# Patient Record
Sex: MALE | Race: WHITE | NOT HISPANIC OR LATINO | Employment: OTHER | URBAN - METROPOLITAN AREA
[De-identification: names, ages, dates, MRNs, and addresses within clinical notes are randomized per-mention and may not be internally consistent; named-entity substitution may affect disease eponyms.]

---

## 2017-04-13 ENCOUNTER — HOSPITAL ENCOUNTER (OUTPATIENT)
Dept: ORTHOPEDIC SURGERY | Facility: CLINIC | Age: 77
Discharge: HOME | End: 2017-04-13
Attending: PODIATRIST | Admitting: PODIATRIST

## 2017-05-18 ENCOUNTER — HOSPITAL ENCOUNTER (OUTPATIENT)
Dept: MRI IMAGING | Facility: HOSPITAL | Age: 77
Discharge: HOME | End: 2017-05-18
Attending: PODIATRIST | Admitting: PODIATRIST

## 2017-11-15 ENCOUNTER — HOSPITAL ENCOUNTER (OUTPATIENT)
Dept: ULTRASOUND IMAGING | Facility: HOSPITAL | Age: 77
Discharge: HOME | End: 2017-11-15
Attending: FAMILY MEDICINE | Admitting: FAMILY MEDICINE

## 2017-11-24 ENCOUNTER — HOSPITAL ENCOUNTER (OUTPATIENT)
Dept: CT IMAGING | Facility: HOSPITAL | Age: 77
Discharge: HOME | End: 2017-11-24
Attending: UROLOGY | Admitting: UROLOGY

## 2017-11-24 LAB — CREAT BLDA-MCNC: 0.8 MG/DL (ref 0.6–1.3)

## 2018-10-19 ENCOUNTER — HOSPITAL ENCOUNTER (OUTPATIENT)
Dept: MRI IMAGING | Facility: HOSPITAL | Age: 78
Discharge: HOME | End: 2018-10-19
Attending: NEUROLOGICAL SURGERY | Admitting: NEUROLOGICAL SURGERY

## 2019-07-19 ENCOUNTER — TELEPHONE (OUTPATIENT)
Dept: FAMILY MEDICINE CLINIC | Facility: CLINIC | Age: 79
End: 2019-07-19

## 2019-07-19 DIAGNOSIS — I10 ESSENTIAL HYPERTENSION: Primary | ICD-10-CM

## 2019-07-19 NOTE — TELEPHONE ENCOUNTER
Patient came into office wanting to have labs drawn but no orders are in chart. Will have labs drawn prior to seeing you. Please advise.

## 2019-07-19 NOTE — TELEPHONE ENCOUNTER
Patient's spouse notified that he has labs ordered in his chart for fasting and does not need an appt for lab work. She was advised that he will need an appt with Dr. Guzmán to go over those or for any med refills. She voiced understanding.

## 2019-07-23 LAB
ALBUMIN SERPL-MCNC: 4.3 G/DL (ref 3.5–4.8)
ALBUMIN/GLOB SERPL: 1.7 {RATIO} (ref 1.2–2.2)
ALP SERPL-CCNC: 83 IU/L (ref 39–117)
ALT SERPL-CCNC: 11 IU/L (ref 0–44)
AMBIG ABBREV CMP14 DEFAULT: NORMAL
AMBIG ABBREV LP DEFAULT: NORMAL
AST SERPL-CCNC: 15 IU/L (ref 0–40)
BILIRUB SERPL-MCNC: 0.7 MG/DL (ref 0–1.2)
BUN SERPL-MCNC: 12 MG/DL (ref 8–27)
BUN/CREAT SERPL: 16 (ref 10–24)
CALCIUM SERPL-MCNC: 9.6 MG/DL (ref 8.6–10.2)
CHLORIDE SERPL-SCNC: 104 MMOL/L (ref 96–106)
CHOLEST SERPL-MCNC: 144 MG/DL (ref 100–199)
CO2 SERPL-SCNC: 22 MMOL/L (ref 20–29)
CREAT SERPL-MCNC: 0.76 MG/DL (ref 0.76–1.27)
GLOBULIN SER CALC-MCNC: 2.5 G/DL (ref 1.5–4.5)
GLUCOSE SERPL-MCNC: 98 MG/DL (ref 65–99)
HDLC SERPL-MCNC: 45 MG/DL
LDLC SERPL CALC-MCNC: 80 MG/DL (ref 0–99)
POTASSIUM SERPL-SCNC: 4.5 MMOL/L (ref 3.5–5.2)
PROT SERPL-MCNC: 6.8 G/DL (ref 6–8.5)
SODIUM SERPL-SCNC: 141 MMOL/L (ref 134–144)
TRIGL SERPL-MCNC: 94 MG/DL (ref 0–149)
VLDLC SERPL CALC-MCNC: 19 MG/DL (ref 5–40)

## 2019-07-26 ENCOUNTER — TELEPHONE (OUTPATIENT)
Dept: FAMILY MEDICINE CLINIC | Facility: CLINIC | Age: 79
End: 2019-07-26

## 2019-07-26 PROBLEM — Z00.00 ENCOUNTER FOR GENERAL ADULT MEDICAL EXAMINATION WITHOUT ABNORMAL FINDINGS: Status: ACTIVE | Noted: 2017-02-14

## 2019-07-26 PROBLEM — M76.70 PERONEAL TENDINITIS: Status: ACTIVE | Noted: 2017-04-13

## 2019-07-26 PROBLEM — Z95.9 PRESENCE OF CARDIAC AND VASCULAR IMPLANT AND GRAFT: Status: ACTIVE | Noted: 2017-11-01

## 2019-07-26 PROBLEM — M79.672 FOOT PAIN, LEFT: Status: ACTIVE | Noted: 2017-04-13

## 2019-07-26 PROBLEM — J01.90 ACUTE SINUSITIS: Status: ACTIVE | Noted: 2017-06-20

## 2019-07-26 PROBLEM — J20.9 ACUTE BRONCHITIS: Status: ACTIVE | Noted: 2018-09-05

## 2019-07-26 PROBLEM — Z23 ENCOUNTER FOR IMMUNIZATION: Status: ACTIVE | Noted: 2018-01-08

## 2019-07-26 PROBLEM — J06.9 ACUTE UPPER RESPIRATORY INFECTION: Status: ACTIVE | Noted: 2017-11-01

## 2019-07-26 PROBLEM — Z12.5 ENCOUNTER FOR SCREENING FOR MALIGNANT NEOPLASM OF PROSTATE: Status: ACTIVE | Noted: 2017-05-15

## 2019-07-26 PROBLEM — I71.40 ABDOMINAL AORTIC ANEURYSM (HCC): Status: ACTIVE | Noted: 2017-11-01

## 2019-07-26 PROBLEM — R31.9 HEMATURIA: Status: ACTIVE | Noted: 2017-11-01

## 2019-07-26 RX ORDER — FINASTERIDE 5 MG/1
5 TABLET, FILM COATED ORAL DAILY
COMMUNITY
Start: 2019-06-05

## 2019-07-26 RX ORDER — TRAMADOL HYDROCHLORIDE 50 MG/1
50 TABLET ORAL EVERY 6 HOURS PRN
Refills: 0 | COMMUNITY
Start: 2019-05-21 | End: 2019-11-13

## 2019-07-26 RX ORDER — TAMSULOSIN HYDROCHLORIDE 0.4 MG/1
1 CAPSULE ORAL DAILY
COMMUNITY
Start: 2019-04-22 | End: 2019-10-28 | Stop reason: SDUPTHER

## 2019-07-26 RX ORDER — PRAVASTATIN SODIUM 80 MG/1
TABLET ORAL
COMMUNITY
Start: 2019-05-28 | End: 2019-08-26 | Stop reason: SDUPTHER

## 2019-07-26 RX ORDER — ASPIRIN 81 MG/1
81 TABLET ORAL DAILY
COMMUNITY
Start: 2012-02-06

## 2019-07-26 NOTE — TELEPHONE ENCOUNTER
Patient came in and said he is needing a refill on his Tamsulosin 0.4 MG- please send to [facility]

## 2019-07-29 RX ORDER — TAMSULOSIN HYDROCHLORIDE 0.4 MG/1
1 CAPSULE ORAL NIGHTLY
COMMUNITY
End: 2019-07-29 | Stop reason: SDUPTHER

## 2019-07-29 RX ORDER — TAMSULOSIN HYDROCHLORIDE 0.4 MG/1
1 CAPSULE ORAL NIGHTLY
Qty: 90 CAPSULE | Refills: 0 | Status: SHIPPED | OUTPATIENT
Start: 2019-07-29 | End: 2019-07-30 | Stop reason: SDUPTHER

## 2019-07-30 ENCOUNTER — OFFICE VISIT (OUTPATIENT)
Dept: CARDIOLOGY | Facility: CLINIC | Age: 79
End: 2019-07-30

## 2019-07-30 VITALS
SYSTOLIC BLOOD PRESSURE: 103 MMHG | HEART RATE: 73 BPM | BODY MASS INDEX: 24.39 KG/M2 | OXYGEN SATURATION: 100 % | DIASTOLIC BLOOD PRESSURE: 64 MMHG | WEIGHT: 170 LBS

## 2019-07-30 DIAGNOSIS — I25.118 CORONARY ARTERY DISEASE OF NATIVE ARTERY OF NATIVE HEART WITH STABLE ANGINA PECTORIS (HCC): Primary | ICD-10-CM

## 2019-07-30 DIAGNOSIS — I10 ESSENTIAL HYPERTENSION: ICD-10-CM

## 2019-07-30 DIAGNOSIS — E78.00 PURE HYPERCHOLESTEROLEMIA: ICD-10-CM

## 2019-07-30 PROCEDURE — 99213 OFFICE O/P EST LOW 20 MIN: CPT | Performed by: INTERNAL MEDICINE

## 2019-07-30 NOTE — PROGRESS NOTES
Subjective:     Encounter Date:07/30/2019      Patient ID: Yordy Hebert is a 78 y.o. male.    Chief Complaint:  History of Present Illness 78-year-old white male with a history of coronary artery disease history of abdominal aortic aneurysm and peripheral vascular disease hypertension hyperlipidemia presents to my office for follow-up.  Pain is currently stable without any symptoms of chest pain or shortness of breath at rest on exertion.  No complaints of any PND orthopnea.  No palpitations dizziness syncope or swelling of the feet.  Patient has been taking all the medicines regularly.  Patient does not smoke.    The following portions of the patient's history were reviewed and updated as appropriate: allergies, current medications, past family history, past medical history, past social history, past surgical history and problem list.  History reviewed. No pertinent past medical history.  History reviewed. No pertinent surgical history.  /64 (BP Location: Left arm, Patient Position: Sitting)   Pulse 73   Wt 77.1 kg (170 lb)   SpO2 100%   BMI 24.39 kg/m²   History reviewed. No pertinent family history.    Current Outpatient Medications:   •  aspirin (ASPIR-LOW) 81 MG EC tablet, ASPIR-LOW 81 MG TBEC, Disp: , Rfl:   •  finasteride (PROSCAR) 5 MG tablet, , Disp: , Rfl:   •  metoprolol tartrate (LOPRESSOR) 25 MG tablet, TAKE 1 2 (ONE HALF) TABLET BY MOUTH TWICE DAILY, Disp: , Rfl: 4  •  pravastatin (PRAVACHOL) 80 MG tablet, , Disp: , Rfl:   •  tamsulosin (FLOMAX) 0.4 MG capsule 24 hr capsule, Take 1 capsule by mouth Daily., Disp: , Rfl:   •  traMADol (ULTRAM) 50 MG tablet, Take 50 mg by mouth Every 6 (Six) Hours As Needed., Disp: , Rfl: 0  No Known Allergies  Social History     Socioeconomic History   • Marital status:      Spouse name: Not on file   • Number of children: Not on file   • Years of education: Not on file   • Highest education level: Not on file   Tobacco Use   • Smoking status:  Former Smoker   Substance and Sexual Activity   • Alcohol use: No     Frequency: Never     Review of Systems   Constitution: Negative for fever and malaise/fatigue.   Cardiovascular: Negative for chest pain, dyspnea on exertion and palpitations.   Respiratory: Negative for cough and shortness of breath.    Skin: Negative for rash.   Gastrointestinal: Negative for abdominal pain, nausea and vomiting.   Neurological: Negative for focal weakness and headaches.   All other systems reviewed and are negative.             Objective:     Physical Exam   Constitutional: He appears well-developed and well-nourished.   HENT:   Head: Normocephalic and atraumatic.   Eyes: Conjunctivae are normal. No scleral icterus.   Neck: Normal range of motion. Neck supple. No JVD present. Carotid bruit is not present.   Cardiovascular: Normal rate, regular rhythm, S1 normal, S2 normal, normal heart sounds and intact distal pulses. PMI is not displaced.   Pulmonary/Chest: Effort normal and breath sounds normal. He has no wheezes. He has no rales.   Abdominal: Soft. Bowel sounds are normal.   Neurological: He is alert. He has normal strength.   Skin: Skin is warm and dry. No rash noted.     Procedures    Lab Review:       Assessment:          Diagnosis Plan   1. Coronary artery disease of native artery of native heart with stable angina pectoris (CMS/HCC)     2. Essential hypertension     3. Pure hypercholesterolemia            Plan:       Patient has nonobstructive coronary disease and is currently stable on medications.  Blood pressure and heart rate are stable per  Lipid levels are followed by the primary care doctor.  Continue current medications and follow-up in 6 months.

## 2019-07-31 ENCOUNTER — RESULTS ENCOUNTER (OUTPATIENT)
Dept: FAMILY MEDICINE CLINIC | Facility: CLINIC | Age: 79
End: 2019-07-31

## 2019-07-31 DIAGNOSIS — I10 ESSENTIAL HYPERTENSION: ICD-10-CM

## 2019-08-15 ENCOUNTER — OFFICE VISIT (OUTPATIENT)
Dept: FAMILY MEDICINE CLINIC | Facility: CLINIC | Age: 79
End: 2019-08-15

## 2019-08-15 ENCOUNTER — TRANSCRIBE ORDERS (OUTPATIENT)
Dept: ADMINISTRATIVE | Facility: HOSPITAL | Age: 79
End: 2019-08-15

## 2019-08-15 VITALS
TEMPERATURE: 98.2 F | WEIGHT: 163 LBS | DIASTOLIC BLOOD PRESSURE: 66 MMHG | BODY MASS INDEX: 24.14 KG/M2 | HEIGHT: 69 IN | SYSTOLIC BLOOD PRESSURE: 119 MMHG | OXYGEN SATURATION: 98 % | RESPIRATION RATE: 18 BRPM | HEART RATE: 56 BPM

## 2019-08-15 DIAGNOSIS — I10 ESSENTIAL HYPERTENSION: Primary | ICD-10-CM

## 2019-08-15 DIAGNOSIS — I71.40 ABDOMINAL AORTIC ANEURYSM (AAA) WITHOUT RUPTURE (HCC): ICD-10-CM

## 2019-08-15 DIAGNOSIS — N13.8 BENIGN PROSTATIC HYPERPLASIA WITH URINARY OBSTRUCTION: ICD-10-CM

## 2019-08-15 DIAGNOSIS — Z72.0 CONTINUOUS TOBACCO ABUSE: ICD-10-CM

## 2019-08-15 DIAGNOSIS — Z12.5 SCREENING FOR PROSTATE CANCER: ICD-10-CM

## 2019-08-15 DIAGNOSIS — N40.1 BENIGN PROSTATIC HYPERPLASIA WITH URINARY OBSTRUCTION: ICD-10-CM

## 2019-08-15 PROCEDURE — 99213 OFFICE O/P EST LOW 20 MIN: CPT | Performed by: FAMILY MEDICINE

## 2019-08-15 NOTE — PROGRESS NOTES
Subjective   Yordy Hebert is a 78 y.o. male.     Chief Complaint   Patient presents with   • Benign Prostatic Hypertrophy   • Hyperlipidemia   • Post-op Aneurysm Repair         Current Outpatient Medications:   •  aspirin (ASPIR-LOW) 81 MG EC tablet, ASPIR-LOW 81 MG TBEC, Disp: , Rfl:   •  finasteride (PROSCAR) 5 MG tablet, , Disp: , Rfl:   •  metoprolol tartrate (LOPRESSOR) 25 MG tablet, TAKE 1 2 (ONE HALF) TABLET BY MOUTH TWICE DAILY, Disp: , Rfl: 4  •  pravastatin (PRAVACHOL) 80 MG tablet, , Disp: , Rfl:   •  tamsulosin (FLOMAX) 0.4 MG capsule 24 hr capsule, Take 1 capsule by mouth Daily., Disp: , Rfl:   •  traMADol (ULTRAM) 50 MG tablet, Take 50 mg by mouth Every 6 (Six) Hours As Needed., Disp: , Rfl: 0  •  varenicline (CHANTIX STARTING MONTH ) 0.5 MG X 11 & 1 MG X 42 tablet, Take 0.5 mg one daily on days 1-3 and and 0.5 mg twice daily on days 4-7.Then 1 mg twice daily for a total of 12 weeks., Disp: 53 tablet, Rfl: 0    Past Medical History:   Diagnosis Date   • Coronary artery disease    • DJD (degenerative joint disease)     (L) hip   • History of AAA (abdominal aortic aneurysm) repair    • Hyperlipidemia    • Hypertension    • Inguinal hernia     right   • Prostatitis        Past Surgical History:   Procedure Laterality Date   • ABDOMINAL AORTIC ANEURYSM REPAIR     • BACK SURGERY  2016   • CARDIAC CATHETERIZATION     • CATARACT EXTRACTION, BILATERAL     • CORONARY ANGIOPLASTY WITH STENT PLACEMENT     • INGUINAL HERNIA REPAIR     • RENAL ARTERY STENT Left 2008       Family History   Family history unknown: Yes       Social History     Socioeconomic History   • Marital status:      Spouse name: Not on file   • Number of children: Not on file   • Years of education: Not on file   • Highest education level: Not on file   Tobacco Use   • Smoking status: Former Smoker     Packs/day: 1.00     Years: 50.00     Pack years: 50.00     Last attempt to quit:      Years since quittin.6   •  Smokeless tobacco: Never Used   Substance and Sexual Activity   • Alcohol use: No     Frequency: Never       79 y/o C male Pt here for f/u on HTN/ BPH/ AAA    PT had AAA sleeve repr in 2008 and told to get f/u CT done since never really f/u'd in years         The following portions of the patient's history were reviewed and updated as appropriate: allergies, current medications, past family history, past medical history, past social history, past surgical history and problem list.    Review of Systems   Respiratory: Positive for shortness of breath. Negative for chest tightness.    Cardiovascular: Negative for chest pain, palpitations and leg swelling.   Genitourinary: Positive for frequency and nocturia.       Vitals:    08/15/19 1108   BP: 119/66   Pulse: 56   Resp: 18   Temp: 98.2 °F (36.8 °C)   SpO2: 98%       Objective   Physical Exam   Constitutional: He is oriented to person, place, and time. He appears well-developed and well-nourished.   HENT:   Head: Normocephalic and atraumatic.   Neck: Normal range of motion. Neck supple.   Cardiovascular: Normal rate, regular rhythm, normal heart sounds and intact distal pulses.   No murmur heard.  Pulmonary/Chest: Effort normal. No respiratory distress. He has decreased breath sounds in the right upper field, the right middle field, the right lower field, the left upper field, the left middle field and the left lower field. He has no wheezes. He has no rhonchi. He has no rales.   Musculoskeletal: He exhibits no edema.   Neurological: He is alert and oriented to person, place, and time.   Skin: Skin is warm and dry. No rash noted.   Psychiatric: He has a normal mood and affect. His behavior is normal. Judgment and thought content normal.   Nursing note and vitals reviewed.        Assessment/Plan   Yordy was seen today for benign prostatic hypertrophy, hyperlipidemia and post-op aneurysm repair.    Diagnoses and all orders for this visit:    Essential  hypertension    Abdominal aortic aneurysm (AAA) without rupture (CMS/HCC)  -     Cancel: CT Angiogram Aorta; Future  -     CT Angio Abdominal Aorta Bilateral Iliofem Runoff With & Without Contrast; Future    Benign prostatic hyperplasia with urinary obstruction    Continuous tobacco abuse    Screening for prostate cancer  -     PSA Screen    Other orders  -     Discontinue: varenicline (CHANTIX STARTING MONTH PAK) 0.5 MG X 11 & 1 MG X 42 tablet; Take 0.5 mg one daily on days 1-3 and and 0.5 mg twice daily on days 4-7.Then 1 mg twice daily for a total of 12 weeks.  -     varenicline (CHANTIX STARTING MONTH PAK) 0.5 MG X 11 & 1 MG X 42 tablet; Take 0.5 mg one daily on days 1-3 and and 0.5 mg twice daily on days 4-7.Then 1 mg twice daily for a total of 12 weeks.

## 2019-08-16 LAB — PSA SERPL-MCNC: 0.6 NG/ML (ref 0–4)

## 2019-08-26 RX ORDER — PRAVASTATIN SODIUM 80 MG/1
80 TABLET ORAL NIGHTLY
Qty: 90 TABLET | Refills: 2 | Status: SHIPPED | OUTPATIENT
Start: 2019-08-26 | End: 2020-06-03 | Stop reason: SDUPTHER

## 2019-09-03 ENCOUNTER — HOSPITAL ENCOUNTER (OUTPATIENT)
Dept: CT IMAGING | Facility: HOSPITAL | Age: 79
Discharge: HOME | End: 2019-09-03
Admitting: FAMILY MEDICINE

## 2019-09-03 DIAGNOSIS — I71.40 ABDOMINAL AORTIC ANEURYSM (AAA) WITHOUT RUPTURE (HCC): ICD-10-CM

## 2019-09-03 LAB — CREAT BLDA-MCNC: 1 MG/DL (ref 0.6–1.3)

## 2019-09-03 PROCEDURE — 82565 ASSAY OF CREATININE: CPT

## 2019-09-03 PROCEDURE — 0 IOPAMIDOL PER 1 ML: Performed by: FAMILY MEDICINE

## 2019-09-03 PROCEDURE — 75635 CT ANGIO ABDOMINAL ARTERIES: CPT

## 2019-09-03 RX ADMIN — IOPAMIDOL 100 ML: 755 INJECTION, SOLUTION INTRAVENOUS at 11:30

## 2019-09-04 ENCOUNTER — TELEPHONE (OUTPATIENT)
Dept: FAMILY MEDICINE CLINIC | Facility: CLINIC | Age: 79
End: 2019-09-04

## 2019-10-23 ENCOUNTER — FLU SHOT (OUTPATIENT)
Dept: FAMILY MEDICINE CLINIC | Facility: CLINIC | Age: 79
End: 2019-10-23

## 2019-10-23 DIAGNOSIS — Z23 IMMUNIZATION, PNEUMOCOCCUS AND INFLUENZA: ICD-10-CM

## 2019-10-23 PROCEDURE — 90653 IIV ADJUVANT VACCINE IM: CPT | Performed by: NURSE PRACTITIONER

## 2019-10-23 PROCEDURE — G0008 ADMIN INFLUENZA VIRUS VAC: HCPCS | Performed by: NURSE PRACTITIONER

## 2019-10-28 RX ORDER — TAMSULOSIN HYDROCHLORIDE 0.4 MG/1
1 CAPSULE ORAL DAILY
Qty: 90 CAPSULE | Refills: 0 | Status: SHIPPED | OUTPATIENT
Start: 2019-10-28 | End: 2020-01-27 | Stop reason: SDUPTHER

## 2019-10-28 NOTE — TELEPHONE ENCOUNTER
90 day supply of requested medication is loaded and ready to be sent upon approval since he has not seen you yet. It's #90 due to mail order.  Thanks.

## 2019-10-28 NOTE — TELEPHONE ENCOUNTER
Patient came in said he was  previous patient he has an upcoming appointment with  on 11/13/2019- he is needing a refill on his Tamsulosin 0.4 mg- please send this to [facility]

## 2019-11-13 ENCOUNTER — OFFICE VISIT (OUTPATIENT)
Dept: FAMILY MEDICINE CLINIC | Facility: CLINIC | Age: 79
End: 2019-11-13

## 2019-11-13 VITALS
TEMPERATURE: 97.9 F | HEART RATE: 60 BPM | OXYGEN SATURATION: 97 % | RESPIRATION RATE: 16 BRPM | DIASTOLIC BLOOD PRESSURE: 76 MMHG | HEIGHT: 69 IN | SYSTOLIC BLOOD PRESSURE: 133 MMHG | WEIGHT: 161 LBS | BODY MASS INDEX: 23.85 KG/M2

## 2019-11-13 DIAGNOSIS — I71.40 ABDOMINAL AORTIC ANEURYSM (AAA) WITHOUT RUPTURE (HCC): Primary | ICD-10-CM

## 2019-11-13 DIAGNOSIS — E74.39 GLUCOSE INTOLERANCE: ICD-10-CM

## 2019-11-13 DIAGNOSIS — M54.16 LUMBAR RADICULOPATHY: ICD-10-CM

## 2019-11-13 DIAGNOSIS — I25.10 CHRONIC CORONARY ARTERY DISEASE: ICD-10-CM

## 2019-11-13 DIAGNOSIS — I10 ESSENTIAL HYPERTENSION: ICD-10-CM

## 2019-11-13 DIAGNOSIS — Z98.890 S/P AAA REPAIR: ICD-10-CM

## 2019-11-13 DIAGNOSIS — Z86.79 S/P AAA REPAIR: ICD-10-CM

## 2019-11-13 PROCEDURE — 99214 OFFICE O/P EST MOD 30 MIN: CPT | Performed by: FAMILY MEDICINE

## 2019-11-13 NOTE — PROGRESS NOTES
Subjective   Yordy Hebert is a 78 y.o. male.   Chief Complaint   Patient presents with   • Hypertension     Does not check blood pressure at home. Sees Dr. Perales at [facility] for enlarged prostate.   • Hyperlipidemia     Continues on Pravastatin without side effects.   • Post-op Aneurysm Repair     Would like to know if he needs a repeat CT AAA - past hx of having AAA        History of Present Illness   Presents to the office as a new patient to me.  Prior PCP was Dr. Guzmán.  Major question for me is whether he needs a repeat CT of AAA.   Had AAA repaired in '08 with a sleeve - repeat CT in 9/19 - no evidence of any leaking.      HTN - No obvious SE of meds.  Doesn't check B/P at home.  NO h/a.    HLD - stable on pravastatin.  Last labs in July - cholesterol wqs good, glucose, renal function were good. PSA in August was 0.6.    He tells me about his maladays, surgeries.  Reviewed and updated active problem list as below.    Has chronic back pain that runs down his legs.  Some h/o impaired glucose or IFG in chart.  Glucose on recent labs was normal.  May need an a1c at some point.    Does not need any refills today.      Patient Active Problem List    Diagnosis Date Noted   • Encounter for immunization 01/08/2018   • Abdominal aortic aneurysm (CMS/HCC) 11/01/2017   • Hematuria 11/01/2017   • Presence of cardiac and vascular implant and graft 11/01/2017   • Encounter for screening for malignant neoplasm of prostate 05/15/2017   • Peroneal tendinitis 04/13/2017   • Encounter for general adult medical examination without abnormal findings 02/14/2017   • Lumbar radiculopathy 06/02/2015     Note Last Updated: 11/13/2019     Had series of injections in his back in 2016.  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   • Impaired fasting glucose 03/03/2015   • Need for immunization against influenza 10/20/2014   • Hypertension 09/30/2014   • Chronic  coronary artery disease 2014     Note Last Updated: 2019     Had MI on 2014 - [facility] emergent cath - had 2 stents     • S/P AAA repair 2014   • Encounter for screening for malignant neoplasm of prostate 2014   • Carotid bruit 2013   • Hay fever 2013   • Inguinal hernia, right 2012   • Arteriosclerotic vascular disease 2012   • Benign prostatic hyperplasia with urinary obstruction 2012   • Degenerative joint disease 2012   • Family history of abdominal aortic aneurysm (AAA) repair 2012   • Glucose intolerance 2012   • Inflammatory disease of prostate 2012   • Systolic murmur 2012   • Hyperlipidemia 2011           Past Surgical History:   Procedure Laterality Date   • ABDOMINAL AORTIC ANEURYSM REPAIR     • BACK SURGERY  2016   • CARDIAC CATHETERIZATION     • CATARACT EXTRACTION, BILATERAL     • CORONARY ANGIOPLASTY WITH STENT PLACEMENT     • INGUINAL HERNIA REPAIR     • RENAL ARTERY STENT Left 2008     Current Outpatient Medications on File Prior to Visit   Medication Sig   • aspirin (ASPIR-LOW) 81 MG EC tablet ASPIR-LOW 81 MG TBEC   • finasteride (PROSCAR) 5 MG tablet    • metoprolol tartrate (LOPRESSOR) 25 MG tablet TAKE 1 2 (ONE HALF) TABLET BY MOUTH TWICE DAILY   • pravastatin (PRAVACHOL) 80 MG tablet Take 1 tablet by mouth Every Night.   • tamsulosin (FLOMAX) 0.4 MG capsule 24 hr capsule Take 1 capsule by mouth Daily.     No current facility-administered medications on file prior to visit.      No Known Allergies  Social History     Socioeconomic History   • Marital status:      Spouse name: Not on file   • Number of children: Not on file   • Years of education: Not on file   • Highest education level: Not on file   Tobacco Use   • Smoking status: Former Smoker     Packs/day: 1.00     Years: 50.00     Pack years: 50.00     Last attempt to quit:      Years since quittin.8   • Smokeless tobacco: Never  "Used   Substance and Sexual Activity   • Alcohol use: No     Frequency: Never     Family History   Family history unknown: Yes     The following portions of the patient's history were reviewed and updated as appropriate: allergies, current medications, past family history, past medical history, past social history, past surgical history and problem list.    Review of Systems   Constitutional: Negative for chills and fever.   Respiratory: Negative for cough and shortness of breath.    Cardiovascular: Negative for chest pain and leg swelling.   Gastrointestinal: Negative for abdominal pain, diarrhea, nausea and vomiting.   Musculoskeletal: Positive for back pain. Negative for gait problem.   Neurological: Negative for dizziness and headache.       Objective   /76 (BP Location: Left arm, Patient Position: Sitting, Cuff Size: Adult)   Pulse 60   Temp 97.9 °F (36.6 °C) (Oral)   Resp 16   Ht 175.3 cm (69\")   Wt 73 kg (161 lb)   SpO2 97%   BMI 23.78 kg/m²   Physical Exam   Constitutional: He is oriented to person, place, and time. He appears well-developed and well-nourished.   HENT:   Head: Normocephalic and atraumatic.   Mouth/Throat: Oropharynx is clear and moist.   Eyes: Conjunctivae and EOM are normal. Pupils are equal, round, and reactive to light.   Neck: Neck supple. No JVD present. No thyromegaly present.   Cardiovascular: Normal rate, regular rhythm and intact distal pulses.   Murmur (left lower sternal border) heard.   Systolic murmur is present with a grade of 2/6.  Pulmonary/Chest: Effort normal and breath sounds normal. No respiratory distress. He has no wheezes. He has no rales. He exhibits no tenderness.   Abdominal: Soft. He exhibits no distension and no mass. There is no tenderness. There is no rebound and no guarding.   Musculoskeletal: Normal range of motion. He exhibits no edema.   Lymphadenopathy:     He has no cervical adenopathy.   Neurological: He is alert and oriented to person, " place, and time.   Skin: Skin is warm. No rash noted.   Psychiatric: He has a normal mood and affect. His behavior is normal.         Assessment/Plan   Diagnoses and all orders for this visit:    1. Abdominal aortic aneurysm (AAA) without rupture (CMS/HCC) (Primary)    2. Chronic coronary artery disease    3. Essential hypertension    4. Lumbar radiculopathy    5. S/P AAA repair    6. Glucose intolerance    overall, all conditions are stable.  Contnue all current meds, treatments and specialist follow up as planned.  Call with any problems or concerns before next visit           Return in about 4 months (around 3/13/2020).

## 2019-11-17 PROBLEM — M79.672 FOOT PAIN, LEFT: Status: RESOLVED | Noted: 2017-04-13 | Resolved: 2019-11-17

## 2020-01-08 ENCOUNTER — OFFICE VISIT (OUTPATIENT)
Dept: FAMILY MEDICINE CLINIC | Facility: CLINIC | Age: 80
End: 2020-01-08

## 2020-01-08 VITALS
DIASTOLIC BLOOD PRESSURE: 83 MMHG | HEIGHT: 69 IN | TEMPERATURE: 98.2 F | BODY MASS INDEX: 24.29 KG/M2 | OXYGEN SATURATION: 97 % | HEART RATE: 60 BPM | RESPIRATION RATE: 18 BRPM | SYSTOLIC BLOOD PRESSURE: 137 MMHG | WEIGHT: 164 LBS

## 2020-01-08 DIAGNOSIS — M25.512 CHRONIC LEFT SHOULDER PAIN: Primary | ICD-10-CM

## 2020-01-08 DIAGNOSIS — M12.812 LEFT ROTATOR CUFF TEAR ARTHROPATHY: ICD-10-CM

## 2020-01-08 DIAGNOSIS — M75.102 LEFT ROTATOR CUFF TEAR ARTHROPATHY: ICD-10-CM

## 2020-01-08 DIAGNOSIS — G89.29 CHRONIC LEFT SHOULDER PAIN: Primary | ICD-10-CM

## 2020-01-08 PROBLEM — R31.9 HEMATURIA: Status: RESOLVED | Noted: 2017-11-01 | Resolved: 2020-01-08

## 2020-01-08 PROBLEM — Z12.5 ENCOUNTER FOR SCREENING FOR MALIGNANT NEOPLASM OF PROSTATE: Status: RESOLVED | Noted: 2017-05-15 | Resolved: 2020-01-08

## 2020-01-08 PROBLEM — Z23 ENCOUNTER FOR IMMUNIZATION: Status: RESOLVED | Noted: 2018-01-08 | Resolved: 2020-01-08

## 2020-01-08 PROBLEM — Z00.00 ENCOUNTER FOR GENERAL ADULT MEDICAL EXAMINATION WITHOUT ABNORMAL FINDINGS: Status: RESOLVED | Noted: 2017-02-14 | Resolved: 2020-01-08

## 2020-01-08 PROCEDURE — 99213 OFFICE O/P EST LOW 20 MIN: CPT | Performed by: FAMILY MEDICINE

## 2020-01-08 NOTE — PROGRESS NOTES
Subjective   Yordy Hebert is a 79 y.o. male.   Chief Complaint   Patient presents with   • Shoulder Pain     (L) x 2 months; worsened in the past 2 weeks; sore & popping.        History of Present Illness   79-year-old white male presents today with a complaint of chronic left shoulder pain.  Is gotten worse in the past couple of weeks.  He says it started hurting overall about 2 months ago.  No known injuries.  For the past few weeks when he puts his shoulder through range of motion it will pop and becomes very painful when he raises his arm away from his body.  He manages this by keeping his arm close to his body.  He does not sleep on his left shoulder.  Fingers of the left hand are not numb and tingling.      Patient Active Problem List    Diagnosis Date Noted   • Left shoulder pain 01/08/2020   • Left rotator cuff tear arthropathy 01/08/2020   • Abdominal aortic aneurysm (CMS/[facility]) 11/01/2017   • Presence of cardiac and vascular implant and graft 11/01/2017   • Peroneal tendinitis 04/13/2017   • Lumbar radiculopathy 06/02/2015     Note Last Updated: 11/13/2019     Had series of injections in his back in 2016.  Had surgery to treat sciatica - resolved sx in 2 weeks     • Aneurysm of iliac artery (CMS/[facility]) 03/03/2015   • Impaired fasting glucose 03/03/2015   • Hypertension 09/30/2014   • Chronic coronary artery disease 09/23/2014     Note Last Updated: 11/13/2019     Had MI on 2014 - hed emergent cath - had 2 stents     • S/P AAA repair 08/24/2014   • Carotid bruit 09/24/2013   • Hay fever 03/22/2013   • Inguinal hernia, right 04/27/2012   • Arteriosclerotic vascular disease 01/31/2012   • Benign prostatic hyperplasia with urinary obstruction 01/31/2012   • Degenerative joint disease 01/31/2012   • Inflammatory disease of prostate 01/31/2012   • Systolic murmur 01/31/2012   • Hyperlipidemia 12/20/2011           Past Surgical History:   Procedure Laterality Date   • ABDOMINAL AORTIC ANEURYSM REPAIR     • BACK  "SURGERY  2016   • CARDIAC CATHETERIZATION     • CATARACT EXTRACTION, BILATERAL     • CORONARY ANGIOPLASTY WITH STENT PLACEMENT     • INGUINAL HERNIA REPAIR     • RENAL ARTERY STENT Left 2008     Current Outpatient Medications on File Prior to Visit   Medication Sig   • aspirin (ASPIR-LOW) 81 MG EC tablet ASPIR-LOW 81 MG TBEC   • finasteride (PROSCAR) 5 MG tablet    • metoprolol tartrate (LOPRESSOR) 25 MG tablet TAKE 1 2 (ONE HALF) TABLET BY MOUTH TWICE DAILY   • pravastatin (PRAVACHOL) 80 MG tablet Take 1 tablet by mouth Every Night.     No current facility-administered medications on file prior to visit.      No Known Allergies  Social History     Socioeconomic History   • Marital status:      Spouse name: Not on file   • Number of children: Not on file   • Years of education: Not on file   • Highest education level: Not on file   Tobacco Use   • Smoking status: Former Smoker     Packs/day: 1.00     Years: 50.00     Pack years: 50.00     Last attempt to quit: 2012     Years since quittin.0   • Smokeless tobacco: Never Used   Substance and Sexual Activity   • Alcohol use: No     Frequency: Never     Family History   Family history unknown: Yes     The following portions of the patient's history were reviewed and updated as appropriate: allergies, current medications, past family history, past medical history, past social history, past surgical history and problem list.    Review of Systems   Constitutional: Negative for chills and fever.   Respiratory: Negative for cough and shortness of breath.    Cardiovascular: Negative for chest pain.   Gastrointestinal: Negative for abdominal pain and nausea.       Objective   /83 (BP Location: Right arm, Patient Position: Sitting, Cuff Size: Adult)   Pulse 60   Temp 98.2 °F (36.8 °C) (Oral)   Resp 18   Ht 175.3 cm (69\")   Wt 74.4 kg (164 lb)   SpO2 97%   BMI 24.22 kg/m²   Physical Exam   Constitutional: He is oriented to person, place, and " time. He appears well-developed and well-nourished.   HENT:   Head: Normocephalic and atraumatic.   Eyes: Conjunctivae and EOM are normal.   Neck: Normal range of motion.   Cardiovascular: Normal rate, regular rhythm and normal heart sounds.   Pulmonary/Chest: Effort normal and breath sounds normal. No respiratory distress.   Musculoskeletal: Normal range of motion.   Slight swelling of the left shoulder.  Pain with abducting left arm above level.  After that he begins swinging.  Positive apprehension test.  Positive impingement test.  Unable to do liftoff test.   Neurological: He is alert and oriented to person, place, and time.   Skin: Skin is warm and dry. No rash noted.   Psychiatric: He has a normal mood and affect. His behavior is normal.     Assessment/Plan   Diagnoses and all orders for this visit:    1. Chronic left shoulder pain (Primary)  -     XR Shoulder 2+ View Left (In Office)    2. Left rotator cuff tear arthropathy  -     XR Shoulder 2+ View Left (In Office)    Clinically suspect disruption of the left rotator cuff.  Obtain an x-ray in the office today just to assess bony anatomy.  We will follow-up with him by phone with those results.  Until then recommend gentle stretching exercises such as pendulum swings.  That exercise was demonstrated.  Also recommend judicious use of anti-inflammatory.  Will most likely need an MRI of his shoulder.  He is still very robust and active and he would be interested in pursuing surgery should that be an option.           Return if symptoms worsen or fail to improve.    Call with any problems or concerns before next visit

## 2020-01-10 ENCOUNTER — TELEPHONE (OUTPATIENT)
Dept: FAMILY MEDICINE CLINIC | Facility: CLINIC | Age: 80
End: 2020-01-10

## 2020-01-10 DIAGNOSIS — M75.102 TEAR OF LEFT ROTATOR CUFF, UNSPECIFIED TEAR EXTENT, UNSPECIFIED WHETHER TRAUMATIC: Primary | ICD-10-CM

## 2020-01-10 NOTE — TELEPHONE ENCOUNTER
Please tell Yordy that his left shoulder x-ray looked really pretty good.  Minimal arthritis, if any about the shoulder joint.  At this point, given his limited range of motion and pain, I think we should proceed with an MRI of his left shoulder.  Please place an order for MRI left shoulder without contrast diagnosis is left rotator cuff tear (clinical diagnosis)    Thanks!

## 2020-01-10 NOTE — TELEPHONE ENCOUNTER
Called patient. Patient's identity verified. Advised him of the above. He voiced understanding and wants to have it completed at [facility]. Order placed.

## 2020-01-24 ENCOUNTER — TELEPHONE (OUTPATIENT)
Dept: FAMILY MEDICINE CLINIC | Facility: CLINIC | Age: 80
End: 2020-01-24

## 2020-01-24 DIAGNOSIS — M75.122 COMPLETE TEAR OF LEFT ROTATOR CUFF, UNSPECIFIED WHETHER TRAUMATIC: Primary | ICD-10-CM

## 2020-01-24 DIAGNOSIS — M19.012 GLENOHUMERAL ARTHRITIS, LEFT: ICD-10-CM

## 2020-01-24 DIAGNOSIS — M75.102 TEAR OF LEFT ROTATOR CUFF, UNSPECIFIED TEAR EXTENT, UNSPECIFIED WHETHER TRAUMATIC: ICD-10-CM

## 2020-01-24 NOTE — TELEPHONE ENCOUNTER
----- Message from Garima Jain MD sent at 1/24/2020  5:17 PM EST -----  Please tell Mr. Hebert that the MRI of his shoulder showed 1 of the 4 tendons of the rotator cuff was completely torn in one place, and partially torn and another.  He also has very bad degenerated cartilage of the shoulder joint.  There is fluid in this joint as well.  Recommend evaluation by an orthopedic specialist.  I recommended Dr. Mandujano down in [place].  If he is agreeable please place referral to Dr. Mandujano for diagnosis of left rotator cuff tear and glenohumeral arthritis.  Thanks

## 2020-01-24 NOTE — TELEPHONE ENCOUNTER
Called patient. Patient's identity verified. Advised him of MRI results. He voiced somewhat understanding after explaining it to him and that he needs referred to Ortho. States that he will be in the office Mon to s/w this nurse about it. Ortho referral placed.

## 2020-01-27 RX ORDER — TAMSULOSIN HYDROCHLORIDE 0.4 MG/1
1 CAPSULE ORAL DAILY
Qty: 90 CAPSULE | Refills: 3 | Status: SHIPPED | OUTPATIENT
Start: 2020-01-27 | End: 2021-01-08

## 2020-01-27 NOTE — TELEPHONE ENCOUNTER
Medication is loaded and ready to send.    Last OV: 1-8-2020  Next OV: 3-2-2020  Last Labs: 7-22-19

## 2020-01-27 NOTE — TELEPHONE ENCOUNTER
PT NEEDS A REFILL ON HIS TAMSULOSIN 0.4MG -PLEASE SEND TO [facility] WITH 3 REFILLS

## 2020-02-04 ENCOUNTER — OFFICE VISIT (OUTPATIENT)
Dept: CARDIOLOGY | Facility: CLINIC | Age: 80
End: 2020-02-04

## 2020-02-04 VITALS
WEIGHT: 164 LBS | BODY MASS INDEX: 24.29 KG/M2 | OXYGEN SATURATION: 97 % | DIASTOLIC BLOOD PRESSURE: 63 MMHG | SYSTOLIC BLOOD PRESSURE: 116 MMHG | HEART RATE: 59 BPM | HEIGHT: 69 IN

## 2020-02-04 DIAGNOSIS — Z86.79 S/P AAA REPAIR: ICD-10-CM

## 2020-02-04 DIAGNOSIS — I25.10 CHRONIC CORONARY ARTERY DISEASE: ICD-10-CM

## 2020-02-04 DIAGNOSIS — Z98.890 S/P AAA REPAIR: ICD-10-CM

## 2020-02-04 DIAGNOSIS — E78.00 PURE HYPERCHOLESTEROLEMIA: ICD-10-CM

## 2020-02-04 DIAGNOSIS — I71.40 ABDOMINAL AORTIC ANEURYSM (AAA) WITHOUT RUPTURE (HCC): Primary | ICD-10-CM

## 2020-02-04 DIAGNOSIS — I10 ESSENTIAL HYPERTENSION: ICD-10-CM

## 2020-02-04 PROCEDURE — 99213 OFFICE O/P EST LOW 20 MIN: CPT | Performed by: INTERNAL MEDICINE

## 2020-02-04 NOTE — PROGRESS NOTES
"    Subjective:     Encounter Date:02/04/2020      Patient ID: Yordy Hebert is a 79 y.o. male.    Chief Complaint:  History of Present Illness 79 year-old white male with history of coronary disease history of abdominal aortic aneurysm hypertension hyperlipidemia presents to my office for follow-up.  Patient is currently stable without any symptoms of chest pain or shortness of breath at rest on exertion.  No complains of any PND orthopnea.  No palpitation dizziness syncope or swelling of the feet.  Patient has been taking all his medicines regularly.  Patient does not smoke.  He is trying to exercise regularly.  He follows a good diet.    The following portions of the patient's history were reviewed and updated as appropriate: allergies, current medications, past family history, past medical history, past social history, past surgical history and problem list.  Past Medical History:   Diagnosis Date   • Coronary artery disease    • DJD (degenerative joint disease)     (L) hip   • Family history of abdominal aortic aneurysm (AAA) repair 1/31/2012   • Foot pain, left 4/13/2017   • History of AAA (abdominal aortic aneurysm) repair    • Hyperlipidemia    • Hypertension    • Inguinal hernia     right   • Prostatitis      Past Surgical History:   Procedure Laterality Date   • ABDOMINAL AORTIC ANEURYSM REPAIR     • BACK SURGERY  11/17/2016   • CARDIAC CATHETERIZATION     • CATARACT EXTRACTION, BILATERAL     • CORONARY ANGIOPLASTY WITH STENT PLACEMENT     • INGUINAL HERNIA REPAIR     • RENAL ARTERY STENT Left 06/11/2008     /63 (BP Location: Left arm, Patient Position: Sitting, Cuff Size: Adult)   Pulse 59   Ht 175.3 cm (69\")   Wt 74.4 kg (164 lb)   SpO2 97%   BMI 24.22 kg/m²   Family History   Family history unknown: Yes       Current Outpatient Medications:   •  aspirin (ASPIR-LOW) 81 MG EC tablet, ASPIR-LOW 81 MG TBEC, Disp: , Rfl:   •  finasteride (PROSCAR) 5 MG tablet, , Disp: , Rfl:   •  metoprolol " tartrate (LOPRESSOR) 25 MG tablet, TAKE 1 2 (ONE HALF) TABLET BY MOUTH TWICE DAILY, Disp: , Rfl: 4  •  pravastatin (PRAVACHOL) 80 MG tablet, Take 1 tablet by mouth Every Night., Disp: 90 tablet, Rfl: 2  •  tamsulosin (FLOMAX) 0.4 MG capsule 24 hr capsule, Take 1 capsule by mouth Daily., Disp: 90 capsule, Rfl: 3  No Known Allergies  Social History     Socioeconomic History   • Marital status:      Spouse name: Not on file   • Number of children: Not on file   • Years of education: Not on file   • Highest education level: Not on file   Tobacco Use   • Smoking status: Former Smoker     Packs/day: 1.00     Years: 50.00     Pack years: 50.00     Last attempt to quit:      Years since quittin.0   • Smokeless tobacco: Never Used   Substance and Sexual Activity   • Alcohol use: No     Frequency: Never     Review of Systems   Constitution: Negative for fever and malaise/fatigue.   HENT: Negative for congestion and hearing loss.    Eyes: Negative for double vision and visual disturbance.   Cardiovascular: Negative for chest pain, claudication, dyspnea on exertion, leg swelling and syncope.   Respiratory: Negative for cough and shortness of breath.    Endocrine: Negative for cold intolerance.   Skin: Negative for color change and rash.   Musculoskeletal: Negative for arthritis and joint pain.   Gastrointestinal: Negative for abdominal pain and heartburn.   Genitourinary: Negative for hematuria.   Neurological: Negative for excessive daytime sleepiness and dizziness.   Psychiatric/Behavioral: Negative for depression. The patient is not nervous/anxious.    All other systems reviewed and are negative.             Objective:     Physical Exam   Constitutional: He appears well-developed and well-nourished.   HENT:   Head: Normocephalic and atraumatic.   Eyes: Conjunctivae are normal. No scleral icterus.   Neck: Normal range of motion. Neck supple. No JVD present. Carotid bruit is not present.   Cardiovascular: Normal  rate, regular rhythm, S1 normal, S2 normal, normal heart sounds and intact distal pulses. PMI is not displaced.   Pulmonary/Chest: Effort normal and breath sounds normal. He has no wheezes. He has no rales.   Abdominal: Soft. Bowel sounds are normal.   Neurological: He is alert. He has normal strength.   Skin: Skin is warm and dry. No rash noted.     Procedures    Lab Review:       Assessment:          Diagnosis Plan   1. Abdominal aortic aneurysm (AAA) without rupture (CMS/HCC)     2. Chronic coronary artery disease     3. Essential hypertension     4. Pure hypercholesterolemia     5. S/P AAA repair            Plan:       Patient has history of coronary disease and is currently stable on medications  Patient blood pressure and heart rate stable  Patient has history of abdominal aneurysm and status post repair  Patient also has carotid disease and is followed by vascular surgeon  Patient's lipid levels are followed by the primary care doctor.

## 2020-06-03 RX ORDER — PRAVASTATIN SODIUM 80 MG/1
80 TABLET ORAL NIGHTLY
Qty: 90 TABLET | Refills: 2 | Status: SHIPPED | OUTPATIENT
Start: 2020-06-03 | End: 2021-02-24 | Stop reason: SDUPTHER

## 2020-06-03 NOTE — TELEPHONE ENCOUNTER
Date of last refill: 08/26/2019    Is there an Inspect on file:NA    Last appt date:01/08/2020     Next appt date:NONE      Additional information:

## 2020-06-23 ENCOUNTER — OFFICE VISIT (OUTPATIENT)
Dept: FAMILY MEDICINE CLINIC | Facility: CLINIC | Age: 80
End: 2020-06-23

## 2020-06-23 VITALS
HEART RATE: 62 BPM | WEIGHT: 165 LBS | SYSTOLIC BLOOD PRESSURE: 126 MMHG | DIASTOLIC BLOOD PRESSURE: 70 MMHG | BODY MASS INDEX: 24.44 KG/M2 | HEIGHT: 69 IN | TEMPERATURE: 97.7 F | OXYGEN SATURATION: 98 %

## 2020-06-23 DIAGNOSIS — J01.00 ACUTE NON-RECURRENT MAXILLARY SINUSITIS: Primary | ICD-10-CM

## 2020-06-23 PROCEDURE — 99213 OFFICE O/P EST LOW 20 MIN: CPT | Performed by: FAMILY MEDICINE

## 2020-06-23 RX ORDER — AZITHROMYCIN 250 MG/1
TABLET, FILM COATED ORAL
Qty: 6 TABLET | Refills: 0 | Status: SHIPPED | OUTPATIENT
Start: 2020-06-23 | End: 2021-03-02

## 2020-06-23 NOTE — PROGRESS NOTES
Subjective   Yordy Hebert is a 79 y.o. male.   Chief Complaint   Patient presents with   • Nasal Congestion   • Generalized Body Aches       History of Present Illness   Presents to the office today with c/o drainage, head congestion, runny nose,sneezing and general body aches x 2 weeks. Patient has been taking benadryl and flonase with no relief.  No fevers.  Minimal dry cough.  No change in baseline UREÑA.  Lots of sneezing.  Some mid-facial pressure.      Patient Active Problem List    Diagnosis Date Noted   • Left shoulder pain 01/08/2020   • Left rotator cuff tear arthropathy 01/08/2020   • Abdominal aortic aneurysm (CMS/HCC) 11/01/2017     Note Last Updated: 1/30/2020     Had AAA repaired in '08 with a sleeve - repeat CT in 9/19 - no evidence of any leaking.      • Presence of cardiac and vascular implant and graft 11/01/2017   • Peroneal tendinitis 04/13/2017   • Lumbar radiculopathy 06/02/2015     Note Last Updated: 11/13/2019     Had series of injections in his back in 2016.  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   • Impaired fasting glucose 03/03/2015   • Hypertension 09/30/2014   • Chronic coronary artery disease 09/23/2014     Note Last Updated: 11/13/2019     Had MI on 2014 - hed emergent cath - had 2 stents     • S/P AAA repair 08/24/2014   • Carotid bruit 09/24/2013   • Hay fever 03/22/2013   • Inguinal hernia, right 04/27/2012   • Arteriosclerotic vascular disease 01/31/2012   • Benign prostatic hyperplasia with urinary obstruction 01/31/2012   • Degenerative joint disease 01/31/2012   • Inflammatory disease of prostate 01/31/2012   • Systolic murmur 01/31/2012   • Hyperlipidemia 12/20/2011           Past Surgical History:   Procedure Laterality Date   • ABDOMINAL AORTIC ANEURYSM REPAIR     • BACK SURGERY  11/17/2016   • CARDIAC CATHETERIZATION     • CATARACT EXTRACTION, BILATERAL     • CORONARY ANGIOPLASTY WITH STENT PLACEMENT    "  • INGUINAL HERNIA REPAIR     • RENAL ARTERY STENT Left 2008     Current Outpatient Medications on File Prior to Visit   Medication Sig   • aspirin (ASPIR-LOW) 81 MG EC tablet ASPIR-LOW 81 MG TBEC   • finasteride (PROSCAR) 5 MG tablet    • metoprolol tartrate (LOPRESSOR) 25 MG tablet TAKE 1 2 (ONE HALF) TABLET BY MOUTH TWICE DAILY   • pravastatin (PRAVACHOL) 80 MG tablet Take 1 tablet by mouth Every Night.   • tamsulosin (FLOMAX) 0.4 MG capsule 24 hr capsule Take 1 capsule by mouth Daily.     No current facility-administered medications on file prior to visit.      No Known Allergies  Social History     Socioeconomic History   • Marital status:      Spouse name: Not on file   • Number of children: Not on file   • Years of education: Not on file   • Highest education level: Not on file   Tobacco Use   • Smoking status: Former Smoker     Packs/day: 1.00     Years: 50.00     Pack years: 50.00     Last attempt to quit: 2012     Years since quittin.4   • Smokeless tobacco: Never Used   Substance and Sexual Activity   • Alcohol use: No     Frequency: Never     Family History   Family history unknown: Yes     The following portions of the patient's history were reviewed and updated as appropriate: allergies, current medications, past family history, past medical history, past social history, past surgical history and problem list.    Review of Systems   Constitutional: Negative for chills and fever.   Respiratory: Negative for cough and shortness of breath.    Cardiovascular: Negative for chest pain.   Gastrointestinal: Negative for abdominal pain.   Skin: Negative for rash.   Neurological: Negative for light-headedness.       Objective   /70 (BP Location: Right arm, Patient Position: Sitting, Cuff Size: Adult)   Pulse 62   Temp 97.7 °F (36.5 °C) (Infrared)   Ht 175.3 cm (69.02\")   Wt 74.8 kg (165 lb)   SpO2 98%   BMI 24.35 kg/m²   Physical Exam   Constitutional: He is oriented to person, " place, and time. He appears well-developed and well-nourished.   Wears a mask throughout visit   HENT:   Head: Normocephalic and atraumatic.   Nose: Mucosal edema and congestion present. Right sinus exhibits maxillary sinus tenderness. Left sinus exhibits maxillary sinus tenderness.   Eyes: Conjunctivae and EOM are normal.   Neck: Normal range of motion.   Cardiovascular: Normal rate.   Pulmonary/Chest: Effort normal. He has decreased breath sounds. He has no wheezes. He has no rhonchi. He has no rales.   Musculoskeletal: Normal range of motion.   Neurological: He is alert and oriented to person, place, and time.   Skin: Skin is warm and dry. No rash noted.   Psychiatric: He has a normal mood and affect. His behavior is normal.         Assessment/Plan   Diagnoses and all orders for this visit:    1. Acute non-recurrent maxillary sinusitis (Primary)  -     azithromycin (Zithromax Z-Kristopher) 250 MG tablet; Take 2 tablets the first day, then 1 tablet daily for 4 days.  Dispense: 6 tablet; Refill: 0    Make sure to drink lots of water.  Take Z-Kristopher as prescribed.  Consider Mucinex 1 pill twice daily with a full glass of water.  If symptoms do not resolve as expected, or if they worsen, please let me know.           Return if symptoms worsen or fail to improve.    Call with any problems or concerns before next visit

## 2020-08-11 ENCOUNTER — OFFICE VISIT (OUTPATIENT)
Dept: CARDIOLOGY | Facility: CLINIC | Age: 80
End: 2020-08-11

## 2020-08-11 VITALS
HEART RATE: 79 BPM | HEIGHT: 69 IN | BODY MASS INDEX: 24.44 KG/M2 | DIASTOLIC BLOOD PRESSURE: 70 MMHG | OXYGEN SATURATION: 97 % | SYSTOLIC BLOOD PRESSURE: 109 MMHG | WEIGHT: 165 LBS

## 2020-08-11 DIAGNOSIS — I71.40 ABDOMINAL AORTIC ANEURYSM (AAA) WITHOUT RUPTURE (HCC): Primary | ICD-10-CM

## 2020-08-11 DIAGNOSIS — E78.00 PURE HYPERCHOLESTEROLEMIA: ICD-10-CM

## 2020-08-11 DIAGNOSIS — I10 ESSENTIAL HYPERTENSION: ICD-10-CM

## 2020-08-11 DIAGNOSIS — I25.10 CHRONIC CORONARY ARTERY DISEASE: ICD-10-CM

## 2020-08-11 PROCEDURE — 99213 OFFICE O/P EST LOW 20 MIN: CPT | Performed by: INTERNAL MEDICINE

## 2020-08-11 NOTE — PROGRESS NOTES
"    Subjective:     Encounter Date:08/11/2020      Patient ID: Yordy Hebert is a 79 y.o. male.    Chief Complaint: Coronary Artery Disease  History of Present Illness 79-year-old white male with history of coronary disease history of abdominal aortic aneurysm was repaired peripheral artery disease hypertension hyperlipidemia presents to my office for follow-up.  Patient is currently stable without any symptoms of chest pain or shortness of breath at rest on exertion.  No complains of any PND orthopnea.  No palpitation dizziness syncope or swelling of the feet.  He is taking his meds regularly but he does not smoke.    The following portions of the patient's history were reviewed and updated as appropriate: allergies, current medications, past family history, past medical history, past social history, past surgical history and problem list.  Past Medical History:   Diagnosis Date   • Coronary artery disease    • DJD (degenerative joint disease)     (L) hip   • Family history of abdominal aortic aneurysm (AAA) repair 1/31/2012   • Foot pain, left 4/13/2017   • History of AAA (abdominal aortic aneurysm) repair    • Hyperlipidemia    • Hypertension    • Inguinal hernia     right   • Prostatitis      Past Surgical History:   Procedure Laterality Date   • ABDOMINAL AORTIC ANEURYSM REPAIR     • BACK SURGERY  11/17/2016   • CARDIAC CATHETERIZATION     • CATARACT EXTRACTION, BILATERAL     • CORONARY ANGIOPLASTY WITH STENT PLACEMENT     • INGUINAL HERNIA REPAIR     • RENAL ARTERY STENT Left 06/11/2008     /70 (BP Location: Left arm, Patient Position: Sitting, Cuff Size: Adult)   Pulse 79   Ht 175.3 cm (69\")   Wt 74.8 kg (165 lb)   SpO2 97%   BMI 24.37 kg/m²   Family History   Family history unknown: Yes       Current Outpatient Medications:   •  aspirin (ASPIR-LOW) 81 MG EC tablet, ASPIR-LOW 81 MG TBEC, Disp: , Rfl:   •  azithromycin (Zithromax Z-Kristopher) 250 MG tablet, Take 2 tablets the first day, then 1 tablet " daily for 4 days., Disp: 6 tablet, Rfl: 0  •  finasteride (PROSCAR) 5 MG tablet, , Disp: , Rfl:   •  metoprolol tartrate (LOPRESSOR) 25 MG tablet, Take 0.5 tablets by mouth 2 (Two) Times a Day., Disp: 90 tablet, Rfl: 3  •  pravastatin (PRAVACHOL) 80 MG tablet, Take 1 tablet by mouth Every Night., Disp: 90 tablet, Rfl: 2  •  tamsulosin (FLOMAX) 0.4 MG capsule 24 hr capsule, Take 1 capsule by mouth Daily., Disp: 90 capsule, Rfl: 3  No Known Allergies  Social History     Socioeconomic History   • Marital status:      Spouse name: Not on file   • Number of children: Not on file   • Years of education: Not on file   • Highest education level: Not on file   Tobacco Use   • Smoking status: Former Smoker     Packs/day: 1.00     Years: 50.00     Pack years: 50.00     Last attempt to quit:      Years since quittin.6   • Smokeless tobacco: Never Used   Substance and Sexual Activity   • Alcohol use: No     Frequency: Never     Review of Systems   Constitution: Negative for fever and malaise/fatigue.   HENT: Negative for congestion and hearing loss.    Eyes: Negative for double vision and visual disturbance.   Cardiovascular: Negative for chest pain, claudication, dyspnea on exertion, leg swelling and syncope.   Respiratory: Negative for cough and shortness of breath.    Endocrine: Negative for cold intolerance.   Skin: Negative for color change and rash.   Musculoskeletal: Negative for arthritis and joint pain.   Gastrointestinal: Negative for abdominal pain and heartburn.   Genitourinary: Negative for hematuria.   Neurological: Negative for excessive daytime sleepiness and dizziness.   Psychiatric/Behavioral: Negative for depression. The patient is not nervous/anxious.    All other systems reviewed and are negative.             Objective:     Physical Exam   Constitutional: He appears well-developed and well-nourished.   HENT:   Head: Normocephalic and atraumatic.   Eyes: Conjunctivae are normal. No scleral  icterus.   Neck: Normal range of motion. Neck supple. No JVD present. Carotid bruit is not present.   Cardiovascular: Normal rate, regular rhythm, S1 normal, S2 normal, normal heart sounds and intact distal pulses. PMI is not displaced.   Pulmonary/Chest: Effort normal and breath sounds normal. He has no wheezes. He has no rales.   Abdominal: Soft. Bowel sounds are normal.   Neurological: He is alert. He has normal strength.   Skin: Skin is warm and dry. No rash noted.     Procedures    Lab Review:       Assessment:          Diagnosis Plan   1. Abdominal aortic aneurysm (AAA) without rupture (CMS/HCC)     2. Chronic coronary artery disease     3. Essential hypertension     4. Pure hypercholesterolemia            Plan:     Has history of coronary disease with nonobstructive disease and normal LV systolic function  Patient has history of abdominal aortic aneurysm status post repair and is currently followed by vascular surgeon  Patient blood pressure and heart rate are stable  Patient's lipid levels are followed by the primary care doctor  Continue current medicines and follow him in 6 months

## 2020-08-12 ENCOUNTER — TELEPHONE (OUTPATIENT)
Dept: FAMILY MEDICINE CLINIC | Facility: CLINIC | Age: 80
End: 2020-08-12

## 2020-08-12 DIAGNOSIS — E78.2 MIXED HYPERLIPIDEMIA: Primary | ICD-10-CM

## 2020-08-12 DIAGNOSIS — I25.10 CHRONIC CORONARY ARTERY DISEASE: ICD-10-CM

## 2020-08-17 ENCOUNTER — TELEPHONE (OUTPATIENT)
Dept: FAMILY MEDICINE CLINIC | Facility: CLINIC | Age: 80
End: 2020-08-17

## 2020-08-17 NOTE — TELEPHONE ENCOUNTER
No, current screening guidelines recommend discontinuing PSA screening at 75 years old.  Unless the PSA is being used to follow for recurrence of cancer following a prostatectomy, I do not medically know why we would continue to follow his PSA.  Thanks

## 2020-08-17 NOTE — TELEPHONE ENCOUNTER
Patient came in for labs but wanting to know if you wanted a psa because it has been a year. Please let me know if it needs to be added. thanks

## 2020-08-18 LAB
ALBUMIN SERPL-MCNC: 4.6 G/DL (ref 3.7–4.7)
ALBUMIN/GLOB SERPL: 2.1 {RATIO} (ref 1.2–2.2)
ALP SERPL-CCNC: 85 IU/L (ref 39–117)
ALT SERPL-CCNC: 16 IU/L (ref 0–44)
AST SERPL-CCNC: 22 IU/L (ref 0–40)
BASOPHILS # BLD AUTO: 0 X10E3/UL (ref 0–0.2)
BASOPHILS NFR BLD AUTO: 0 %
BILIRUB SERPL-MCNC: 0.9 MG/DL (ref 0–1.2)
BUN SERPL-MCNC: 14 MG/DL (ref 8–27)
BUN/CREAT SERPL: 17 (ref 10–24)
CALCIUM SERPL-MCNC: 9.8 MG/DL (ref 8.6–10.2)
CHLORIDE SERPL-SCNC: 102 MMOL/L (ref 96–106)
CHOLEST SERPL-MCNC: 164 MG/DL (ref 100–199)
CO2 SERPL-SCNC: 28 MMOL/L (ref 20–29)
CREAT SERPL-MCNC: 0.83 MG/DL (ref 0.76–1.27)
EOSINOPHIL # BLD AUTO: 0.3 X10E3/UL (ref 0–0.4)
EOSINOPHIL NFR BLD AUTO: 7 %
ERYTHROCYTE [DISTWIDTH] IN BLOOD BY AUTOMATED COUNT: 12.8 % (ref 11.6–15.4)
GLOBULIN SER CALC-MCNC: 2.2 G/DL (ref 1.5–4.5)
GLUCOSE SERPL-MCNC: 104 MG/DL (ref 65–99)
HCT VFR BLD AUTO: 42.6 % (ref 37.5–51)
HDLC SERPL-MCNC: 54 MG/DL
HGB BLD-MCNC: 13.9 G/DL (ref 13–17.7)
IMM GRANULOCYTES # BLD AUTO: 0 X10E3/UL (ref 0–0.1)
IMM GRANULOCYTES NFR BLD AUTO: 0 %
LDLC SERPL CALC-MCNC: 91 MG/DL (ref 0–99)
LYMPHOCYTES # BLD AUTO: 1.3 X10E3/UL (ref 0.7–3.1)
LYMPHOCYTES NFR BLD AUTO: 28 %
MCH RBC QN AUTO: 31.4 PG (ref 26.6–33)
MCHC RBC AUTO-ENTMCNC: 32.6 G/DL (ref 31.5–35.7)
MCV RBC AUTO: 96 FL (ref 79–97)
MONOCYTES # BLD AUTO: 0.5 X10E3/UL (ref 0.1–0.9)
MONOCYTES NFR BLD AUTO: 11 %
NEUTROPHILS # BLD AUTO: 2.5 X10E3/UL (ref 1.4–7)
NEUTROPHILS NFR BLD AUTO: 54 %
PLATELET # BLD AUTO: 151 X10E3/UL (ref 150–450)
POTASSIUM SERPL-SCNC: 4.5 MMOL/L (ref 3.5–5.2)
PROT SERPL-MCNC: 6.8 G/DL (ref 6–8.5)
RBC # BLD AUTO: 4.43 X10E6/UL (ref 4.14–5.8)
SODIUM SERPL-SCNC: 142 MMOL/L (ref 134–144)
TRIGL SERPL-MCNC: 95 MG/DL (ref 0–149)
VLDLC SERPL CALC-MCNC: 19 MG/DL (ref 5–40)
WBC # BLD AUTO: 4.6 X10E3/UL (ref 3.4–10.8)

## 2021-01-08 RX ORDER — TAMSULOSIN HYDROCHLORIDE 0.4 MG/1
CAPSULE ORAL
Qty: 90 CAPSULE | Refills: 3 | Status: SHIPPED | OUTPATIENT
Start: 2021-01-08

## 2021-02-24 RX ORDER — PRAVASTATIN SODIUM 80 MG/1
80 TABLET ORAL NIGHTLY
Qty: 90 TABLET | Refills: 1 | Status: SHIPPED | OUTPATIENT
Start: 2021-02-24 | End: 2021-08-23 | Stop reason: SDUPTHER

## 2021-03-02 ENCOUNTER — OFFICE VISIT (OUTPATIENT)
Dept: CARDIOLOGY | Facility: CLINIC | Age: 81
End: 2021-03-02

## 2021-03-02 VITALS
HEIGHT: 69 IN | DIASTOLIC BLOOD PRESSURE: 73 MMHG | WEIGHT: 168 LBS | OXYGEN SATURATION: 99 % | SYSTOLIC BLOOD PRESSURE: 135 MMHG | BODY MASS INDEX: 24.88 KG/M2 | HEART RATE: 60 BPM

## 2021-03-02 DIAGNOSIS — I10 ESSENTIAL HYPERTENSION: ICD-10-CM

## 2021-03-02 DIAGNOSIS — I25.10 CHRONIC CORONARY ARTERY DISEASE: ICD-10-CM

## 2021-03-02 DIAGNOSIS — E78.00 PURE HYPERCHOLESTEROLEMIA: ICD-10-CM

## 2021-03-02 DIAGNOSIS — I71.40 ABDOMINAL AORTIC ANEURYSM (AAA) WITHOUT RUPTURE (HCC): Primary | ICD-10-CM

## 2021-03-02 PROCEDURE — 99214 OFFICE O/P EST MOD 30 MIN: CPT | Performed by: INTERNAL MEDICINE

## 2021-03-02 NOTE — PROGRESS NOTES
"    Subjective:     Encounter Date:03/02/2021      Patient ID: Yordy Hebert is a 80 y.o. male.    Chief Complaint:  History of Present Illness 80-year-old white male with history of coronary disease history of abdominal aortic aneurysm status post repair hypertension hyperlipidemia presents to my office for follow-up.  Patient is currently stable without any symptoms of chest pain or shortness of breath at rest on exertion.  No complaints any PND orthopnea.  No palpitation dizziness syncope or swelling of the feet.  Patient has been taking all the medicines regularly.  Patient does not smoke.  He is trying to exercise regularly follows a good diet.    The following portions of the patient's history were reviewed and updated as appropriate: allergies, current medications, past family history, past medical history, past social history, past surgical history and problem list.  Past Medical History:   Diagnosis Date   • Coronary artery disease    • DJD (degenerative joint disease)     (L) hip   • Family history of abdominal aortic aneurysm (AAA) repair 1/31/2012   • Foot pain, left 4/13/2017   • History of AAA (abdominal aortic aneurysm) repair    • Hyperlipidemia    • Hypertension    • Inguinal hernia     right   • Prostatitis      Past Surgical History:   Procedure Laterality Date   • ABDOMINAL AORTIC ANEURYSM REPAIR     • BACK SURGERY  11/17/2016   • CARDIAC CATHETERIZATION     • CATARACT EXTRACTION, BILATERAL     • CORONARY ANGIOPLASTY WITH STENT PLACEMENT     • INGUINAL HERNIA REPAIR     • RENAL ARTERY STENT Left 06/11/2008     /73 (BP Location: Left arm, Patient Position: Sitting)   Pulse 60   Ht 175.3 cm (69\")   Wt 76.2 kg (168 lb)   SpO2 99%   BMI 24.81 kg/m²   Family History   Family history unknown: Yes       Current Outpatient Medications:   •  aspirin (ASPIR-LOW) 81 MG EC tablet, ASPIR-LOW 81 MG TBEC, Disp: , Rfl:   •  finasteride (PROSCAR) 5 MG tablet, , Disp: , Rfl:   •  metoprolol tartrate " (LOPRESSOR) 25 MG tablet, Take 1/2 (one-half) tablet by mouth twice daily, Disp: 90 tablet, Rfl: 3  •  pravastatin (PRAVACHOL) 80 MG tablet, Take 1 tablet by mouth Every Night., Disp: 90 tablet, Rfl: 1  •  tamsulosin (FLOMAX) 0.4 MG capsule 24 hr capsule, TAKE 1 CAPSULE DAILY, Disp: 90 capsule, Rfl: 3  No Known Allergies  Social History     Socioeconomic History   • Marital status:      Spouse name: Not on file   • Number of children: Not on file   • Years of education: Not on file   • Highest education level: Not on file   Tobacco Use   • Smoking status: Current Some Day Smoker     Packs/day: 1.00     Years: 50.00     Pack years: 50.00     Last attempt to quit:      Years since quittin.1   • Smokeless tobacco: Never Used   Substance and Sexual Activity   • Alcohol use: No     Frequency: Never     Review of Systems   Constitution: Negative for fever and malaise/fatigue.   Cardiovascular: Negative for chest pain, dyspnea on exertion and palpitations.   Respiratory: Negative for cough and shortness of breath.    Skin: Negative for rash.   Gastrointestinal: Negative for abdominal pain, nausea and vomiting.   Neurological: Negative for focal weakness and headaches.   All other systems reviewed and are negative.             Objective:     Constitutional:       Appearance: Well-developed.   Eyes:      General: No scleral icterus.     Conjunctiva/sclera: Conjunctivae normal.   HENT:      Head: Normocephalic and atraumatic.   Neck:      Musculoskeletal: Normal range of motion and neck supple.      Vascular: No carotid bruit or JVD.   Pulmonary:      Effort: Pulmonary effort is normal.      Breath sounds: Normal breath sounds. No wheezing. No rales.   Cardiovascular:      Normal rate. Regular rhythm.   Pulses:     Intact distal pulses.   Abdominal:      General: Bowel sounds are normal.      Palpations: Abdomen is soft.   Skin:     General: Skin is warm and dry.      Findings: No rash.   Neurological:       Mental Status: Alert.       Procedures    Lab Review:         MDM  1.  Coronary disease  Patient has nonobstructive coronary disease with normal LV function is currently stable on medications  2.  Abdominal aortic aneurysm  Patient has abdominal aortic aneurysm repair and is followed by vascular surgeon  3.  Hypertension  Patient blood pressure currently stable on metoprolol  4.  Hyperlipidemia  Patient's lipid levels are followed by the primary care doctor and is on a statin.

## 2021-05-11 ENCOUNTER — OFFICE VISIT (OUTPATIENT)
Dept: FAMILY MEDICINE CLINIC | Facility: CLINIC | Age: 81
End: 2021-05-11

## 2021-05-11 VITALS
SYSTOLIC BLOOD PRESSURE: 138 MMHG | HEIGHT: 69 IN | BODY MASS INDEX: 24.38 KG/M2 | DIASTOLIC BLOOD PRESSURE: 80 MMHG | HEART RATE: 57 BPM | OXYGEN SATURATION: 98 % | WEIGHT: 164.6 LBS | TEMPERATURE: 98 F

## 2021-05-11 DIAGNOSIS — M54.16 LUMBAR RADICULOPATHY: Primary | ICD-10-CM

## 2021-05-11 PROCEDURE — 99213 OFFICE O/P EST LOW 20 MIN: CPT | Performed by: FAMILY MEDICINE

## 2021-05-11 RX ORDER — HYDROCODONE BITARTRATE AND ACETAMINOPHEN 7.5; 325 MG/1; MG/1
1 TABLET ORAL EVERY 8 HOURS PRN
COMMUNITY
Start: 2021-04-23 | End: 2022-06-30

## 2021-05-11 NOTE — PROGRESS NOTES
Subjective   Yordy Hebert is a 80 y.o. male.   Chief Complaint   Patient presents with   • Back Pain       History of Present Illness   Presents to the office today for what the schedule says is back pain.  I last saw Yordy for a sinus infection nearly a year ago.  He has a known history of lumbar degenerative disc disease with radiculopathy.  He had surgery once to treat sciatica.  He had a series of injections in his back back in 2016.  He was evaluated by Dr. Haas about every 2 weeks from late February through March 2020.  Epic does not give me full details but it says he had an outpatient procedure at [facility], [place], operated by [facility].  I do have access to a lumbar spine MRI from Oct, 2018:  IMPRESSION:  Interval decompression laminectomy at the L4-5 level.  There is persistent  neural foraminal narrowing at this level predominantly from facet arthropathy  and endplate spurring.  No central canal narrowing.  There is also moderate  multilevel degenerative change throughout the remainder of the lumbar spine  due to bony hypertrophy as detailed above.  This causes severe right and  moderate to severe left neural foraminal narrowing of the L2, L3, L4 and L5  neural foramen.          Clint Sanches DO  Looks like this was ordered by David Domínguez, spine surgeon.    He came to see me today because his second injection that he had in his back just over a week ago caused more pain than any injection he ever had before. Dr. Haas is based out of [place] and only comes over to [place] once a month.  He did not want to have to go to [place].  He did not know what to do.    He explains that the pain in his lower back runs down into his left buttock and down into his leg.  This is the same kind of pain he had before he had his back surgery several years ago.  That surgery was done by Dr. Domínguez.    Patient Active Problem List    Diagnosis Date Noted   • Left shoulder pain 01/08/2020   • Left rotator cuff tear arthropathy  01/08/2020   • Abdominal aortic aneurysm (CMS/HCC) 11/01/2017     Note Last Updated: 1/30/2020     Had AAA repaired in '08 with a sleeve - repeat CT in 9/19 - no evidence of any leaking.      • Presence of cardiac and vascular implant and graft 11/01/2017   • Peroneal tendinitis 04/13/2017   • Lumbar radiculopathy 06/02/2015     Note Last Updated: 11/13/2019     Had series of injections in his back in 2016.  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   • Impaired fasting glucose 03/03/2015   • Hypertension 09/30/2014   • Chronic coronary artery disease 09/23/2014     Note Last Updated: 11/13/2019     Had MI on 2014 - hed emergent cath - had 2 stents     • S/P AAA repair 08/24/2014   • Carotid bruit 09/24/2013   • Hay fever 03/22/2013   • Inguinal hernia, right 04/27/2012   • Arteriosclerotic vascular disease 01/31/2012   • Benign prostatic hyperplasia with urinary obstruction 01/31/2012   • Degenerative joint disease 01/31/2012   • Inflammatory disease of prostate 01/31/2012   • Systolic murmur 01/31/2012   • Hyperlipidemia 12/20/2011           Past Surgical History:   Procedure Laterality Date   • ABDOMINAL AORTIC ANEURYSM REPAIR     • BACK SURGERY  11/17/2016   • CARDIAC CATHETERIZATION     • CATARACT EXTRACTION, BILATERAL     • CORONARY ANGIOPLASTY WITH STENT PLACEMENT     • INGUINAL HERNIA REPAIR     • RENAL ARTERY STENT Left 06/11/2008     Current Outpatient Medications on File Prior to Visit   Medication Sig   • aspirin (ASPIR-LOW) 81 MG EC tablet ASPIR-LOW 81 MG TBEC   • finasteride (PROSCAR) 5 MG tablet    • HYDROcodone-acetaminophen (NORCO) 7.5-325 MG per tablet    • metoprolol tartrate (LOPRESSOR) 25 MG tablet Takes 1/2 tablet by mouth twice daily.   • pravastatin (PRAVACHOL) 80 MG tablet Take 1 tablet by mouth Every Night.   • tamsulosin (FLOMAX) 0.4 MG capsule 24 hr capsule TAKE 1 CAPSULE DAILY     No current facility-administered  "medications on file prior to visit.     No Known Allergies  Social History     Socioeconomic History   • Marital status:      Spouse name: Not on file   • Number of children: Not on file   • Years of education: Not on file   • Highest education level: Not on file   Tobacco Use   • Smoking status: Current Some Day Smoker     Packs/day: 1.00     Years: 50.00     Pack years: 50.00     Last attempt to quit:      Years since quittin.3   • Smokeless tobacco: Never Used   Substance and Sexual Activity   • Alcohol use: No     Family History   Family history unknown: Yes       Review of Systems    Objective   /80 (BP Location: Left arm, Patient Position: Sitting, Cuff Size: Adult)   Pulse 57   Temp 98 °F (36.7 °C) (Infrared)   Ht 175.3 cm (69.02\")   Wt 74.7 kg (164 lb 9.6 oz)   SpO2 98%   BMI 24.30 kg/m²   Physical Exam  Constitutional:       Appearance: He is well-developed.      Comments: Wearing a face mask     HENT:      Head: Normocephalic and atraumatic.   Eyes:      Conjunctiva/sclera: Conjunctivae normal.   Cardiovascular:      Rate and Rhythm: Normal rate.   Pulmonary:      Effort: Pulmonary effort is normal.   Musculoskeletal:         General: Normal range of motion.      Cervical back: Normal range of motion.   Skin:     General: Skin is warm and dry.      Findings: No rash.   Neurological:      Mental Status: He is alert and oriented to person, place, and time.      Gait: Gait abnormal (slightly stooped, antalgic gait).   Psychiatric:         Behavior: Behavior normal.       Assessment/Plan   Diagnoses and all orders for this visit:    1. Lumbar radiculopathy (Primary)        He does have a pain management contract with Dr. Haas.  I explained that if I gave him more Norco, that it would invalidate his pain contract.  Neither 1 of us want that.  I advised to call Dr. Haas today or tomorrow and explain that he is having more pain after the second injection.  They may revisit whether to do " the third or not.  I suspect they will try to accommodate him with adjustments to his pain pills as well.    Because this pain is going down his leg, he may have a recurrent nerve root compression.  That is what his surgery was for back in 2018.  He asks if he should see Dr. Domínguez again.  I think that is also a good idea and I have recommended that.  If he winds up needing an MRI before Dr. Domínguez will see him, I will try to help with that as well.  Ultimately if the decision is made that he no longer is a candidate for any type of surgery but he cannot travel far enough to continue to see Dr. Haas, then I would be happy to work with him to get him transferred to our [facility] pain management clinic in [place] or [place].  He will keep me posted.  Overall, I spent 20 minutes today with Yordy counseling him regarding pain management practices and advising on a course of action.       Return if symptoms worsen or fail to improve.    Call with any problems or concerns before next visit

## 2021-07-20 ENCOUNTER — TELEPHONE (OUTPATIENT)
Dept: FAMILY MEDICINE CLINIC | Facility: CLINIC | Age: 81
End: 2021-07-20

## 2021-07-20 DIAGNOSIS — I10 ESSENTIAL HYPERTENSION: ICD-10-CM

## 2021-07-20 DIAGNOSIS — E78.2 MIXED HYPERLIPIDEMIA: Primary | ICD-10-CM

## 2021-07-20 NOTE — TELEPHONE ENCOUNTER
Certainly, please tell Yordy that I have placed orders for lab work.  He can come in at a mutually convenient time to have those drawn.  I will follow-up with him by phone with results.  Thanks

## 2021-07-22 ENCOUNTER — TELEPHONE (OUTPATIENT)
Dept: FAMILY MEDICINE CLINIC | Facility: CLINIC | Age: 81
End: 2021-07-22

## 2021-07-22 DIAGNOSIS — Z12.5 SCREENING FOR PROSTATE CANCER: Primary | ICD-10-CM

## 2021-07-22 NOTE — TELEPHONE ENCOUNTER
Patient was here today asking if he can get a PSA drawn as well. Last one was 08/2019 that you ordered.

## 2021-07-23 NOTE — TELEPHONE ENCOUNTER
I placed an order for a PSA to be added to his labs.  If he has already had the labs drawn, please see if this can be added to the blood that was already drawn.  Thanks

## 2021-07-23 NOTE — TELEPHONE ENCOUNTER
I called patient to let him know he can come get his labs drawn when its convenient for him, all his labs plus the PSA screen.

## 2021-07-30 ENCOUNTER — OFFICE VISIT (OUTPATIENT)
Dept: FAMILY MEDICINE CLINIC | Facility: CLINIC | Age: 81
End: 2021-07-30

## 2021-07-30 VITALS
BODY MASS INDEX: 24.59 KG/M2 | WEIGHT: 166 LBS | TEMPERATURE: 98.4 F | HEART RATE: 61 BPM | OXYGEN SATURATION: 98 % | HEIGHT: 69 IN | DIASTOLIC BLOOD PRESSURE: 72 MMHG | SYSTOLIC BLOOD PRESSURE: 122 MMHG

## 2021-07-30 DIAGNOSIS — E78.2 MIXED HYPERLIPIDEMIA: ICD-10-CM

## 2021-07-30 DIAGNOSIS — I10 ESSENTIAL HYPERTENSION: ICD-10-CM

## 2021-07-30 DIAGNOSIS — N40.1 BENIGN PROSTATIC HYPERPLASIA WITH URINARY OBSTRUCTION: ICD-10-CM

## 2021-07-30 DIAGNOSIS — J30.1 HAY FEVER: Primary | ICD-10-CM

## 2021-07-30 DIAGNOSIS — N13.8 BENIGN PROSTATIC HYPERPLASIA WITH URINARY OBSTRUCTION: ICD-10-CM

## 2021-07-30 PROCEDURE — 99214 OFFICE O/P EST MOD 30 MIN: CPT | Performed by: FAMILY MEDICINE

## 2021-07-30 RX ORDER — FEXOFENADINE HCL 180 MG/1
180 TABLET ORAL DAILY
Qty: 90 TABLET | Refills: 3 | Status: SHIPPED | OUTPATIENT
Start: 2021-07-30

## 2021-07-30 RX ORDER — FLUTICASONE PROPIONATE 50 MCG
2 SPRAY, SUSPENSION (ML) NASAL DAILY
Qty: 18.2 ML | Refills: 0 | Status: SHIPPED | OUTPATIENT
Start: 2021-07-30

## 2021-08-23 RX ORDER — PRAVASTATIN SODIUM 80 MG/1
80 TABLET ORAL NIGHTLY
Qty: 90 TABLET | Refills: 1 | Status: SHIPPED | OUTPATIENT
Start: 2021-08-23 | End: 2022-02-21 | Stop reason: SDUPTHER

## 2021-09-07 ENCOUNTER — OFFICE VISIT (OUTPATIENT)
Dept: CARDIOLOGY | Facility: CLINIC | Age: 81
End: 2021-09-07

## 2021-09-07 VITALS
BODY MASS INDEX: 24.44 KG/M2 | HEIGHT: 69 IN | HEART RATE: 65 BPM | WEIGHT: 165 LBS | SYSTOLIC BLOOD PRESSURE: 118 MMHG | OXYGEN SATURATION: 97 % | DIASTOLIC BLOOD PRESSURE: 62 MMHG

## 2021-09-07 DIAGNOSIS — E78.00 PURE HYPERCHOLESTEROLEMIA: ICD-10-CM

## 2021-09-07 DIAGNOSIS — I10 ESSENTIAL HYPERTENSION: ICD-10-CM

## 2021-09-07 DIAGNOSIS — I25.10 CHRONIC CORONARY ARTERY DISEASE: ICD-10-CM

## 2021-09-07 DIAGNOSIS — I71.40 ABDOMINAL AORTIC ANEURYSM (AAA) WITHOUT RUPTURE (HCC): Primary | ICD-10-CM

## 2021-09-07 PROCEDURE — 99214 OFFICE O/P EST MOD 30 MIN: CPT | Performed by: INTERNAL MEDICINE

## 2021-09-07 NOTE — PROGRESS NOTES
Subjective:     Encounter Date:09/07/2021      Patient ID: Yordy Hebert is a 80 y.o. male.    Chief Complaint: Abdominal aortic aneurysm  History of Present Illness 80-year-old white male with history of coronary disease history of abdominal aortic aneurysm status post repair history of hypertension hyperlipidemia presents to my office for follow-up.  Patient is currently stable without any symptoms of chest pain or shortness of breath at rest on exertion.  No complaints any PND orthopnea.  No palpitation dizziness syncope or swelling of the feet.  Patient has been taking all her meds regularly.  Patient does not smoke.  Patient is trying to exercise regularly patient follows a good diet.    The following portions of the patient's history were reviewed and updated as appropriate: allergies, current medications, past family history, past medical history, past social history, past surgical history and problem list.  Past Medical History:   Diagnosis Date   • Coronary artery disease    • DJD (degenerative joint disease)     (L) hip   • Family history of abdominal aortic aneurysm (AAA) repair 1/31/2012   • Foot pain, left 4/13/2017   • History of AAA (abdominal aortic aneurysm) repair    • Hyperlipidemia    • Hypertension    • Inguinal hernia     right   • Prostatitis      Past Surgical History:   Procedure Laterality Date   • ABDOMINAL AORTIC ANEURYSM REPAIR     • BACK SURGERY  11/17/2016   • CARDIAC CATHETERIZATION     • CATARACT EXTRACTION, BILATERAL     • CORONARY ANGIOPLASTY WITH STENT PLACEMENT     • INGUINAL HERNIA REPAIR     • RENAL ARTERY STENT Left 06/11/2008     There were no vitals taken for this visit.  Family History   Family history unknown: Yes       Current Outpatient Medications:   •  aspirin (ASPIR-LOW) 81 MG EC tablet, ASPIR-LOW 81 MG TBEC, Disp: , Rfl:   •  fexofenadine (Allegra Allergy) 180 MG tablet, Take 1 tablet by mouth Daily., Disp: 90 tablet, Rfl: 3  •  finasteride (PROSCAR) 5 MG  tablet, , Disp: , Rfl:   •  fluticasone (Flonase) 50 MCG/ACT nasal spray, 2 sprays into the nostril(s) as directed by provider Daily., Disp: 18.2 mL, Rfl: 0  •  HYDROcodone-acetaminophen (NORCO) 7.5-325 MG per tablet, , Disp: , Rfl:   •  metoprolol tartrate (LOPRESSOR) 25 MG tablet, Takes 1/2 tablet by mouth twice daily., Disp: 90 tablet, Rfl: 3  •  pravastatin (PRAVACHOL) 80 MG tablet, Take 1 tablet by mouth Every Night., Disp: 90 tablet, Rfl: 1  •  tamsulosin (FLOMAX) 0.4 MG capsule 24 hr capsule, TAKE 1 CAPSULE DAILY, Disp: 90 capsule, Rfl: 3  No Known Allergies  Social History     Socioeconomic History   • Marital status:      Spouse name: Not on file   • Number of children: Not on file   • Years of education: Not on file   • Highest education level: Not on file   Tobacco Use   • Smoking status: Former Smoker     Packs/day: 1.00     Years: 50.00     Pack years: 50.00     Quit date:      Years since quittin.6   • Smokeless tobacco: Never Used   Substance and Sexual Activity   • Alcohol use: No     Review of Systems   Constitutional: Negative for fever and malaise/fatigue.   HENT: Negative for congestion and hearing loss.    Eyes: Negative for double vision and visual disturbance.   Cardiovascular: Negative for chest pain, claudication, dyspnea on exertion, leg swelling and syncope.   Respiratory: Negative for cough and shortness of breath.    Endocrine: Negative for cold intolerance.   Skin: Negative for color change and rash.   Musculoskeletal: Negative for arthritis and joint pain.   Gastrointestinal: Negative for abdominal pain and heartburn.   Genitourinary: Negative for hematuria.   Neurological: Negative for excessive daytime sleepiness and dizziness.   Psychiatric/Behavioral: Negative for depression. The patient is not nervous/anxious.    All other systems reviewed and are negative.             Objective:     Constitutional:       Appearance: Well-developed.   Eyes:      General: No scleral  icterus.     Conjunctiva/sclera: Conjunctivae normal.   HENT:      Head: Normocephalic and atraumatic.   Neck:      Vascular: No carotid bruit or JVD.   Pulmonary:      Effort: Pulmonary effort is normal.      Breath sounds: Normal breath sounds. No wheezing. No rales.   Cardiovascular:      Normal rate. Regular rhythm.   Pulses:     Intact distal pulses.   Abdominal:      General: Bowel sounds are normal.      Palpations: Abdomen is soft.   Musculoskeletal:      Cervical back: Normal range of motion and neck supple. Skin:     General: Skin is warm and dry.      Findings: No rash.   Neurological:      Mental Status: Alert.       Procedures    Lab Review:         MDM  1.  Coronary heart disease  Patient has nonobstructive coronary disease with normal V function is currently stable on medications  2.  Abdominal aortic aneurysm  Patient has history of abdominal aortic aneurysm and is status post repair  3.  Hypertension  Patient blood pressure currently stable on medications  4.  Hyperlipidemia  Patient's lipid levels are followed by primary doctor and is on a statin.      Patient's previous medical records, labs, and EKG were reviewed and discussed with the patient at today's visit.

## 2022-02-14 NOTE — TELEPHONE ENCOUNTER
----- Message from Elena Guzmán DO sent at 7/24/2019  7:21 PM EDT -----  Look likes needs appt    used

## 2022-02-21 RX ORDER — PRAVASTATIN SODIUM 80 MG/1
80 TABLET ORAL NIGHTLY
Qty: 90 TABLET | Refills: 1 | Status: SHIPPED | OUTPATIENT
Start: 2022-02-21 | End: 2022-08-02 | Stop reason: SDUPTHER

## 2022-03-15 ENCOUNTER — OFFICE VISIT (OUTPATIENT)
Dept: CARDIOLOGY | Facility: CLINIC | Age: 82
End: 2022-03-15

## 2022-03-15 VITALS
HEIGHT: 69 IN | BODY MASS INDEX: 23.85 KG/M2 | DIASTOLIC BLOOD PRESSURE: 64 MMHG | WEIGHT: 161 LBS | OXYGEN SATURATION: 99 % | HEART RATE: 61 BPM | SYSTOLIC BLOOD PRESSURE: 110 MMHG

## 2022-03-15 DIAGNOSIS — E78.00 PURE HYPERCHOLESTEROLEMIA: ICD-10-CM

## 2022-03-15 DIAGNOSIS — I10 ESSENTIAL HYPERTENSION: ICD-10-CM

## 2022-03-15 DIAGNOSIS — I71.40 ABDOMINAL AORTIC ANEURYSM (AAA) WITHOUT RUPTURE: Primary | ICD-10-CM

## 2022-03-15 DIAGNOSIS — I25.10 CHRONIC CORONARY ARTERY DISEASE: ICD-10-CM

## 2022-03-15 PROCEDURE — 99213 OFFICE O/P EST LOW 20 MIN: CPT | Performed by: INTERNAL MEDICINE

## 2022-03-15 NOTE — PROGRESS NOTES
"    Subjective:     Encounter Date:03/15/2022      Patient ID: Yordy Hebert is a 81 y.o. male.    Chief Complaint:  History of Present Illness 81-year-old white male with history of coronary disease hypertension hyperlipidemia abdominal aortic aneurysm presents to my office for follow-up.  Patient is currently stable without any symptoms of chest pain or shortness of breath at rest or exertion.  No complains any PND or orthopnea.  No palpitation dizziness syncope or swelling of the feet.  Patient has been taking all the medicines regularly.  Patient does not smoke with his diet exercise regularly follows a good diet.    The following portions of the patient's history were reviewed and updated as appropriate: allergies, current medications, past family history, past medical history, past social history, past surgical history and problem list.  Past Medical History:   Diagnosis Date   • Coronary artery disease    • DJD (degenerative joint disease)     (L) hip   • Family history of abdominal aortic aneurysm (AAA) repair 1/31/2012   • Foot pain, left 4/13/2017   • History of AAA (abdominal aortic aneurysm) repair    • Hyperlipidemia    • Hypertension    • Inguinal hernia     right   • Prostatitis      Past Surgical History:   Procedure Laterality Date   • ABDOMINAL AORTIC ANEURYSM REPAIR     • BACK SURGERY  11/17/2016   • CARDIAC CATHETERIZATION     • CATARACT EXTRACTION, BILATERAL     • CORONARY ANGIOPLASTY WITH STENT PLACEMENT     • INGUINAL HERNIA REPAIR     • RENAL ARTERY STENT Left 06/11/2008     /64 (BP Location: Left arm, Patient Position: Sitting)   Pulse 61   Ht 175.3 cm (69\")   Wt 73 kg (161 lb)   SpO2 99%   BMI 23.78 kg/m²   Family History   Family history unknown: Yes       Current Outpatient Medications:   •  aspirin (aspirin) 81 MG EC tablet, Take 81 mg by mouth Daily., Disp: , Rfl:   •  fexofenadine (Allegra Allergy) 180 MG tablet, Take 1 tablet by mouth Daily., Disp: 90 tablet, Rfl: 3  •  " finasteride (PROSCAR) 5 MG tablet, Take 5 mg by mouth Daily., Disp: , Rfl:   •  fluticasone (Flonase) 50 MCG/ACT nasal spray, 2 sprays into the nostril(s) as directed by provider Daily., Disp: 18.2 mL, Rfl: 0  •  HYDROcodone-acetaminophen (NORCO) 7.5-325 MG per tablet, Take 1 tablet by mouth Every 8 (Eight) Hours As Needed., Disp: , Rfl:   •  metoprolol tartrate (LOPRESSOR) 25 MG tablet, Takes 1/2 tablet by mouth twice daily., Disp: 90 tablet, Rfl: 3  •  pravastatin (PRAVACHOL) 80 MG tablet, Take 1 tablet by mouth Every Night., Disp: 90 tablet, Rfl: 1  •  tamsulosin (FLOMAX) 0.4 MG capsule 24 hr capsule, TAKE 1 CAPSULE DAILY, Disp: 90 capsule, Rfl: 3  No Known Allergies  Social History     Socioeconomic History   • Marital status:    Tobacco Use   • Smoking status: Former Smoker     Packs/day: 1.00     Years: 50.00     Pack years: 50.00     Quit date:      Years since quittin.2   • Smokeless tobacco: Never Used   Vaping Use   • Vaping Use: Never used   Substance and Sexual Activity   • Alcohol use: No   • Drug use: Never   • Sexual activity: Defer     Review of Systems   Constitutional: Negative for fever and malaise/fatigue.   Cardiovascular: Negative for chest pain, dyspnea on exertion and palpitations.   Respiratory: Negative for cough and shortness of breath.    Skin: Negative for rash.   Gastrointestinal: Negative for abdominal pain, nausea and vomiting.   Neurological: Negative for focal weakness and headaches.   All other systems reviewed and are negative.             Objective:     Constitutional:       Appearance: Well-developed.   Eyes:      General: No scleral icterus.     Conjunctiva/sclera: Conjunctivae normal.   HENT:      Head: Normocephalic and atraumatic.   Neck:      Vascular: No carotid bruit or JVD.   Pulmonary:      Effort: Pulmonary effort is normal.      Breath sounds: Normal breath sounds. No wheezing. No rales.   Cardiovascular:      Normal rate. Regular rhythm.   Pulses:      Intact distal pulses.   Abdominal:      General: Bowel sounds are normal.      Palpations: Abdomen is soft.   Musculoskeletal:      Cervical back: Normal range of motion and neck supple. Skin:     General: Skin is warm and dry.      Findings: No rash.   Neurological:      Mental Status: Alert.       Procedures    Lab Review:         MDM  1.  Coronary disease  Patient has nonobstructive disease now with normally systolic function is currently stable on medications  2.  Hypertension  Patient blood pressure currently stable on medications including metoprolol  3.  Hyperlipidemia  Patient is on pravastatin the lipid levels are well within normal limits  4.  Abdominal aortic aneurysm  Patient has history of abdominal aortic aneurysm and is followed by the vascular surgeons      Patient's previous medical records, labs, and EKG were reviewed and discussed with the patient at today's visit.

## 2022-06-30 ENCOUNTER — OFFICE VISIT (OUTPATIENT)
Dept: FAMILY MEDICINE CLINIC | Facility: CLINIC | Age: 82
End: 2022-06-30

## 2022-06-30 VITALS
WEIGHT: 160 LBS | TEMPERATURE: 97.1 F | BODY MASS INDEX: 23.7 KG/M2 | HEART RATE: 74 BPM | HEIGHT: 69 IN | SYSTOLIC BLOOD PRESSURE: 108 MMHG | DIASTOLIC BLOOD PRESSURE: 62 MMHG | OXYGEN SATURATION: 100 %

## 2022-06-30 DIAGNOSIS — M54.16 LUMBAR RADICULOPATHY: Primary | ICD-10-CM

## 2022-06-30 PROCEDURE — 99213 OFFICE O/P EST LOW 20 MIN: CPT | Performed by: NURSE PRACTITIONER

## 2022-06-30 RX ORDER — HYDROCODONE BITARTRATE AND ACETAMINOPHEN 10; 325 MG/1; MG/1
TABLET ORAL
COMMUNITY
Start: 2022-05-10 | End: 2022-06-30 | Stop reason: SDUPTHER

## 2022-06-30 RX ORDER — HYDROCODONE BITARTRATE AND ACETAMINOPHEN 10; 325 MG/1; MG/1
1 TABLET ORAL EVERY 12 HOURS
Qty: 60 TABLET | Refills: 0 | Status: SHIPPED | OUTPATIENT
Start: 2022-06-30 | End: 2022-08-25 | Stop reason: SDUPTHER

## 2022-06-30 NOTE — PROGRESS NOTES
Chief Complaint  Back Pain    Subjective          Yordy Hebert presents to [facility] MED[facility] INTERNAL MEDICINE      History of Present Illness    Yordy is an 81-year-old male patient of Dr. Garima Jain who presents today with complaints of back pain.    He tells me he is here today for referral to a pain management center that is closer.  He reports the last time he saw Dr. Painting he advised that he could refer to the [facility] pain management [facility].  Patient sees pain management for a back surgery he had about 6 years ago.  Additionally, he is with lumbar radiculopathy. He currently sees a pain management provider in [place] - Dr. Alphonse Haas. He is now up to 10 mg twice daily.  He does report on most days he only takes 1 tablet therefore prescription lasts him around 2 months.    Part of the reason that he wants to have a closer provider is that he just found out his wife has colon cancer.  He reports she has been in the hospital at [facility] for 12 days and was just released home yesterday.  He indicates that the surgeon took part of her colon out. Home health has been set up for her and patient would like to be nearby and available for her care.    Inform me that he had about a week left of medication left.  He did bring that in and had 7 tablets remaining.  I am going to go ahead and place a referral to [facility].  Patient is hopeful to get an appointment in a week however, I advised him that this would be unlikely.  In the meantime, I am going to fill his prescription for hydrocodone as I believe he is not a high risk patient regarding this medication.  I instructed him to let us know if the appointment is going to be more than 2 months away.  He advised he would do so.    Additionally he would like to do some lab work today however, it is not white been 1 year since his PSA was drawn.  He does have an appointment coming up with Dr. Painting on August 16.  I recommended him  "wait for that appointment to get his physical, labs, chronic conditions assessed.  He does report that he is going to get an appointment for his wife as well because she is leaving Dr. Farr as he is retiring.        Objective     Vital Signs:   /62 (BP Location: Left arm, Patient Position: Sitting, Cuff Size: Adult)   Pulse 74   Temp 97.1 °F (36.2 °C) (Infrared)   Ht 175.3 cm (69.02\")   Wt 72.6 kg (160 lb)   SpO2 100%   BMI 23.62 kg/m²           Physical Exam  Vitals reviewed.   Constitutional:       Appearance: He is well-developed.      Comments: Wearing a face mask     HENT:      Head: Normocephalic and atraumatic.   Eyes:      Conjunctiva/sclera: Conjunctivae normal.   Cardiovascular:      Rate and Rhythm: Normal rate and regular rhythm.      Pulses: Normal pulses.      Heart sounds: Normal heart sounds.   Pulmonary:      Effort: Pulmonary effort is normal. No respiratory distress.      Breath sounds: Normal breath sounds.   Musculoskeletal:         General: Normal range of motion.      Cervical back: Normal range of motion.   Skin:     General: Skin is warm and dry.      Findings: No rash.   Neurological:      Mental Status: He is alert and oriented to person, place, and time.   Psychiatric:         Behavior: Behavior normal.                Result Review :                                   Assessment and Plan      Diagnoses and all orders for this visit:    1. Lumbar radiculopathy (Primary)  Assessment & Plan:  Refilling patient's hydrocodone so that he does not have to continue going to [place] for pain management until we can get him into [facility].    Orders:  -     HYDROcodone-acetaminophen (NORCO)  MG per tablet; Take 1 tablet by mouth Every 12 (Twelve) Hours.  Dispense: 60 tablet; Refill: 0          Follow Up       No follow-ups on file.      Patient was given instructions and counseling regarding his condition or for health maintenance advice. Please see specific " information pulled into the AVS if appropriate.     Helga Dover, APRN6/30/202212:01 EDT  This note has been electronically signed

## 2022-06-30 NOTE — ASSESSMENT & PLAN NOTE
Refilling patient's hydrocodone so that he does not have to continue going to [place] for pain management until we can get him into [facility].

## 2022-08-02 RX ORDER — PRAVASTATIN SODIUM 80 MG/1
80 TABLET ORAL NIGHTLY
Qty: 90 TABLET | Refills: 1 | Status: SHIPPED | OUTPATIENT
Start: 2022-08-02 | End: 2022-11-02 | Stop reason: SDUPTHER

## 2022-08-25 DIAGNOSIS — M54.16 LUMBAR RADICULOPATHY: ICD-10-CM

## 2022-08-26 RX ORDER — HYDROCODONE BITARTRATE AND ACETAMINOPHEN 10; 325 MG/1; MG/1
1 TABLET ORAL EVERY 12 HOURS
Qty: 60 TABLET | Refills: 0 | Status: SHIPPED | OUTPATIENT
Start: 2022-08-26 | End: 2022-09-21

## 2022-09-13 ENCOUNTER — OFFICE VISIT (OUTPATIENT)
Dept: CARDIOLOGY | Facility: CLINIC | Age: 82
End: 2022-09-13

## 2022-09-13 VITALS
HEIGHT: 69 IN | BODY MASS INDEX: 23.7 KG/M2 | SYSTOLIC BLOOD PRESSURE: 100 MMHG | DIASTOLIC BLOOD PRESSURE: 55 MMHG | OXYGEN SATURATION: 96 % | WEIGHT: 160 LBS | HEART RATE: 66 BPM

## 2022-09-13 DIAGNOSIS — I10 ESSENTIAL HYPERTENSION: ICD-10-CM

## 2022-09-13 DIAGNOSIS — E78.00 PURE HYPERCHOLESTEROLEMIA: ICD-10-CM

## 2022-09-13 DIAGNOSIS — I25.10 CHRONIC CORONARY ARTERY DISEASE: Primary | ICD-10-CM

## 2022-09-13 PROCEDURE — 99213 OFFICE O/P EST LOW 20 MIN: CPT | Performed by: INTERNAL MEDICINE

## 2022-09-13 NOTE — PROGRESS NOTES
"    Subjective:     Encounter Date:09/13/2022      Patient ID: Yordy Hebert is a 81 y.o. male.    Chief Complaint:  History of Present Illness 81-year-old white male with history of coronary disease hypertension hyperlipidemia presents to office for follow-up.  Patient is currently stable without any symptoms of chest pain or shortness of breath at rest or exertion.  No complains any PND orthopnea.  No palpitation dizziness syncope or swelling of the feet.  Patient has been taking all medicines regularly.  Patient does not smoke.  Patient is trying exercise regularly.  Follows a good diet.    The following portions of the patient's history were reviewed and updated as appropriate: allergies, current medications, past family history, past medical history, past social history, past surgical history and problem list.  Past Medical History:   Diagnosis Date   • Coronary artery disease    • DJD (degenerative joint disease)     (L) hip   • Family history of abdominal aortic aneurysm (AAA) repair 1/31/2012   • Foot pain, left 4/13/2017   • History of AAA (abdominal aortic aneurysm) repair    • Hyperlipidemia    • Hypertension    • Inguinal hernia     right   • Prostatitis      Past Surgical History:   Procedure Laterality Date   • ABDOMINAL AORTIC ANEURYSM REPAIR     • BACK SURGERY  11/17/2016   • CARDIAC CATHETERIZATION     • CATARACT EXTRACTION, BILATERAL     • CORONARY ANGIOPLASTY WITH STENT PLACEMENT     • INGUINAL HERNIA REPAIR     • RENAL ARTERY STENT Left 06/11/2008     /55 (BP Location: Left arm, Patient Position: Sitting, Cuff Size: Adult)   Pulse 66   Ht 175.3 cm (69.02\")   Wt 72.6 kg (160 lb)   SpO2 96%   BMI 23.61 kg/m²   Family History   Family history unknown: Yes       Current Outpatient Medications:   •  aspirin (aspirin) 81 MG EC tablet, Take 81 mg by mouth Daily., Disp: , Rfl:   •  fexofenadine (Allegra Allergy) 180 MG tablet, Take 1 tablet by mouth Daily., Disp: 90 tablet, Rfl: 3  •  " finasteride (PROSCAR) 5 MG tablet, Take 5 mg by mouth Daily., Disp: , Rfl:   •  fluticasone (Flonase) 50 MCG/ACT nasal spray, 2 sprays into the nostril(s) as directed by provider Daily., Disp: 18.2 mL, Rfl: 0  •  HYDROcodone-acetaminophen (NORCO)  MG per tablet, Take 1 tablet by mouth Every 12 (Twelve) Hours., Disp: 60 tablet, Rfl: 0  •  metoprolol tartrate (LOPRESSOR) 25 MG tablet, Takes 1/2 tablet by mouth twice daily., Disp: 90 tablet, Rfl: 3  •  pravastatin (PRAVACHOL) 80 MG tablet, Take 1 tablet by mouth Every Night., Disp: 90 tablet, Rfl: 1  •  tamsulosin (FLOMAX) 0.4 MG capsule 24 hr capsule, TAKE 1 CAPSULE DAILY, Disp: 90 capsule, Rfl: 3  No Known Allergies  Social History     Socioeconomic History   • Marital status:    Tobacco Use   • Smoking status: Former Smoker     Packs/day: 1.00     Years: 50.00     Pack years: 50.00     Quit date:      Years since quittin.7   • Smokeless tobacco: Never Used   Vaping Use   • Vaping Use: Never used   Substance and Sexual Activity   • Alcohol use: No   • Drug use: Never   • Sexual activity: Defer     Review of Systems   Constitutional: Negative for fever and malaise/fatigue.   Cardiovascular: Negative for chest pain, dyspnea on exertion and palpitations.   Respiratory: Negative for cough and shortness of breath.    Skin: Negative for rash.   Gastrointestinal: Negative for abdominal pain, nausea and vomiting.   Neurological: Negative for focal weakness and headaches.   All other systems reviewed and are negative.             Objective:     Constitutional:       Appearance: Well-developed.   Eyes:      General: No scleral icterus.     Conjunctiva/sclera: Conjunctivae normal.   HENT:      Head: Normocephalic and atraumatic.   Neck:      Vascular: No carotid bruit or JVD.   Pulmonary:      Effort: Pulmonary effort is normal.      Breath sounds: Normal breath sounds. No wheezing. No rales.   Cardiovascular:      Normal rate. Regular rhythm.   Pulses:      Intact distal pulses.   Abdominal:      General: Bowel sounds are normal.      Palpations: Abdomen is soft.   Musculoskeletal:      Cervical back: Normal range of motion and neck supple. Skin:     General: Skin is warm and dry.      Findings: No rash.   Neurological:      Mental Status: Alert.       Procedures    Lab Review:         MDM #1 coronary disease  Patient has nonobstructive disease and is currently stable on medications with normal function  2.  Hypertension  Patient blood pressure currently stable on medications including metoprolol  3.  Hyperlipidemia  Patient is on pravastatin the lipid levels are well within normal limits.      Patient's previous medical records, labs, and EKG were reviewed and discussed with the patient at today's visit.

## 2022-09-21 ENCOUNTER — OFFICE VISIT (OUTPATIENT)
Dept: FAMILY MEDICINE CLINIC | Facility: CLINIC | Age: 82
End: 2022-09-21

## 2022-09-21 VITALS
BODY MASS INDEX: 22.75 KG/M2 | SYSTOLIC BLOOD PRESSURE: 140 MMHG | DIASTOLIC BLOOD PRESSURE: 78 MMHG | RESPIRATION RATE: 18 BRPM | HEART RATE: 60 BPM | WEIGHT: 153.6 LBS | HEIGHT: 69 IN | OXYGEN SATURATION: 98 % | TEMPERATURE: 97.1 F

## 2022-09-21 DIAGNOSIS — M54.42 CHRONIC MIDLINE LOW BACK PAIN WITH BILATERAL SCIATICA: ICD-10-CM

## 2022-09-21 DIAGNOSIS — M54.41 CHRONIC MIDLINE LOW BACK PAIN WITH BILATERAL SCIATICA: ICD-10-CM

## 2022-09-21 DIAGNOSIS — N13.8 BENIGN PROSTATIC HYPERPLASIA WITH URINARY OBSTRUCTION: ICD-10-CM

## 2022-09-21 DIAGNOSIS — G89.29 CHRONIC MIDLINE LOW BACK PAIN WITH BILATERAL SCIATICA: ICD-10-CM

## 2022-09-21 DIAGNOSIS — I25.10 CHRONIC CORONARY ARTERY DISEASE: ICD-10-CM

## 2022-09-21 DIAGNOSIS — I10 ESSENTIAL HYPERTENSION: Primary | ICD-10-CM

## 2022-09-21 DIAGNOSIS — N40.1 BENIGN PROSTATIC HYPERPLASIA WITH URINARY OBSTRUCTION: ICD-10-CM

## 2022-09-21 DIAGNOSIS — E78.2 MIXED HYPERLIPIDEMIA: ICD-10-CM

## 2022-09-21 DIAGNOSIS — Z12.5 ENCOUNTER FOR PROSTATE CANCER SCREENING: ICD-10-CM

## 2022-09-21 DIAGNOSIS — M54.16 LUMBAR RADICULOPATHY: ICD-10-CM

## 2022-09-21 PROCEDURE — 99214 OFFICE O/P EST MOD 30 MIN: CPT | Performed by: FAMILY MEDICINE

## 2022-09-21 NOTE — PROGRESS NOTES
Subjective   Yordy Hebert is a 81 y.o. male.   Chief Complaint   Patient presents with   • Hypertension   • Hyperlipidemia   • Benign Prostatic Hypertrophy   • Hyperglycemia       History of Present Illness   Presents to the office today for schedule says is follow-up per Helga.  I last saw him back in late July of 2021.    Looks like Helga saw him on June 30 for back pain.  He has been following with Dr. Haas in [place] for quite some time.  Apparently he was taking hydrocodone 10 mg tablets twice a day, but oftentimes he really only had to take it once a day.  He requested Helga make referral to somewhere closer as his wife has been diagnosed with colon cancer recently and it is difficult for him to make it all the way to [place].  She filled the prescription for him in June and again in late August.  He has not had lab work done in over a year and he would like to do that while he is here today.    Condition causing chronic pain in his lumbar degenerative disc disease with radiculopathy.  He had a lumbar MRI in 2018 that I have available.  There is evidence of previous surgery at L4-5.  He also has a lot of facet arthropathy, endplate spurring, neuroforaminal narrowing at L4-5 as well as severe left neuroforaminal narrowing at L2, L3 as well.  He is able to tolerate all of this pretty well and usually the 1 hydrocodone at bedtime allows him to live comfortably and functionally.    He had COVID in August and is only 80% better.  Still gets a little short of breath from time to time.    BPH with symptoms of urinary outlet obstruction.  He sees Dr. Perales in early November.  He likes to get a PSA checked every year.  We have continue to follow that per his choice.  He continues on finasteride and Flomax.    HTN- blood pressure today is perfectly acceptable for him at 140/78.  He is tolerating Lopressor without problems.    Chronic coronary artery disease-currently asymptomatic.  No chest pains.  Had  a heart attack in 2014.    HLD- last lipid panel was around a year ago.  He continues on high-dose pravastatin and is tolerating that without myalgias.    Patient Active Problem List    Diagnosis Date Noted   • Left shoulder pain 01/08/2020   • Left rotator cuff tear arthropathy 01/08/2020   • Abdominal aortic aneurysm (HCC) 11/01/2017     Note Last Updated: 1/30/2020     Had AAA repaired in '08 with a sleeve - repeat CT in 9/19 - no evidence of any leaking.      • Presence of cardiac and vascular implant and graft 11/01/2017   • Peroneal tendinitis 04/13/2017   • Lumbar radiculopathy 06/02/2015     Note Last Updated: 11/13/2019     Had series of injections in his back in 2016.  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   • Impaired fasting glucose 03/03/2015   • Essential hypertension 09/30/2014   • Chronic coronary artery disease 09/23/2014     Note Last Updated: 11/13/2019     Had MI on 2014 - hed emergent cath - had 2 stents     • S/P AAA repair 08/24/2014   • Carotid bruit 09/24/2013   • Hay fever 03/22/2013   • Inguinal hernia, right 04/27/2012   • Arteriosclerotic vascular disease 01/31/2012   • Benign prostatic hyperplasia with urinary obstruction 01/31/2012   • Degenerative joint disease 01/31/2012   • Inflammatory disease of prostate 01/31/2012   • Systolic murmur 01/31/2012   • Mixed hyperlipidemia 12/20/2011           Past Surgical History:   Procedure Laterality Date   • ABDOMINAL AORTIC ANEURYSM REPAIR     • BACK SURGERY  11/17/2016   • CARDIAC CATHETERIZATION     • CATARACT EXTRACTION, BILATERAL     • CORONARY ANGIOPLASTY WITH STENT PLACEMENT     • INGUINAL HERNIA REPAIR     • RENAL ARTERY STENT Left 06/11/2008     Current Outpatient Medications on File Prior to Visit   Medication Sig   • aspirin (aspirin) 81 MG EC tablet Take 81 mg by mouth Daily.   • fexofenadine (Allegra Allergy) 180 MG tablet Take 1 tablet by mouth Daily. (Patient taking  "differently: Take 180 mg by mouth As Needed.)   • finasteride (PROSCAR) 5 MG tablet Take 5 mg by mouth Daily.   • fluticasone (Flonase) 50 MCG/ACT nasal spray 2 sprays into the nostril(s) as directed by provider Daily.   • metoprolol tartrate (LOPRESSOR) 25 MG tablet Takes 1/2 tablet by mouth twice daily.   • pravastatin (PRAVACHOL) 80 MG tablet Take 1 tablet by mouth Every Night.   • tamsulosin (FLOMAX) 0.4 MG capsule 24 hr capsule TAKE 1 CAPSULE DAILY   • [DISCONTINUED] HYDROcodone-acetaminophen (NORCO)  MG per tablet Take 1 tablet by mouth Every 12 (Twelve) Hours.     No current facility-administered medications on file prior to visit.     No Known Allergies  Social History     Socioeconomic History   • Marital status:    Tobacco Use   • Smoking status: Current Every Day Smoker     Packs/day: 0.25     Years: 62.00     Pack years: 15.50     Types: Cigarettes     Start date: 1959   • Smokeless tobacco: Never Used   Vaping Use   • Vaping Use: Never used   Substance and Sexual Activity   • Alcohol use: No   • Drug use: Never   • Sexual activity: Not Currently     Family History   Family history unknown: Yes       Review of Systems    Objective   /78 (BP Location: Left arm, Patient Position: Sitting, Cuff Size: Adult)   Pulse 60   Temp 97.1 °F (36.2 °C) (Infrared)   Resp 18   Ht 175.3 cm (69.02\")   Wt 69.7 kg (153 lb 9.6 oz)   SpO2 98%   BMI 22.67 kg/m²   Physical Exam  Constitutional:       Appearance: He is well-developed. He is not toxic-appearing.      Comments: Wearing a face mask  Robust elderly white male.  Tall, thin   HENT:      Head: Normocephalic and atraumatic.   Eyes:      Conjunctiva/sclera: Conjunctivae normal.   Cardiovascular:      Rate and Rhythm: Normal rate and regular rhythm.      Heart sounds: No murmur heard.  Pulmonary:      Effort: Pulmonary effort is normal. No respiratory distress.      Breath sounds: Normal breath sounds.   Musculoskeletal:         General: Normal " range of motion.      Cervical back: Normal range of motion.      Right lower leg: No edema.      Left lower leg: No edema.   Skin:     General: Skin is warm and dry.      Findings: No rash.   Neurological:      Mental Status: He is alert and oriented to person, place, and time. Mental status is at baseline.      Gait: Gait abnormal (slightly stooped, antalgic gait).   Psychiatric:         Behavior: Behavior normal.           No visits with results within 4 Month(s) from this visit.   Latest known visit with results is:   Results Encounter on 07/23/2021   Component Date Value Ref Range Status   • PSA 07/27/2021 0.3  0.0 - 4.0 ng/mL Final    Comment: Roche ECLIA methodology.  According to the American [employer] Association, Serum PSA should  decrease and remain at undetectable levels after radical  prostatectomy. The AUA defines biochemical recurrence as an initial  PSA value 0.2 ng/mL or greater followed by a subsequent confirmatory  PSA value 0.2 ng/mL or greater.  Values obtained with different assay methods or kits cannot be used  interchangeably. Results cannot be interpreted as absolute evidence  of the presence or absence of malignant disease.           Assessment & Plan   Diagnoses and all orders for this visit:    1. Essential hypertension (Primary)  -     Comprehensive Metabolic Panel  -     CBC & Differential    2. Chronic coronary artery disease    3. Mixed hyperlipidemia  -     Lipid Panel    4. Benign prostatic hyperplasia with urinary obstruction    5. Encounter for prostate cancer screening  -     PSA Screen    Status of multiple chronic medical problems reviewed today.  Hypertension is a chronic problem currently at goal.  Continue beta-blocker at current dose.  I am sure the Flomax also contribute somewhat to his blood pressure control.  He is tolerating these medicines without problems.  Keep follow-up with urology in November per that plan.  I recommend he continue the finasteride and Flomax at  the present time.  We will get labs today to screen him for diabetes, follow renal function, screening for anemia and of course prostate cancer with PSA.  We will follow status of his hyperlipidemia with a lipid panel.  Continue pravastatin at 80 mg/day.  He does not need refills on any medications today.  I will follow-up with him when his labs are back and I would like to see him again in 4 months to follow-up on his chronic pain.  He takes a very modest amount of hydrocodone for chronic back pain.  He has already been through therapy, he has had injections multiple times.  I do not see a need for him to go to another pain clinic in another town while he is dealing with his elderly wife's cancer just to get a prescription for 1 hydrocodone per day.  He appears to be low risk for diversion.        Call with any problems or concerns before next visit       Return in about 4 months (around 1/21/2023), or follow up on chronic pain.      Much of this report is an electronic transcription of spoken language to printed text using Dragon dictation software.  As such, the subtleties and finesse of spoken language may permit erroneous, or at times, nonsensical words or phrases to be inadvertently transcribed; thus changes may be made at a later date to rectify these errors.     Garima Jain MD9/21/202212:16 EDT  This note has been electronically signed

## 2022-09-22 ENCOUNTER — TELEPHONE (OUTPATIENT)
Dept: FAMILY MEDICINE CLINIC | Facility: CLINIC | Age: 82
End: 2022-09-22

## 2022-09-22 LAB
ALBUMIN SERPL-MCNC: 4.5 G/DL (ref 3.6–4.6)
ALBUMIN/GLOB SERPL: 1.9 {RATIO} (ref 1.2–2.2)
ALP SERPL-CCNC: 93 IU/L (ref 44–121)
ALT SERPL-CCNC: 14 IU/L (ref 0–44)
AST SERPL-CCNC: 20 IU/L (ref 0–40)
BASOPHILS # BLD AUTO: 0 X10E3/UL (ref 0–0.2)
BASOPHILS NFR BLD AUTO: 1 %
BILIRUB SERPL-MCNC: 0.8 MG/DL (ref 0–1.2)
BUN SERPL-MCNC: 14 MG/DL (ref 8–27)
BUN/CREAT SERPL: 17 (ref 10–24)
CALCIUM SERPL-MCNC: 9.8 MG/DL (ref 8.6–10.2)
CHLORIDE SERPL-SCNC: 100 MMOL/L (ref 96–106)
CHOLEST SERPL-MCNC: 150 MG/DL (ref 100–199)
CO2 SERPL-SCNC: 23 MMOL/L (ref 20–29)
CREAT SERPL-MCNC: 0.82 MG/DL (ref 0.76–1.27)
EGFRCR SERPLBLD CKD-EPI 2021: 88 ML/MIN/1.73
EOSINOPHIL # BLD AUTO: 0.3 X10E3/UL (ref 0–0.4)
EOSINOPHIL NFR BLD AUTO: 6 %
ERYTHROCYTE [DISTWIDTH] IN BLOOD BY AUTOMATED COUNT: 12.9 % (ref 11.6–15.4)
GLOBULIN SER CALC-MCNC: 2.4 G/DL (ref 1.5–4.5)
GLUCOSE SERPL-MCNC: 102 MG/DL (ref 65–99)
HCT VFR BLD AUTO: 39.8 % (ref 37.5–51)
HDLC SERPL-MCNC: 54 MG/DL
HGB BLD-MCNC: 13 G/DL (ref 13–17.7)
IMM GRANULOCYTES # BLD AUTO: 0 X10E3/UL (ref 0–0.1)
IMM GRANULOCYTES NFR BLD AUTO: 0 %
LDLC SERPL CALC-MCNC: 79 MG/DL (ref 0–99)
LYMPHOCYTES # BLD AUTO: 0.7 X10E3/UL (ref 0.7–3.1)
LYMPHOCYTES NFR BLD AUTO: 17 %
MCH RBC QN AUTO: 31.3 PG (ref 26.6–33)
MCHC RBC AUTO-ENTMCNC: 32.7 G/DL (ref 31.5–35.7)
MCV RBC AUTO: 96 FL (ref 79–97)
MONOCYTES # BLD AUTO: 0.5 X10E3/UL (ref 0.1–0.9)
MONOCYTES NFR BLD AUTO: 12 %
NEUTROPHILS # BLD AUTO: 2.8 X10E3/UL (ref 1.4–7)
NEUTROPHILS NFR BLD AUTO: 64 %
PLATELET # BLD AUTO: 151 X10E3/UL (ref 150–450)
POTASSIUM SERPL-SCNC: 4.6 MMOL/L (ref 3.5–5.2)
PROT SERPL-MCNC: 6.9 G/DL (ref 6–8.5)
PSA SERPL-MCNC: 0.2 NG/ML (ref 0–4)
RBC # BLD AUTO: 4.15 X10E6/UL (ref 4.14–5.8)
SODIUM SERPL-SCNC: 139 MMOL/L (ref 134–144)
TRIGL SERPL-MCNC: 92 MG/DL (ref 0–149)
VLDLC SERPL CALC-MCNC: 17 MG/DL (ref 5–40)
WBC # BLD AUTO: 4.2 X10E3/UL (ref 3.4–10.8)

## 2022-09-22 NOTE — TELEPHONE ENCOUNTER
HUB TO READ     LEFT MESSAGE TO RETURN CALL     Please tell Yordy that his blood work from yesterday all looked wonderful.  His PSA is great at 0.2, he is not anemic, total cholesterol is 150, LDL is 79-both of those numbers are excellent.  Blood sugar is fine at 102.  His kidney and liver function tests are normal.  No changes based on these labs.  Keep up the good work.  If he has any questions, please let me know

## 2022-10-05 ENCOUNTER — FLU SHOT (OUTPATIENT)
Dept: FAMILY MEDICINE CLINIC | Facility: CLINIC | Age: 82
End: 2022-10-05

## 2022-10-05 DIAGNOSIS — Z23 NEED FOR INFLUENZA VACCINATION: Primary | ICD-10-CM

## 2022-10-05 PROCEDURE — G0008 ADMIN INFLUENZA VIRUS VAC: HCPCS | Performed by: NURSE PRACTITIONER

## 2022-10-05 PROCEDURE — 90662 IIV NO PRSV INCREASED AG IM: CPT | Performed by: NURSE PRACTITIONER

## 2022-10-24 ENCOUNTER — OFFICE VISIT (OUTPATIENT)
Dept: FAMILY MEDICINE CLINIC | Facility: CLINIC | Age: 82
End: 2022-10-24

## 2022-10-24 VITALS
OXYGEN SATURATION: 97 % | HEIGHT: 69 IN | DIASTOLIC BLOOD PRESSURE: 64 MMHG | HEART RATE: 67 BPM | RESPIRATION RATE: 18 BRPM | WEIGHT: 156.8 LBS | TEMPERATURE: 97.1 F | SYSTOLIC BLOOD PRESSURE: 110 MMHG | BODY MASS INDEX: 23.22 KG/M2

## 2022-10-24 DIAGNOSIS — M54.16 LUMBAR RADICULOPATHY: Primary | ICD-10-CM

## 2022-10-24 DIAGNOSIS — I79.0 ANEURYSM OF AORTA IN DISEASES CLASSIFIED ELSEWHERE: ICD-10-CM

## 2022-10-24 DIAGNOSIS — Z86.79 S/P AAA REPAIR: ICD-10-CM

## 2022-10-24 DIAGNOSIS — I73.9 CLAUDICATION, INTERMITTENT: ICD-10-CM

## 2022-10-24 DIAGNOSIS — Z98.890 S/P AAA REPAIR: ICD-10-CM

## 2022-10-24 PROCEDURE — 99214 OFFICE O/P EST MOD 30 MIN: CPT | Performed by: FAMILY MEDICINE

## 2022-10-24 RX ORDER — HYDROCODONE BITARTRATE AND ACETAMINOPHEN 10; 325 MG/1; MG/1
1 TABLET ORAL EVERY 12 HOURS PRN
COMMUNITY
Start: 2022-10-07

## 2022-10-24 RX ORDER — GABAPENTIN 100 MG/1
100 CAPSULE ORAL 3 TIMES DAILY
Qty: 90 CAPSULE | Refills: 1 | Status: SHIPPED | OUTPATIENT
Start: 2022-10-24

## 2022-10-24 NOTE — PROGRESS NOTES
Subjective   Yordy Hebert is a 81 y.o. male.   Chief Complaint   Patient presents with   • Leg Pain       History of Present Illness   81-year-old white male with a history of chronic lumbar radiculopathy following back surgery 6 years ago.  He reports that he is having pain in the back of his lower legs after he walks.  If he rests it goes away for the most part.  Cannot really tell me if it only happens when walking uphill.  Legs are not red, swollen.  Does not interfere with the sleep.  He has lots of vascular problems including hypertension, coronary artery disease, known iliac artery aneurysm and a history of an abdominal aortic aneurysm that was treated with a stent.  He had aortic angiogram with runoff 3 years ago and it did not show any significant distal arterial sclerosis.      Patient Active Problem List    Diagnosis Date Noted   • Chronic midline low back pain with bilateral sciatica 09/21/2022   • Left shoulder pain 01/08/2020   • Left rotator cuff tear arthropathy 01/08/2020   • Abdominal aortic aneurysm 11/01/2017     Note Last Updated: 1/30/2020     Had AAA repaired in '08 with a sleeve - repeat CT in 9/19 - no evidence of any leaking.      • Presence of cardiac and vascular implant and graft 11/01/2017   • Peroneal tendinitis 04/13/2017   • Lumbar radiculopathy 06/02/2015     Note Last Updated: 11/13/2019     Had series of injections in his back in 2016.  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   • Impaired fasting glucose 03/03/2015   • Essential hypertension 09/30/2014   • Chronic coronary artery disease 09/23/2014     Note Last Updated: 11/13/2019     Had MI on 2014 - hed emergent cath - had 2 stents     • S/P AAA repair 08/24/2014   • Carotid bruit 09/24/2013   • Hay fever 03/22/2013   • Inguinal hernia, right 04/27/2012   • Arteriosclerotic vascular disease 01/31/2012   • Benign prostatic hyperplasia with urinary obstruction  01/31/2012   • Degenerative joint disease 01/31/2012   • Inflammatory disease of prostate 01/31/2012   • Systolic murmur 01/31/2012   • Mixed hyperlipidemia 12/20/2011           Past Surgical History:   Procedure Laterality Date   • ABDOMINAL AORTIC ANEURYSM REPAIR     • BACK SURGERY  11/17/2016   • CARDIAC CATHETERIZATION     • CATARACT EXTRACTION, BILATERAL     • CORONARY ANGIOPLASTY WITH STENT PLACEMENT     • INGUINAL HERNIA REPAIR     • RENAL ARTERY STENT Left 06/11/2008     Current Outpatient Medications on File Prior to Visit   Medication Sig   • aspirin (aspirin) 81 MG EC tablet Take 81 mg by mouth Daily.   • fexofenadine (Allegra Allergy) 180 MG tablet Take 1 tablet by mouth Daily. (Patient taking differently: Take 1 tablet by mouth As Needed.)   • finasteride (PROSCAR) 5 MG tablet Take 5 mg by mouth Daily.   • fluticasone (Flonase) 50 MCG/ACT nasal spray 2 sprays into the nostril(s) as directed by provider Daily.   • HYDROcodone-acetaminophen (NORCO)  MG per tablet Take 1 tablet by mouth Every 12 (Twelve) Hours As Needed. for pain   • metoprolol tartrate (LOPRESSOR) 25 MG tablet Takes 1/2 tablet by mouth twice daily.   • pravastatin (PRAVACHOL) 80 MG tablet Take 1 tablet by mouth Every Night.   • tamsulosin (FLOMAX) 0.4 MG capsule 24 hr capsule TAKE 1 CAPSULE DAILY     No current facility-administered medications on file prior to visit.     No Known Allergies  Social History     Socioeconomic History   • Marital status:    Tobacco Use   • Smoking status: Every Day     Packs/day: 0.25     Years: 62.00     Pack years: 15.50     Types: Cigarettes     Start date: 1959     Passive exposure: Current   • Smokeless tobacco: Never   Vaping Use   • Vaping Use: Never used   Substance and Sexual Activity   • Alcohol use: No   • Drug use: Never   • Sexual activity: Not Currently     Family History   Family history unknown: Yes       Review of Systems    Objective   /64 (BP Location: Left arm, Patient  "Position: Sitting, Cuff Size: Adult)   Pulse 67   Temp 97.1 °F (36.2 °C) (Infrared)   Resp 18   Ht 175.3 cm (69.02\")   Wt 71.1 kg (156 lb 12.8 oz)   SpO2 97%   BMI 23.14 kg/m²   Physical Exam  Constitutional:       Appearance: He is well-developed.      Comments: Wearing a face mask  Elderly white male, looks younger than his known age of 81 years   HENT:      Head: Normocephalic and atraumatic.   Eyes:      Conjunctiva/sclera: Conjunctivae normal.   Cardiovascular:      Rate and Rhythm: Normal rate.   Pulmonary:      Effort: Pulmonary effort is normal.   Musculoskeletal:         General: Normal range of motion.      Cervical back: Normal range of motion.      Right lower leg: No edema.      Left lower leg: No edema.      Comments: Knee-high compression socks on.  No redness, no swelling of the lower legs.  No deep palpable cord.  Homans' sign is negative.   Skin:     General: Skin is warm and dry.      Findings: No rash.   Neurological:      Mental Status: He is alert and oriented to person, place, and time.      Gait: Gait abnormal (Slightly stooped, good arm swing).   Psychiatric:         Mood and Affect: Mood normal.         Behavior: Behavior normal.           Office Visit on 09/21/2022   Component Date Value Ref Range Status   • Total Cholesterol 09/21/2022 150  100 - 199 mg/dL Final   • Triglycerides 09/21/2022 92  0 - 149 mg/dL Final   • HDL Cholesterol 09/21/2022 54  >39 mg/dL Final   • VLDL Cholesterol Jv 09/21/2022 17  5 - 40 mg/dL Final   • LDL Chol Calc ([facility]) 09/21/2022 79  0 - 99 mg/dL Final   • PSA 09/21/2022 0.2  0.0 - 4.0 ng/mL Final    Comment: Roche ECLIA methodology.  According to the American Urological Association, Serum PSA should  decrease and remain at undetectable levels after radical  prostatectomy. The AUA defines biochemical recurrence as an initial  PSA value 0.2 ng/mL or greater followed by a subsequent confirmatory  PSA value 0.2 ng/mL or greater.  Values obtained with " different assay methods or kits cannot be used  interchangeably. Results cannot be interpreted as absolute evidence  of the presence or absence of malignant disease.     • Glucose 09/21/2022 102 (H)  65 - 99 mg/dL Final    Comment:                **Effective September 26, 2022 Glucose reference**                   interval will be changing to:                                                              70 - 99     • BUN 09/21/2022 14  8 - 27 mg/dL Final   • Creatinine 09/21/2022 0.82  0.76 - 1.27 mg/dL Final   • EGFR Result 09/21/2022 88  >59 mL/min/1.73 Final   • BUN/Creatinine Ratio 09/21/2022 17  10 - 24 Final   • Sodium 09/21/2022 139  134 - 144 mmol/L Final   • Potassium 09/21/2022 4.6  3.5 - 5.2 mmol/L Final   • Chloride 09/21/2022 100  96 - 106 mmol/L Final   • Total CO2 09/21/2022 23  20 - 29 mmol/L Final   • Calcium 09/21/2022 9.8  8.6 - 10.2 mg/dL Final   • Total Protein 09/21/2022 6.9  6.0 - 8.5 g/dL Final   • Albumin 09/21/2022 4.5  3.6 - 4.6 g/dL Final   • Globulin 09/21/2022 2.4  1.5 - 4.5 g/dL Final   • A/G Ratio 09/21/2022 1.9  1.2 - 2.2 Final   • Total Bilirubin 09/21/2022 0.8  0.0 - 1.2 mg/dL Final   • Alkaline Phosphatase 09/21/2022 93  44 - 121 IU/L Final   • AST (SGOT) 09/21/2022 20  0 - 40 IU/L Final   • ALT (SGPT) 09/21/2022 14  0 - 44 IU/L Final   • WBC 09/21/2022 4.2  3.4 - 10.8 x10E3/uL Final   • RBC 09/21/2022 4.15  4.14 - 5.80 x10E6/uL Final   • Hemoglobin 09/21/2022 13.0  13.0 - 17.7 g/dL Final   • Hematocrit 09/21/2022 39.8  37.5 - 51.0 % Final   • MCV 09/21/2022 96  79 - 97 fL Final   • MCH 09/21/2022 31.3  26.6 - 33.0 pg Final   • MCHC 09/21/2022 32.7  31.5 - 35.7 g/dL Final   • RDW 09/21/2022 12.9  11.6 - 15.4 % Final   • Platelets 09/21/2022 151  150 - 450 x10E3/uL Final   • Neutrophil Rel % 09/21/2022 64  Not Estab. % Final   • Lymphocyte Rel % 09/21/2022 17  Not Estab. % Final   • Monocyte Rel % 09/21/2022 12  Not Estab. % Final   • Eosinophil Rel % 09/21/2022 6  Not Estab. %  Final   • Basophil Rel % 09/21/2022 1  Not Estab. % Final   • Neutrophils Absolute 09/21/2022 2.8  1.4 - 7.0 x10E3/uL Final   • Lymphocytes Absolute 09/21/2022 0.7  0.7 - 3.1 x10E3/uL Final   • Monocytes Absolute 09/21/2022 0.5  0.1 - 0.9 x10E3/uL Final   • Eosinophils Absolute 09/21/2022 0.3  0.0 - 0.4 x10E3/uL Final   • Basophils Absolute 09/21/2022 0.0  0.0 - 0.2 x10E3/uL Final   • Immature Granulocyte Rel % 09/21/2022 0  Not Estab. % Final   • Immature Grans Absolute 09/21/2022 0.0  0.0 - 0.1 x10E3/uL Final         Assessment & Plan   Diagnoses and all orders for this visit:    1. Lumbar radiculopathy (Primary)  -     gabapentin (NEURONTIN) 100 MG capsule; Take 1 capsule by mouth 3 (Three) Times a Day.  Dispense: 90 capsule; Refill: 1    2. Claudication, intermittent (HCC)  -     Cancel: Doppler Arterial Multi Level Lower Extremity - Bilateral CAR; Future  -     CT Angio Abdominal Aorta Bilateral Iliofem Runoff With & Without Contrast; Future    3. S/P AAA repair  -     CT Angio Abdominal Aorta Bilateral Iliofem Runoff With & Without Contrast; Future    4. Aneurysm of aorta in diseases classified elsewhere (HCC)  -     CT Angio Abdominal Aorta Bilateral Iliofem Runoff With & Without Contrast; Future    Complicated situation.  His leg pain could be multifactorial.  Will do a trial with gabapentin 100 mg 3 times a day to see if it helps the pain.  I do sort of favor this being related to his low back.  His surgeon told him he would have problems in the future.  Because of his known vascular disease and known aortic aneurysm repair and iliac aneurysm, I think we should repeat his aortic angiogram with runoff.  He does not have a vascular surgeon anymore.  He had labs done last month.  Kidney function was normal.  Cholesterol was great.  He continues on pravastatin 80 mg/day.  I will follow-up with him when his angiogram is back.  I will see him as planned in January of next year and less need to see him sooner  because this problem.  He will let me know when a week or 2 if this gabapentin has been helpful.        Call with any problems or concerns before next visit       Return keep f/u in January as planned.      Much of this report is an electronic transcription of spoken language to printed text using Dragon dictation software.  As such, the subtleties and finesse of spoken language may permit erroneous, or at times, nonsensical words or phrases to be inadvertently transcribed; thus changes may be made at a later date to rectify these errors.     Garima Jain [id number] EDT  This note has been electronically signed

## 2022-10-31 ENCOUNTER — TELEPHONE (OUTPATIENT)
Dept: FAMILY MEDICINE CLINIC | Facility: CLINIC | Age: 82
End: 2022-10-31

## 2022-10-31 NOTE — TELEPHONE ENCOUNTER
Pt received a call concerning the CT of abdomen, but he is questioning if the Ultra Sound of his legs was ordered.  He said you mentioned you wanted to have both tests done at the same time.

## 2022-10-31 NOTE — TELEPHONE ENCOUNTER
He is called check on an Ultrasound because they called to schedule his ct and wants to do both test on same day.

## 2022-11-02 RX ORDER — PRAVASTATIN SODIUM 80 MG/1
80 TABLET ORAL NIGHTLY
Qty: 90 TABLET | Refills: 1 | Status: SHIPPED | OUTPATIENT
Start: 2022-11-02

## 2022-11-17 ENCOUNTER — HOSPITAL ENCOUNTER (OUTPATIENT)
Dept: CT IMAGING | Facility: HOSPITAL | Age: 82
Discharge: HOME | End: 2022-11-17
Admitting: FAMILY MEDICINE

## 2022-11-17 DIAGNOSIS — I73.9 CLAUDICATION, INTERMITTENT: ICD-10-CM

## 2022-11-17 DIAGNOSIS — Z98.890 S/P AAA REPAIR: ICD-10-CM

## 2022-11-17 DIAGNOSIS — I79.0 ANEURYSM OF AORTA IN DISEASES CLASSIFIED ELSEWHERE: ICD-10-CM

## 2022-11-17 DIAGNOSIS — Z86.79 S/P AAA REPAIR: ICD-10-CM

## 2022-11-17 LAB
CREAT BLDA-MCNC: 0.9 MG/DL (ref 0.6–1.3)
EGFRCR SERPLBLD CKD-EPI 2021: 85.3 ML/MIN/1.73

## 2022-11-17 PROCEDURE — 75635 CT ANGIO ABDOMINAL ARTERIES: CPT

## 2022-11-17 PROCEDURE — 0 IOPAMIDOL PER 1 ML: Performed by: FAMILY MEDICINE

## 2022-11-17 PROCEDURE — 82565 ASSAY OF CREATININE: CPT

## 2022-11-17 RX ADMIN — IOPAMIDOL 100 ML: 755 INJECTION, SOLUTION INTRAVENOUS at 12:59

## 2022-11-23 ENCOUNTER — TELEPHONE (OUTPATIENT)
Dept: FAMILY MEDICINE CLINIC | Facility: CLINIC | Age: 82
End: 2022-11-23

## 2022-11-23 DIAGNOSIS — I73.9 CLAUDICATION, INTERMITTENT: Primary | ICD-10-CM

## 2022-11-23 DIAGNOSIS — K82.8 THICKENING OF WALL OF GALLBLADDER WITH PERICHOLECYSTIC FLUID: ICD-10-CM

## 2022-11-23 DIAGNOSIS — I73.9 PERIPHERAL ARTERIAL DISEASE: ICD-10-CM

## 2022-11-23 NOTE — TELEPHONE ENCOUNTER
HUB TO READ     LEFT MESSAGE TO READ     Please give Yordy a call and tell him that the test we did of his circulation in his legs from last week showed there are a few areas of narrowing, particularly down lower in his legs, that may be limiting blood flow when he walks.   I would like him to see a new vascular surgeon about this to get their opinion as to whether or not this is significant.   Since we knew he already had circulation problems there may not be anything to do about this.  Pain in the back of the legs when walking is a sign of not enough circulation.  I put in a referral to our vascular surgery specialist down in [place], Dr. Verdugo.  He should be hearing from them probably early next week about an appointment.  Incidentally, there was some fluid around his gallbladder and I would like him to have a gallbladder ultrasound to further evaluate this.  I put in an order for the ultrasound to be done done at [facility].  I will follow-up with him when the results of that is back.  If he has any questions, please let me know.

## 2022-12-09 ENCOUNTER — HOSPITAL ENCOUNTER (OUTPATIENT)
Dept: ULTRASOUND IMAGING | Facility: HOSPITAL | Age: 82
Discharge: HOME | End: 2022-12-09
Admitting: FAMILY MEDICINE

## 2022-12-09 DIAGNOSIS — K82.8 THICKENING OF WALL OF GALLBLADDER WITH PERICHOLECYSTIC FLUID: ICD-10-CM

## 2022-12-09 PROCEDURE — 76705 ECHO EXAM OF ABDOMEN: CPT

## 2022-12-11 PROBLEM — K80.10 CALCULUS OF GALLBLADDER WITH CHRONIC CHOLECYSTITIS WITHOUT OBSTRUCTION: Status: ACTIVE | Noted: 2022-12-11

## 2022-12-12 ENCOUNTER — TELEPHONE (OUTPATIENT)
Dept: FAMILY MEDICINE CLINIC | Facility: CLINIC | Age: 82
End: 2022-12-12

## 2022-12-12 NOTE — TELEPHONE ENCOUNTER
HUB TO READ     WIFE ANSWERED BUT HUNG UP     Please give Yordy a call tomorrow and let him know that the ultrasound of his gallbladder did show multiple gallstones.  Gallbladder wall was also a little thick, so those gallstones may have been there a long time.   He does not have any evidence of acute infection of the gallbladder, so we do not need to do anything about this now, unless he begins to develop right upper abdominal pain, vomiting after eating for example.  If he has any questions, please let me know

## 2022-12-12 NOTE — TELEPHONE ENCOUNTER
Caller: Yordy Hebert    Relationship: Self    Best call back number: [phone]    HUB RELAYED     Please give Yordy a call tomorrow and let him know that the ultrasound of his gallbladder did show multiple gallstones.  Gallbladder wall was also a little thick, so those gallstones may have been there a long time.   He does not have any evidence of acute infection of the gallbladder, so we do not need to do anything about this now, unless he begins to develop right upper abdominal pain, vomiting after eating for example.  If he has any questions, please let me know    PATIENT HAS NO QUESTIONS AT THIS TIME AND WILL FOLLOW UP AT 1/25 OFFICE VISIT

## 2022-12-22 ENCOUNTER — APPOINTMENT (OUTPATIENT)
Dept: VASCULAR SURGERY | Facility: HOSPITAL | Age: 82
End: 2022-12-22

## 2023-01-17 ENCOUNTER — TRANSCRIBE ORDERS (OUTPATIENT)
Dept: ADMINISTRATIVE | Facility: HOSPITAL | Age: 83
End: 2023-01-17
Payer: MEDICARE

## 2023-01-17 ENCOUNTER — APPOINTMENT (OUTPATIENT)
Dept: VASCULAR SURGERY | Facility: HOSPITAL | Age: 83
End: 2023-01-17
Payer: MEDICARE

## 2023-01-17 DIAGNOSIS — I73.9 PERIPHERAL VASCULAR DISEASE, UNSPECIFIED: Primary | ICD-10-CM

## 2023-01-17 PROCEDURE — G0463 HOSPITAL OUTPT CLINIC VISIT: HCPCS

## 2023-02-14 ENCOUNTER — OFFICE VISIT (OUTPATIENT)
Dept: FAMILY MEDICINE CLINIC | Facility: CLINIC | Age: 83
End: 2023-02-14
Payer: MEDICARE

## 2023-02-14 VITALS
BODY MASS INDEX: 22.9 KG/M2 | TEMPERATURE: 97.1 F | DIASTOLIC BLOOD PRESSURE: 70 MMHG | WEIGHT: 154.6 LBS | RESPIRATION RATE: 16 BRPM | SYSTOLIC BLOOD PRESSURE: 138 MMHG | HEART RATE: 62 BPM | HEIGHT: 69 IN | OXYGEN SATURATION: 98 %

## 2023-02-14 DIAGNOSIS — I73.9 CLAUDICATION, INTERMITTENT: Primary | ICD-10-CM

## 2023-02-14 DIAGNOSIS — I73.9 PERIPHERAL ARTERIAL DISEASE: ICD-10-CM

## 2023-02-14 DIAGNOSIS — Z12.5 SCREENING FOR PROSTATE CANCER: ICD-10-CM

## 2023-02-14 DIAGNOSIS — R01.1 SYSTOLIC MURMUR: ICD-10-CM

## 2023-02-14 DIAGNOSIS — N13.8 BENIGN PROSTATIC HYPERPLASIA WITH URINARY OBSTRUCTION: ICD-10-CM

## 2023-02-14 DIAGNOSIS — I25.10 CHRONIC CORONARY ARTERY DISEASE: ICD-10-CM

## 2023-02-14 DIAGNOSIS — N40.1 BENIGN PROSTATIC HYPERPLASIA WITH URINARY OBSTRUCTION: ICD-10-CM

## 2023-02-14 DIAGNOSIS — E78.2 MIXED HYPERLIPIDEMIA: ICD-10-CM

## 2023-02-14 DIAGNOSIS — I10 ESSENTIAL HYPERTENSION: ICD-10-CM

## 2023-02-14 PROCEDURE — 99214 OFFICE O/P EST MOD 30 MIN: CPT | Performed by: FAMILY MEDICINE

## 2023-02-14 NOTE — PROGRESS NOTES
Subjective   Yordy Hebert is a 82 y.o. male.   Chief Complaint   Patient presents with   • Hypertension   • Hyperlipidemia   • Coronary Artery Disease   • Benign Prostatic Hypertrophy       History of Present Illness   82-year-old white male presents to the office today for routine follow-up on chronic medical problems per active problem list as below.  I last saw him in October 2022.  He has continued to follow-up with his urologist and pain management specialist.  Had shots in his back recently.  Takes 1 hydrocodone per day.  Makes it so he can at least walk around.     He had lab work done in September 2022 that included a lipid panel, CMP, PSA, CBC.    He was having some intermittent claudication symptoms.  I did a CT angiography of his lower extremities.    The CT suggested cholecystitis.  We did a gallbladder ultrasound which showed cholelithiasis with nonspecific gallbladder wall thickening.  I referred him to vascular surgery and it looks like he had Doppler ABIs as recently as mid January of this year.  They plan 6 month follow up with repeat ZARI's.  He denies any and all abdominal pain.  No loose stool.  No postprandial pain.    Has some nocturia, but that's it.  Follows with urology.  NO cancer.      HTN - B/P is good today at 138/70.  Tolerating Lopressor, pro scar, Flomax without low blood pressures.  Really his medicines are for other problems, but they combined to help keep his blood pressure under control.    Overall, he really has no complaints today.      Patient Active Problem List    Diagnosis Date Noted   • Calculus of gallbladder with chronic cholecystitis without obstruction 12/11/2022     Note Last Updated: 12/11/2022     Noted on U/S - 12/9/22.  Asymptomatic      • Chronic midline low back pain with bilateral sciatica 09/21/2022   • Left shoulder pain 01/08/2020   • Left rotator cuff tear arthropathy 01/08/2020   • Abdominal aortic aneurysm 11/01/2017     Note Last Updated: 1/30/2020      Had AAA repaired in '08 with a sleeve - repeat CT in 9/19 - no evidence of any leaking.      • Presence of cardiac and vascular implant and graft 11/01/2017   • Peroneal tendinitis 04/13/2017   • Lumbar radiculopathy 06/02/2015     Note Last Updated: 11/13/2019     Had series of injections in his back in 2016.  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   • Impaired fasting glucose 03/03/2015   • Essential hypertension 09/30/2014   • Chronic coronary artery disease 09/23/2014     Note Last Updated: 11/13/2019     Had MI on 2014 - hed emergent cath - had 2 stents     • S/P AAA repair 08/24/2014   • Carotid bruit 09/24/2013   • Hay fever 03/22/2013   • Inguinal hernia, right 04/27/2012   • Arteriosclerotic vascular disease 01/31/2012   • Benign prostatic hyperplasia with urinary obstruction 01/31/2012   • Degenerative joint disease 01/31/2012   • Inflammatory disease of prostate 01/31/2012   • Systolic murmur 01/31/2012   • Mixed hyperlipidemia 12/20/2011           Past Surgical History:   Procedure Laterality Date   • ABDOMINAL AORTIC ANEURYSM REPAIR     • BACK SURGERY  11/17/2016   • CARDIAC CATHETERIZATION     • CATARACT EXTRACTION, BILATERAL     • CORONARY ANGIOPLASTY WITH STENT PLACEMENT     • INGUINAL HERNIA REPAIR     • RENAL ARTERY STENT Left 06/11/2008     Current Outpatient Medications on File Prior to Visit   Medication Sig   • aspirin (aspirin) 81 MG EC tablet Take 81 mg by mouth Daily.   • fexofenadine (Allegra Allergy) 180 MG tablet Take 1 tablet by mouth Daily. (Patient taking differently: Take 180 mg by mouth As Needed.)   • finasteride (PROSCAR) 5 MG tablet Take 5 mg by mouth Daily.   • fluticasone (Flonase) 50 MCG/ACT nasal spray 2 sprays into the nostril(s) as directed by provider Daily.   • gabapentin (NEURONTIN) 100 MG capsule Take 1 capsule by mouth 3 (Three) Times a Day.   • HYDROcodone-acetaminophen (NORCO)  MG per tablet Take 1  "tablet by mouth Every 12 (Twelve) Hours As Needed. for pain   • metoprolol tartrate (LOPRESSOR) 25 MG tablet Takes 1/2 tablet by mouth twice daily.   • pravastatin (PRAVACHOL) 80 MG tablet Take 1 tablet by mouth Every Night.   • tamsulosin (FLOMAX) 0.4 MG capsule 24 hr capsule TAKE 1 CAPSULE DAILY     No current facility-administered medications on file prior to visit.     No Known Allergies  Social History     Socioeconomic History   • Marital status:    Tobacco Use   • Smoking status: Every Day     Packs/day: 0.25     Years: 62.00     Pack years: 15.50     Types: Cigarettes     Start date: 1959     Passive exposure: Current   • Smokeless tobacco: Never   Vaping Use   • Vaping Use: Never used   Substance and Sexual Activity   • Alcohol use: No   • Drug use: Never   • Sexual activity: Not Currently     Family History   Family history unknown: Yes       Review of Systems    Objective   /70 (BP Location: Left arm, Patient Position: Sitting, Cuff Size: Adult)   Pulse 62   Temp 97.1 °F (36.2 °C) (Infrared)   Resp 16   Ht 175.3 cm (69.02\")   Wt 70.1 kg (154 lb 9.6 oz)   SpO2 98%   BMI 22.82 kg/m²   Physical Exam  Constitutional:       Appearance: He is well-developed.      Comments: Wearing a face mask  Elderly white male, looks younger than his known age of 81 years   HENT:      Head: Normocephalic and atraumatic.   Eyes:      Conjunctiva/sclera: Conjunctivae normal.   Cardiovascular:      Rate and Rhythm: Normal rate.      Heart sounds: Murmur (stable) heard.    Systolic murmur is present with a grade of 4/6.  Pulmonary:      Effort: Pulmonary effort is normal.   Musculoskeletal:         General: Normal range of motion.      Cervical back: Normal range of motion.      Right lower leg: No edema.      Left lower leg: No edema.   Skin:     General: Skin is warm and dry.      Findings: No rash.   Neurological:      Mental Status: He is alert and oriented to person, place, and time.      Gait: Gait " abnormal (Slightly stooped, good arm swing).   Psychiatric:         Mood and Affect: Mood normal.         Behavior: Behavior normal.           Assessment & Plan   Diagnoses and all orders for this visit:    1. Claudication, intermittent (HCC) (Primary)    2. Peripheral arterial disease (HCC)    3. Benign prostatic hyperplasia with urinary obstruction    4. Essential hypertension  -     Comprehensive Metabolic Panel; Future  -     CBC & Differential; Future    5. Systolic murmur    6. Chronic coronary artery disease    7. Screening for prostate cancer  -     PSA Screen; Future    8. Mixed hyperlipidemia  -     Lipid Panel; Future    Status of multiple chronic medical problems reviewed today.  Overall, Yordy is in his chronic stable state.  Peripheral arterial disease is stable.  He has no sores or ulcers on his legs.  He will continue to follow-up with vascular twice a year for ultrasounds.  BPH symptoms are stable.  Keep follow-up with urology.  Blood pressure is good today.  I would not change any of his medicines.  He is stable from the standpoint of his coronary artery disease.  Systolic murmur is unchanged.  He has no signs of fluid overload or heart failure.  Keep follow-up with cardiology per their recommendations.  We will plan to see him back in late September and we will do blood work a few days before.  It will be time then for his annual lab work.        Call with any problems or concerns before next visit       Return in about 7 months (around 9/28/2023), or with labs a few days before.      Much of this report is an electronic transcription of spoken language to printed text using Dragon dictation software.  As such, the subtleties and finesse of spoken language may permit erroneous, or at times, nonsensical words or phrases to be inadvertently transcribed; thus changes may be made at a later date to rectify these errors.     Garima Jain MD2/14/202315:24 EST  This note has been electronically  signed

## 2023-03-06 NOTE — PROGRESS NOTES
Encounter Date:03/07/2023      Patient ID: Yordy Hebert is a 82 y.o. male.    Chief Complaint   Patient presents with   • Follow-up     6 month F/U   • Coronary Artery Disease          History of Present Illness  82-year-old with history of CAD, hypertension, hyperlipidemia, PVD, AAA status postrepair who presents for follow-up.  He says he has been doing well from a cardiac perspective.  Denies any chest pain or shortness of breath.  No lower extremity edema or lightheadedness or dizziness.  No orthopnea or PND.  He has chronic back problems for which he gets pain shots occasionally and also takes hydrocodone.  He mentions he has had 2 stents placed in 2014 but he does not have a stent card with him today.  He has not had any repeated episodes of angina since that MI.      EKG done in clinic today shows normal sinus rhythm, with occasional PVCs and left axis deviation.  Rate of 61 bpm.  No change compared to prior EKG.      The following portions of the patient's history were reviewed and updated as appropriate: allergies, current medications, past family history, past medical history, past social history, past surgical history, and problem list.    Review of Systems   Constitutional: Negative for malaise/fatigue.   Cardiovascular: Negative for chest pain, dyspnea on exertion, leg swelling and palpitations.   Respiratory: Negative for cough and shortness of breath.    Gastrointestinal: Negative for abdominal pain, nausea and vomiting.   Neurological: Negative for dizziness, focal weakness, headaches, light-headedness and numbness.   All other systems reviewed and are negative.        Current Outpatient Medications:   •  aspirin (aspirin) 81 MG EC tablet, Take 1 tablet by mouth Daily., Disp: , Rfl:   •  fexofenadine (Allegra Allergy) 180 MG tablet, Take 1 tablet by mouth Daily. (Patient taking differently: Take 1 tablet by mouth As Needed.), Disp: 90 tablet, Rfl: 3  •  finasteride (PROSCAR) 5 MG tablet, Take 1  tablet by mouth Daily., Disp: , Rfl:   •  fluticasone (Flonase) 50 MCG/ACT nasal spray, 2 sprays into the nostril(s) as directed by provider Daily., Disp: 18.2 mL, Rfl: 0  •  gabapentin (NEURONTIN) 100 MG capsule, Take 1 capsule by mouth 3 (Three) Times a Day., Disp: 90 capsule, Rfl: 1  •  HYDROcodone-acetaminophen (NORCO)  MG per tablet, Take 1 tablet by mouth Every 12 (Twelve) Hours As Needed. for pain, Disp: , Rfl:   •  pravastatin (PRAVACHOL) 80 MG tablet, Take 1 tablet by mouth Every Night., Disp: 90 tablet, Rfl: 1  •  tamsulosin (FLOMAX) 0.4 MG capsule 24 hr capsule, TAKE 1 CAPSULE DAILY, Disp: 90 capsule, Rfl: 3  •  metoprolol succinate XL (TOPROL-XL) 25 MG 24 hr tablet, Take 0.5 tablets by mouth Daily., Disp: 45 tablet, Rfl: 3    No Known Allergies    Family History   Family history unknown: Yes       Past Surgical History:   Procedure Laterality Date   • ABDOMINAL AORTIC ANEURYSM REPAIR     • BACK SURGERY  11/17/2016   • CARDIAC CATHETERIZATION     • CATARACT EXTRACTION, BILATERAL     • CORONARY ANGIOPLASTY WITH STENT PLACEMENT     • INGUINAL HERNIA REPAIR     • RENAL ARTERY STENT Left 06/11/2008       Past Medical History:   Diagnosis Date   • Coronary artery disease    • DJD (degenerative joint disease)     (L) hip   • Family history of abdominal aortic aneurysm (AAA) repair 1/31/2012   • Foot pain, left 4/13/2017   • History of AAA (abdominal aortic aneurysm) repair    • Hyperlipidemia    • Hypertension    • Inguinal hernia     right   • Prostatitis        Social History     Socioeconomic History   • Marital status:    Tobacco Use   • Smoking status: Every Day     Packs/day: 0.25     Years: 62.00     Pack years: 15.50     Types: Cigarettes     Start date: 1959     Passive exposure: Current   • Smokeless tobacco: Never   Vaping Use   • Vaping Use: Never used   Substance and Sexual Activity   • Alcohol use: No   • Drug use: Never   • Sexual activity: Not Currently  "        Procedures      Objective:       Physical Exam    /56 (BP Location: Left arm, Patient Position: Sitting)   Pulse 61   Ht 177.8 cm (70\")   Wt 70.8 kg (156 lb)   SpO2 98%   BMI 22.38 kg/m²   The patient is alert, oriented and in no distress.    Vital signs as noted above.    Head and neck revealed no carotid bruits or jugular venous distension.  No thyromegaly or lymphadenopathy is present.    Lungs clear.  No wheezing.  Breath sounds are normal bilaterally.    Heart normal first and second heart sounds.  No murmur..  No pericardial rub is present.  No gallop is present.    Abdomen soft and nontender.  No organomegaly is present.    Extremities revealed good peripheral pulses without any pedal edema.    Skin warm and dry.    Musculoskeletal system is grossly normal.    CNS grossly normal.           Diagnosis Plan   1. Essential hypertension        2. Chronic coronary artery disease        3. Mixed hyperlipidemia        LAB RESULTS (LAST 7 DAYS)    CBC        BMP        CMP         BNP        TROPONIN        CoAg        Creatinine Clearance  CrCl cannot be calculated (Patient's most recent lab result is older than the maximum 30 days allowed.).    ABG        Radiology  No radiology results for the last day         Assessment and Plan       Diagnoses and all orders for this visit:    1. Essential hypertension (Primary)    2. Chronic coronary artery disease  Overview:  Had MI on 2014 - [facility] emergent cath - had 2 stents      3. Mixed hyperlipidemia    Other orders  -     metoprolol succinate XL (TOPROL-XL) 25 MG 24 hr tablet; Take 0.5 tablets by mouth Daily.  Dispense: 45 tablet; Refill: 3         Coronary artery disease  Patient reports previous PCI with 2 stents in 2014  Continue with aspirin, statin, metoprolol  Currently chest pain-free    Hypertension  Well-controlled  Currently only on metoprolol  Change metoprolol to XL    Hyperlipidemia  On pravastatin    45 minutes spent in patient care on this " date of service including but not limited to preparing to see the patient, obtaining and/or reviewing history, performing medically appropriate examination, ordering tests and procedures, independently interpreting results and documenting information in EHR and counseling and education of patient and family.  It this excludes time spent on other separate services such as performing procedures or test interpretation if applicable    Nadia Jalloh MD

## 2023-03-07 ENCOUNTER — OFFICE VISIT (OUTPATIENT)
Dept: CARDIOLOGY | Facility: CLINIC | Age: 83
End: 2023-03-07
Payer: MEDICARE

## 2023-03-07 VITALS
WEIGHT: 156 LBS | OXYGEN SATURATION: 98 % | HEART RATE: 61 BPM | HEIGHT: 70 IN | SYSTOLIC BLOOD PRESSURE: 133 MMHG | BODY MASS INDEX: 22.33 KG/M2 | DIASTOLIC BLOOD PRESSURE: 56 MMHG

## 2023-03-07 DIAGNOSIS — I10 ESSENTIAL HYPERTENSION: Primary | ICD-10-CM

## 2023-03-07 DIAGNOSIS — E78.2 MIXED HYPERLIPIDEMIA: ICD-10-CM

## 2023-03-07 DIAGNOSIS — I25.10 CHRONIC CORONARY ARTERY DISEASE: ICD-10-CM

## 2023-03-07 PROCEDURE — 93010 ELECTROCARDIOGRAM REPORT: CPT | Performed by: INTERNAL MEDICINE

## 2023-03-07 PROCEDURE — 99215 OFFICE O/P EST HI 40 MIN: CPT | Performed by: INTERNAL MEDICINE

## 2023-03-07 RX ORDER — METOPROLOL SUCCINATE 25 MG/1
12.5 TABLET, EXTENDED RELEASE ORAL DAILY
Qty: 45 TABLET | Refills: 3 | Status: SHIPPED | OUTPATIENT
Start: 2023-03-07

## 2023-05-02 ENCOUNTER — OFFICE VISIT (OUTPATIENT)
Dept: FAMILY MEDICINE CLINIC | Facility: CLINIC | Age: 83
End: 2023-05-02
Payer: MEDICARE

## 2023-05-02 VITALS
SYSTOLIC BLOOD PRESSURE: 124 MMHG | OXYGEN SATURATION: 95 % | HEIGHT: 70 IN | TEMPERATURE: 97.6 F | HEART RATE: 78 BPM | BODY MASS INDEX: 22.9 KG/M2 | WEIGHT: 160 LBS | DIASTOLIC BLOOD PRESSURE: 72 MMHG

## 2023-05-02 DIAGNOSIS — J30.1 HAY FEVER: ICD-10-CM

## 2023-05-02 DIAGNOSIS — R05.9 COUGH, UNSPECIFIED TYPE: Primary | ICD-10-CM

## 2023-05-02 LAB
EXPIRATION DATE: NORMAL
FLUAV AG UPPER RESP QL IA.RAPID: NOT DETECTED
FLUBV AG UPPER RESP QL IA.RAPID: NOT DETECTED
INTERNAL CONTROL: NORMAL
Lab: NORMAL
SARS-COV-2 AG UPPER RESP QL IA.RAPID: NOT DETECTED

## 2023-05-02 PROCEDURE — 99213 OFFICE O/P EST LOW 20 MIN: CPT | Performed by: NURSE PRACTITIONER

## 2023-05-02 PROCEDURE — 3074F SYST BP LT 130 MM HG: CPT | Performed by: NURSE PRACTITIONER

## 2023-05-02 PROCEDURE — 3078F DIAST BP <80 MM HG: CPT | Performed by: NURSE PRACTITIONER

## 2023-05-02 PROCEDURE — 87428 SARSCOV & INF VIR A&B AG IA: CPT | Performed by: NURSE PRACTITIONER

## 2023-05-02 RX ORDER — LORATADINE 10 MG/1
10 TABLET ORAL DAILY
Qty: 30 TABLET | Refills: 2 | Status: SHIPPED | OUTPATIENT
Start: 2023-05-02

## 2023-05-02 RX ORDER — FEXOFENADINE HCL 180 MG/1
180 TABLET ORAL AS NEEDED
Status: CANCELLED | OUTPATIENT
Start: 2023-05-02

## 2023-05-02 RX ORDER — FLUTICASONE PROPIONATE 50 MCG
2 SPRAY, SUSPENSION (ML) NASAL DAILY
Qty: 18.2 ML | Refills: 0 | Status: SHIPPED | OUTPATIENT
Start: 2023-05-02

## 2023-05-02 RX ORDER — AZITHROMYCIN 250 MG/1
TABLET, FILM COATED ORAL
Qty: 6 TABLET | Refills: 0 | Status: SHIPPED | OUTPATIENT
Start: 2023-05-02

## 2023-05-02 NOTE — PROGRESS NOTES
"    Yordy Hebert is a 82 y.o. male.     History of Present Illness  82-year-old white male who comes in with complaints of fatigue sinus congestion nonproductive cough.  He was negative for COVID influenza exam really shows heavy allergic rhinitis but patient is convinced he has a sinus infection.  I will treat him with a Z-Kristopher and allergy medication advised him to wear mask when going outside and to let myself or Dr. Jain know if he does not improve    Vital signs are stable                Z-Kristopher  Claritin 10 mg a.m./Flonase nasal spray/Benadryl 25  at bedtime when allergies are bad  Wear mask when outside       The following portions of the patient's history were reviewed and updated as appropriate: allergies, current medications, past family history, past medical history, past social history, past surgical history and problem list.    Vitals:    05/02/23 1335   BP: 124/72   BP Location: Right arm   Patient Position: Sitting   Cuff Size: Adult   Pulse: 78   Temp: 97.6 °F (36.4 °C)   TempSrc: Temporal   SpO2: 95%   Weight: 72.6 kg (160 lb)   Height: 177.8 cm (70\")     Body mass index is 22.96 kg/m².    Past Medical History:   Diagnosis Date   • Coronary artery disease    • DJD (degenerative joint disease)     (L) hip   • Family history of abdominal aortic aneurysm (AAA) repair 1/31/2012   • Foot pain, left 4/13/2017   • History of AAA (abdominal aortic aneurysm) repair    • Hyperlipidemia    • Hypertension    • Inguinal hernia     right   • Prostatitis      Past Surgical History:   Procedure Laterality Date   • ABDOMINAL AORTIC ANEURYSM REPAIR     • BACK SURGERY  11/17/2016   • CARDIAC CATHETERIZATION     • CATARACT EXTRACTION, BILATERAL     • CORONARY ANGIOPLASTY WITH STENT PLACEMENT     • INGUINAL HERNIA REPAIR     • RENAL ARTERY STENT Left 06/11/2008     Family History   Family history unknown: Yes     Immunization History   Administered Date(s) Administered   • COVID-19 (MODERNA) 1st,2nd,3rd Dose " Monovalent 02/04/2021, 03/04/2021   • COVID-19 (MODERNA) Monovalent Original Booster 12/15/2021   • FLUAD TRI 65YR+ 10/23/2019   • Flu Vaccine Split Quad 10/29/2021   • Fluad Quad 65+ 10/20/2020   • Fluzone High Dose =>65 Years ([employer] ONLY) 11/24/2015, 09/28/2016, 10/17/2018   • Fluzone High-Dose 65+yrs 10/05/2022   • Pneumococcal Conjugate 13-Valent (PCV13) 01/08/2018       Hospital Outpatient Visit on 11/17/2022   Component Date Value Ref Range Status   • Creatinine 11/17/2022 0.90  0.60 - 1.30 mg/dL Final    Serial Number: [id number]:  [id number]   • eGFR 11/17/2022 85.3  >60.0 mL/min/1.73 Final         Review of Systems   HENT: Positive for congestion and postnasal drip.    Respiratory: Positive for cough.    Cardiovascular: Negative.    Gastrointestinal: Negative.    Genitourinary: Negative.    Musculoskeletal: Negative.    Skin: Negative.    Neurological: Negative.    Psychiatric/Behavioral: Negative.        Objective   Physical Exam  Constitutional:       Appearance: Normal appearance.   HENT:      Nose:      Comments: Pink and boggy     Mouth/Throat:      Comments: Heavy postnasal drip  Cardiovascular:      Rate and Rhythm: Normal rate and regular rhythm.      Pulses: Normal pulses.      Heart sounds: Normal heart sounds.   Pulmonary:      Effort: Pulmonary effort is normal.      Breath sounds: Normal breath sounds.   Abdominal:      General: Bowel sounds are normal.   Musculoskeletal:         General: Normal range of motion.   Skin:     General: Skin is warm and dry.   Neurological:      General: No focal deficit present.      Mental Status: He is alert and oriented to person, place, and time.   Psychiatric:         Mood and Affect: Mood normal.         Behavior: Behavior normal.         Procedures    Assessment & Plan   Diagnoses and all orders for this visit:    1. Cough, unspecified type (Primary)  -     POCT SARS-CoV-2 Antigen BRIA + Flu    2. Hay fever  -     fluticasone (Flonase) 50 MCG/ACT nasal  spray; 2 sprays into the nostril(s) as directed by provider Daily.  Dispense: 18.2 mL; Refill: 0    Other orders  -     azithromycin (Zithromax Z-Kristopher) 250 MG tablet; Take 2 tablets the first day, then 1 tablet daily for 4 days.  Dispense: 6 tablet; Refill: 0  -     loratadine (Claritin) 10 MG tablet; Take 1 tablet by mouth Daily.  Dispense: 30 tablet; Refill: 2          Current Outpatient Medications:   •  aspirin (aspirin) 81 MG EC tablet, Take 1 tablet by mouth Daily., Disp: , Rfl:   •  fexofenadine (Allegra Allergy) 180 MG tablet, Take 1 tablet by mouth Daily. (Patient taking differently: Take 1 tablet by mouth As Needed.), Disp: 90 tablet, Rfl: 3  •  finasteride (PROSCAR) 5 MG tablet, Take 1 tablet by mouth Daily., Disp: , Rfl:   •  fluticasone (Flonase) 50 MCG/ACT nasal spray, 2 sprays into the nostril(s) as directed by provider Daily., Disp: 18.2 mL, Rfl: 0  •  gabapentin (NEURONTIN) 100 MG capsule, Take 1 capsule by mouth 3 (Three) Times a Day., Disp: 90 capsule, Rfl: 1  •  HYDROcodone-acetaminophen (NORCO)  MG per tablet, Take 1 tablet by mouth Every 12 (Twelve) Hours As Needed. for pain, Disp: , Rfl:   •  metoprolol succinate XL (TOPROL-XL) 25 MG 24 hr tablet, Take 0.5 tablets by mouth Daily., Disp: 45 tablet, Rfl: 3  •  pravastatin (PRAVACHOL) 80 MG tablet, Take 1 tablet by mouth Every Night., Disp: 90 tablet, Rfl: 1  •  tamsulosin (FLOMAX) 0.4 MG capsule 24 hr capsule, TAKE 1 CAPSULE DAILY, Disp: 90 capsule, Rfl: 3  •  azithromycin (Zithromax Z-Kristopher) 250 MG tablet, Take 2 tablets the first day, then 1 tablet daily for 4 days., Disp: 6 tablet, Rfl: 0  •  loratadine (Claritin) 10 MG tablet, Take 1 tablet by mouth Daily., Disp: 30 tablet, Rfl: 2           Jennifer Garner, APRN 5/2/2023 15:48 EDT  This note has been electronically signed

## 2023-05-02 NOTE — PATIENT INSTRUCTIONS
Z-Kristopher  Claritin 10 mg a.m./Flonase nasal spray/Benadryl 25  at bedtime when allergies are bad  Wear mask when outside

## 2023-05-22 RX ORDER — PRAVASTATIN SODIUM 80 MG/1
80 TABLET ORAL NIGHTLY
Qty: 90 TABLET | Refills: 1 | Status: SHIPPED | OUTPATIENT
Start: 2023-05-22

## 2023-06-19 ENCOUNTER — TELEPHONE (OUTPATIENT)
Dept: FAMILY MEDICINE CLINIC | Facility: CLINIC | Age: 83
End: 2023-06-19
Payer: MEDICARE

## 2023-08-01 PROCEDURE — 88305 TISSUE EXAM BY PATHOLOGIST: CPT | Performed by: UROLOGY

## 2023-08-02 ENCOUNTER — LAB REQUISITION (OUTPATIENT)
Dept: LAB | Facility: HOSPITAL | Age: 83
End: 2023-08-02
Payer: MEDICARE

## 2023-08-02 DIAGNOSIS — N40.1 BENIGN PROSTATIC HYPERPLASIA WITH LOWER URINARY TRACT SYMPTOMS: ICD-10-CM

## 2023-08-03 ENCOUNTER — HOSPITAL ENCOUNTER (OUTPATIENT)
Facility: HOSPITAL | Age: 83
Setting detail: OBSERVATION
Discharge: HOME | End: 2023-08-04
Attending: EMERGENCY MEDICINE | Admitting: EMERGENCY MEDICINE
Payer: MEDICARE

## 2023-08-03 DIAGNOSIS — R31.9 HEMATURIA, UNSPECIFIED TYPE: Primary | ICD-10-CM

## 2023-08-03 DIAGNOSIS — J30.1 HAY FEVER: ICD-10-CM

## 2023-08-03 LAB
ANION GAP SERPL CALCULATED.3IONS-SCNC: 12 MMOL/L (ref 5–15)
APTT PPP: 30.8 SECONDS (ref 61–76.5)
BASOPHILS # BLD AUTO: 0 10*3/MM3 (ref 0–0.2)
BASOPHILS NFR BLD AUTO: 0.2 % (ref 0–1.5)
BUN SERPL-MCNC: 29 MG/DL (ref 8–23)
BUN/CREAT SERPL: 22.5 (ref 7–25)
CALCIUM SPEC-SCNC: 10.4 MG/DL (ref 8.6–10.5)
CHLORIDE SERPL-SCNC: 92 MMOL/L (ref 98–107)
CO2 SERPL-SCNC: 27 MMOL/L (ref 22–29)
CREAT SERPL-MCNC: 1.29 MG/DL (ref 0.76–1.27)
DEPRECATED RDW RBC AUTO: 44.6 FL (ref 37–54)
EGFRCR SERPLBLD CKD-EPI 2021: 55 ML/MIN/1.73
EOSINOPHIL # BLD AUTO: 0 10*3/MM3 (ref 0–0.4)
EOSINOPHIL NFR BLD AUTO: 0.1 % (ref 0.3–6.2)
ERYTHROCYTE [DISTWIDTH] IN BLOOD BY AUTOMATED COUNT: 13.1 % (ref 12.3–15.4)
GLUCOSE SERPL-MCNC: 126 MG/DL (ref 65–99)
HCT VFR BLD AUTO: 33.9 % (ref 37.5–51)
HGB BLD-MCNC: 11.3 G/DL (ref 13–17.7)
INR PPP: 0.99 (ref 0.93–1.1)
LAB AP CASE REPORT: NORMAL
LYMPHOCYTES # BLD AUTO: 0.3 10*3/MM3 (ref 0.7–3.1)
LYMPHOCYTES NFR BLD AUTO: 3.8 % (ref 19.6–45.3)
MCH RBC QN AUTO: 30.9 PG (ref 26.6–33)
MCHC RBC AUTO-ENTMCNC: 33.3 G/DL (ref 31.5–35.7)
MCV RBC AUTO: 92.9 FL (ref 79–97)
MONOCYTES # BLD AUTO: 0.8 10*3/MM3 (ref 0.1–0.9)
MONOCYTES NFR BLD AUTO: 9.9 % (ref 5–12)
NEUTROPHILS NFR BLD AUTO: 7.3 10*3/MM3 (ref 1.7–7)
NEUTROPHILS NFR BLD AUTO: 86 % (ref 42.7–76)
NRBC BLD AUTO-RTO: 0.2 /100 WBC (ref 0–0.2)
PATH REPORT.FINAL DX SPEC: NORMAL
PATH REPORT.GROSS SPEC: NORMAL
PLATELET # BLD AUTO: 146 10*3/MM3 (ref 140–450)
PMV BLD AUTO: 7 FL (ref 6–12)
POTASSIUM SERPL-SCNC: 4.5 MMOL/L (ref 3.5–5.2)
PROTHROMBIN TIME: 10.6 SECONDS (ref 9.6–11.7)
RBC # BLD AUTO: 3.65 10*6/MM3 (ref 4.14–5.8)
SODIUM SERPL-SCNC: 131 MMOL/L (ref 136–145)
WBC NRBC COR # BLD: 8.5 10*3/MM3 (ref 3.4–10.8)

## 2023-08-03 PROCEDURE — G0378 HOSPITAL OBSERVATION PER HR: HCPCS

## 2023-08-03 PROCEDURE — 85610 PROTHROMBIN TIME: CPT | Performed by: EMERGENCY MEDICINE

## 2023-08-03 PROCEDURE — 80048 BASIC METABOLIC PNL TOTAL CA: CPT | Performed by: EMERGENCY MEDICINE

## 2023-08-03 PROCEDURE — 85025 COMPLETE CBC W/AUTO DIFF WBC: CPT | Performed by: EMERGENCY MEDICINE

## 2023-08-03 PROCEDURE — 85730 THROMBOPLASTIN TIME PARTIAL: CPT | Performed by: EMERGENCY MEDICINE

## 2023-08-03 PROCEDURE — 99284 EMERGENCY DEPT VISIT MOD MDM: CPT

## 2023-08-03 RX ORDER — CHOLECALCIFEROL (VITAMIN D3) 125 MCG
5 CAPSULE ORAL NIGHTLY PRN
Status: DISCONTINUED | OUTPATIENT
Start: 2023-08-03 | End: 2023-08-04 | Stop reason: HOSPADM

## 2023-08-03 RX ORDER — AMOXICILLIN 250 MG
2 CAPSULE ORAL 2 TIMES DAILY
Status: DISCONTINUED | OUTPATIENT
Start: 2023-08-03 | End: 2023-08-04 | Stop reason: HOSPADM

## 2023-08-03 RX ORDER — ONDANSETRON 2 MG/ML
4 INJECTION INTRAMUSCULAR; INTRAVENOUS EVERY 6 HOURS PRN
Status: DISCONTINUED | OUTPATIENT
Start: 2023-08-03 | End: 2023-08-04 | Stop reason: HOSPADM

## 2023-08-03 RX ORDER — BISACODYL 5 MG/1
5 TABLET, DELAYED RELEASE ORAL DAILY PRN
Status: DISCONTINUED | OUTPATIENT
Start: 2023-08-03 | End: 2023-08-04 | Stop reason: HOSPADM

## 2023-08-03 RX ORDER — METOPROLOL SUCCINATE 25 MG/1
12.5 TABLET, EXTENDED RELEASE ORAL DAILY
Status: DISCONTINUED | OUTPATIENT
Start: 2023-08-04 | End: 2023-08-04 | Stop reason: HOSPADM

## 2023-08-03 RX ORDER — SODIUM CHLORIDE 0.9 % (FLUSH) 0.9 %
10 SYRINGE (ML) INJECTION AS NEEDED
Status: DISCONTINUED | OUTPATIENT
Start: 2023-08-03 | End: 2023-08-04 | Stop reason: HOSPADM

## 2023-08-03 RX ORDER — TAMSULOSIN HYDROCHLORIDE 0.4 MG/1
0.4 CAPSULE ORAL DAILY
Status: DISCONTINUED | OUTPATIENT
Start: 2023-08-04 | End: 2023-08-04 | Stop reason: HOSPADM

## 2023-08-03 RX ORDER — ACETAMINOPHEN,DIPHENHYDRAMINE HCL 500; 25 MG/1; MG/1
2 TABLET, FILM COATED ORAL NIGHTLY PRN
COMMUNITY

## 2023-08-03 RX ORDER — FINASTERIDE 5 MG/1
5 TABLET, FILM COATED ORAL DAILY
Status: DISCONTINUED | OUTPATIENT
Start: 2023-08-04 | End: 2023-08-04 | Stop reason: HOSPADM

## 2023-08-03 RX ORDER — POLYETHYLENE GLYCOL 3350 17 G/17G
17 POWDER, FOR SOLUTION ORAL DAILY PRN
Status: DISCONTINUED | OUTPATIENT
Start: 2023-08-03 | End: 2023-08-04 | Stop reason: HOSPADM

## 2023-08-03 RX ORDER — ACETAMINOPHEN 325 MG/1
650 TABLET ORAL EVERY 4 HOURS PRN
Status: DISCONTINUED | OUTPATIENT
Start: 2023-08-03 | End: 2023-08-04 | Stop reason: HOSPADM

## 2023-08-03 RX ORDER — BISACODYL 10 MG
10 SUPPOSITORY, RECTAL RECTAL DAILY PRN
Status: DISCONTINUED | OUTPATIENT
Start: 2023-08-03 | End: 2023-08-04 | Stop reason: HOSPADM

## 2023-08-03 RX ORDER — SODIUM CHLORIDE 9 MG/ML
40 INJECTION, SOLUTION INTRAVENOUS AS NEEDED
Status: DISCONTINUED | OUTPATIENT
Start: 2023-08-03 | End: 2023-08-04 | Stop reason: HOSPADM

## 2023-08-03 RX ORDER — GABAPENTIN 100 MG/1
100 CAPSULE ORAL 3 TIMES DAILY
Status: DISCONTINUED | OUTPATIENT
Start: 2023-08-03 | End: 2023-08-04

## 2023-08-03 RX ORDER — ATORVASTATIN CALCIUM 20 MG/1
20 TABLET, FILM COATED ORAL DAILY
Status: DISCONTINUED | OUTPATIENT
Start: 2023-08-04 | End: 2023-08-04 | Stop reason: HOSPADM

## 2023-08-03 RX ORDER — SODIUM CHLORIDE 0.9 % (FLUSH) 0.9 %
10 SYRINGE (ML) INJECTION EVERY 12 HOURS SCHEDULED
Status: DISCONTINUED | OUTPATIENT
Start: 2023-08-03 | End: 2023-08-04 | Stop reason: HOSPADM

## 2023-08-03 NOTE — PROGRESS NOTES
Synopsis  Nursing report ED to floor  Yordy Hebert  82 y.o.  male    HPI:   Chief Complaint   Patient presents with    Urinary Retention     Pt reports had prostate surgery per Ce on Tuesday, pt had F/C placed at that time, pt reports little drainage today and called uro and was told to come here for eval. Pt reports mild low abdominal pain, n/v       Admitting doctor:   Christian Reardon MD    Admitting diagnosis:   The encounter diagnosis was Hematuria, unspecified type.    Code status:   Current Code Status       Date Active Code Status Order ID Comments User Context       8/3/2023 1819 CPR (Attempt to Resuscitate) [id number]  Christian Reardon MD ED        Question Answer    Code Status (Patient has no pulse and is not breathing) CPR (Attempt to Resuscitate)    Medical Interventions (Patient has pulse or is breathing) Full Support    Release to patient Routine Release                    Allergies:   Patient has no known allergies.    Isolation:  No active isolations     Fall Risk:  Fall Risk Assessment was completed, and patient is at low risk for falls.   Predictive Model Details         20 (Low) Factor Value    Calculated 8/3/2023 18:06 Age 82    Risk of Fall Model Musculoskeletal Assessment WDL     Active Peripheral IV Present     Skin Assessment WDL     Magnesium not on file     Diastolic BP 62     Financial Class Medicare     Respiratory Rate 17     Drug Use No     Chloride 92 mmol/L     Tobacco Use Current     Dio Scale not on file     Peripheral Vascular Assessment WDL     Cardiac Assessment X     Clinically Relevant Sex Not Female     Gastrointestinal Assessment WDL     Creatinine 1.29 mg/dL     Albumin not on file     Total Bilirubin not on file     Potassium 4.5 mmol/L     Calcium 10.4 mg/dL     Days after Admission 0.078     ALT not on file         Weight:       08/03/23  1610   Weight: 72.6 kg (160 lb)       Intake and Output    Intake/Output Summary (Last 24 hours) at 8/3/2023 1856  Last  data filed at 8/3/2023 1855  Gross per 24 hour   Intake 6000 ml   Output 6450 ml   Net -450 ml       Diet:   Dietary Orders (From admission, onward)       Start     Ordered    08/03/23 1630  NPO Diet NPO Type: Strict NPO  Diet Effective Now        Question:  NPO Type  Answer:  Strict NPO    08/03/23 1629                     Most recent vitals:   Vitals:    08/03/23 1716 08/03/23 1746 08/03/23 1751 08/03/23 1816   BP: 118/54 112/62  123/56   BP Location:       Patient Position:       Pulse: 92 88 88 98   Resp:       Temp:       TempSrc:       SpO2: 95% 95%  95%   Weight:       Height:           Active LDAs/IV Access:   Lines, Drains & Airways       Active LDAs       Name Placement date Placement time Site Days    Peripheral IV 08/03/23 1704 Right Antecubital 08/03/23  1704  Antecubital  less than 1    Urethral Catheter 08/03/23  1716  -- less than 1                    Skin Condition:   Skin Assessments (last day)       None             Labs (abnormal labs have a star):   Labs Reviewed   APTT - Abnormal; Notable for the following components:       Result Value    PTT 30.8 (*)     All other components within normal limits   BASIC METABOLIC PANEL - Abnormal; Notable for the following components:    Glucose 126 (*)     BUN 29 (*)     Creatinine 1.29 (*)     Sodium 131 (*)     Chloride 92 (*)     eGFR 55.0 (*)     All other components within normal limits    Narrative:     GFR Normal >60  Chronic Kidney Disease <60  Kidney Failure <15    The GFR formula is only valid for adults with stable renal function between ages 18 and 70.   CBC WITH AUTO DIFFERENTIAL - Abnormal; Notable for the following components:    RBC 3.65 (*)     Hemoglobin 11.3 (*)     Hematocrit 33.9 (*)     Neutrophil % 86.0 (*)     Lymphocyte % 3.8 (*)     Eosinophil % 0.1 (*)     Neutrophils, Absolute 7.30 (*)     Lymphocytes, Absolute 0.30 (*)     All other components within normal limits   PROTIME-INR - Normal   URINALYSIS W/ CULTURE IF INDICATED   CBC  AND DIFFERENTIAL    Narrative:     The following orders were created for panel order CBC & Differential.  Procedure                               Abnormality         Status                     ---------                               -----------         ------                     CBC Auto Differential[[id number]]        Abnormal            Final result                 Please view results for these tests on the individual orders.       LOC: Person, Place, Time, and Situation    Telemetry:  Observation Unit    Cardiac Monitoring Ordered: no    EKG:   No orders to display       Medications Given in the ED:   Medications   sodium chloride 0.9 % flush 10 mL (has no administration in time range)       Imaging results:  No radiology results for the last day    Social issues:   Social History     Socioeconomic History    Marital status:    Tobacco Use    Smoking status: Every Day     Packs/day: 0.25     Years: 62.00     Pack years: 15.50     Types: Cigarettes     Start date: 1959     Passive exposure: Current    Smokeless tobacco: Never   Vaping Use    Vaping Use: Never used   Substance and Sexual Activity    Alcohol use: No    Drug use: Never    Sexual activity: Not Currently       NIH Stroke Scale:  Interval: (not recorded)  1a. Level of Consciousness: (not recorded)  1b. LOC Questions: (not recorded)  1c. LOC Commands: (not recorded)  2. Best Gaze: (not recorded)  3. Visual: (not recorded)  4. Facial Palsy: (not recorded)  5a. Motor Arm, Left: (not recorded)  5b. Motor Arm, Right: (not recorded)  6a. Motor Leg, Left: (not recorded)  6b. Motor Leg, Right: (not recorded)  7. Limb Ataxia: (not recorded)  8. Sensory: (not recorded)  9. Best Language: (not recorded)  10. Dysarthria: (not recorded)  11. Extinction and Inattention (formerly Neglect): (not recorded)    Total (NIH Stroke Scale): (not recorded)     Additional notable assessment information:     Nursing report ED to floor:  [facility] room [facility]    [place]  [facility]   08/03/23 18:56 EDT       Other

## 2023-08-03 NOTE — ED PROVIDER NOTES
Subjective   History of Present Illness  82-year-old male states he had some decreased urine output earlier today and went to the urology office because he felt like his bladder was not emptying.  He had had prostate surgery on Tuesday and had had some persistent hematuria since that time.  He states a catheter was placed at the urology office and he feels much better with his bladder decompressed but was told to go to the hospital for admission for persistent hematuria.  He denies fevers or chills or flank pain or vomiting  Review of Systems    Past Medical History:   Diagnosis Date    Coronary artery disease     DJD (degenerative joint disease)     (L) hip    Family history of abdominal aortic aneurysm (AAA) repair 1/31/2012    Foot pain, left 4/13/2017    History of AAA (abdominal aortic aneurysm) repair     Hyperlipidemia     Hypertension     Inguinal hernia     right    Prostatitis        No Known Allergies    Past Surgical History:   Procedure Laterality Date    ABDOMINAL AORTIC ANEURYSM REPAIR      BACK SURGERY  11/17/2016    CARDIAC CATHETERIZATION      CATARACT EXTRACTION, BILATERAL      CORONARY ANGIOPLASTY WITH STENT PLACEMENT      INGUINAL HERNIA REPAIR      RENAL ARTERY STENT Left 06/11/2008       Family History   Family history unknown: Yes       Social History     Socioeconomic History    Marital status:    Tobacco Use    Smoking status: Every Day     Packs/day: 0.25     Years: 62.00     Pack years: 15.50     Types: Cigarettes     Start date: 1959     Passive exposure: Current    Smokeless tobacco: Never   Vaping Use    Vaping Use: Never used   Substance and Sexual Activity    Alcohol use: No    Drug use: Never    Sexual activity: Not Currently     Prior to Admission medications    Medication Sig Start Date End Date Taking? Authorizing Provider   aspirin (aspirin) 81 MG EC tablet Take 1 tablet by mouth Daily. 2/6/12   Provider, MD Yevgeniy   azithromycin (Zithromax Z-Kristopher) 250 MG tablet Take  "2 tablets the first day, then 1 tablet daily for 4 days. 5/2/23   Jennifer Garner APRN   fexofenadine (Allegra Allergy) 180 MG tablet Take 1 tablet by mouth Daily.  Patient taking differently: Take 1 tablet by mouth As Needed. 7/30/21   Garima Jain MD   finasteride (PROSCAR) 5 MG tablet Take 1 tablet by mouth Daily. 6/5/19   Provider, MD Yevgeniy   fluticasone (Flonase) 50 MCG/ACT nasal spray 2 sprays into the nostril(s) as directed by provider Daily. 5/2/23   Jennifer Garner APRN   gabapentin (NEURONTIN) 100 MG capsule Take 1 capsule by mouth 3 (Three) Times a Day. 10/24/22   Garima Jain MD   HYDROcodone-acetaminophen (NORCO)  MG per tablet Take 1 tablet by mouth Every 12 (Twelve) Hours As Needed. for pain 10/7/22   Alphonse Haas MD   loratadine (Claritin) 10 MG tablet Take 1 tablet by mouth Daily. 5/2/23   Jennifer Garner APRN   metoprolol succinate XL (TOPROL-XL) 25 MG 24 hr tablet Take 0.5 tablets by mouth Daily. 3/7/23   Nadia Jalloh MD   pravastatin (PRAVACHOL) 80 MG tablet Take 1 tablet by mouth Every Night. 5/22/23   Garima Jain MD   tamsulosin (FLOMAX) 0.4 MG capsule 24 hr capsule TAKE 1 CAPSULE DAILY 1/8/21   Garima Jain MD     /62   Pulse 88   Temp 98.7 °F (37.1 °C) (Oral)   Resp 17   Ht 175.3 cm (69\")   Wt 72.6 kg (160 lb)   SpO2 95%   BMI 23.63 kg/m²         Objective   Physical Exam  General: Well-developed well-appearing, no acute distress, alert and appropriate  Eyes:  sclera nonicteric  HEENT: Mucous membranes moist, no mucosal swelling  Neck: Supple, no nuchal rigidity,   Respirations: Respirations nonlabored, equal breath sounds bilaterally, clear lungs  Heart regular rate and rhythm,    Abdomen soft nontender nondistended, no hepatosplenomegaly, no hernia, no mass, normal bowel sounds, no CVA tenderness, Taylor catheter in place, some bloody urine output in the catheter bag  Extremities no clubbing cyanosis or edema, calves " are symmetric and nontender  Neuro cranial nerves grossly intact, no focal limb deficits  Psych oriented, pleasant affect  Skin no rash, brisk cap refill  Procedures           ED Course      Results for orders placed or performed during the hospital encounter of 08/03/23   Protime-INR    Specimen: Blood   Result Value Ref Range    Protime 10.6 9.6 - 11.7 Seconds    INR 0.99 0.93 - 1.10   aPTT    Specimen: Blood   Result Value Ref Range    PTT 30.8 (L) 61.0 - 76.5 seconds   Basic Metabolic Panel    Specimen: Blood   Result Value Ref Range    Glucose 126 (H) 65 - 99 mg/dL    BUN 29 (H) 8 - 23 mg/dL    Creatinine 1.29 (H) 0.76 - 1.27 mg/dL    Sodium 131 (L) 136 - 145 mmol/L    Potassium 4.5 3.5 - 5.2 mmol/L    Chloride 92 (L) 98 - 107 mmol/L    CO2 27.0 22.0 - 29.0 mmol/L    Calcium 10.4 8.6 - 10.5 mg/dL    BUN/Creatinine Ratio 22.5 7.0 - 25.0    Anion Gap 12.0 5.0 - 15.0 mmol/L    eGFR 55.0 (L) >60.0 mL/min/1.73   CBC Auto Differential    Specimen: Blood   Result Value Ref Range    WBC 8.50 3.40 - 10.80 10*3/mm3    RBC 3.65 (L) 4.14 - 5.80 10*6/mm3    Hemoglobin 11.3 (L) 13.0 - 17.7 g/dL    Hematocrit 33.9 (L) 37.5 - 51.0 %    MCV 92.9 79.0 - 97.0 fL    MCH 30.9 26.6 - 33.0 pg    MCHC 33.3 31.5 - 35.7 g/dL    RDW 13.1 12.3 - 15.4 %    RDW-SD 44.6 37.0 - 54.0 fl    MPV 7.0 6.0 - 12.0 fL    Platelets 146 140 - 450 10*3/mm3    Neutrophil % 86.0 (H) 42.7 - 76.0 %    Lymphocyte % 3.8 (L) 19.6 - 45.3 %    Monocyte % 9.9 5.0 - 12.0 %    Eosinophil % 0.1 (L) 0.3 - 6.2 %    Basophil % 0.2 0.0 - 1.5 %    Neutrophils, Absolute 7.30 (H) 1.70 - 7.00 10*3/mm3    Lymphocytes, Absolute 0.30 (L) 0.70 - 3.10 10*3/mm3    Monocytes, Absolute 0.80 0.10 - 0.90 10*3/mm3    Eosinophils, Absolute 0.00 0.00 - 0.40 10*3/mm3    Basophils, Absolute 0.00 0.00 - 0.20 10*3/mm3    nRBC 0.2 0.0 - 0.2 /100 WBC                                          Medical Decision Making  Patient presents with hematuria has been persistent since his prostate surgery  earlier this week.  He apparently had some urinary retention earlier today likely due to some clots.  He catheter was placed at the urology office and sent to the ER for persistent hematuria.  He is resting comfortably during the emergency room course and is pain-free.  He did have some persistent hematuria.  In discussion with Dr. Sweet with urology patient was ordered continuous bladder irrigation and will be placed in hospital observation with urology consultation.  Patient is agreeable to the plan.  Patient has no fever or leukocytosis.  Urinalysis ordered and pending at the time of transition to observation.    Problems Addressed:  Hematuria, unspecified type: complicated acute illness or injury    Amount and/or Complexity of Data Reviewed  Labs: ordered. Decision-making details documented in ED Course.     Details: Mild anemia, no leukocytosis, mild renal insufficiency, coags essentially normal    Risk  Prescription drug management.  Decision regarding hospitalization.        Final diagnoses:   Hematuria, unspecified type       ED Disposition  ED Disposition       ED Disposition   Decision to Admit    Condition   --    Comment   --               No follow-up provider specified.       Medication List      No changes were made to your prescriptions during this visit.            Christian Reardon MD  08/03/23 1816       Christian Reardon MD  08/03/23 181

## 2023-08-04 ENCOUNTER — READMISSION MANAGEMENT (OUTPATIENT)
Dept: CALL CENTER | Facility: HOSPITAL | Age: 83
End: 2023-08-04
Payer: MEDICARE

## 2023-08-04 VITALS
HEIGHT: 69 IN | TEMPERATURE: 98 F | OXYGEN SATURATION: 95 % | HEART RATE: 85 BPM | RESPIRATION RATE: 17 BRPM | WEIGHT: 160.5 LBS | DIASTOLIC BLOOD PRESSURE: 67 MMHG | BODY MASS INDEX: 23.77 KG/M2 | SYSTOLIC BLOOD PRESSURE: 122 MMHG

## 2023-08-04 LAB
ANION GAP SERPL CALCULATED.3IONS-SCNC: 9 MMOL/L (ref 5–15)
BASOPHILS # BLD AUTO: 0 10*3/MM3 (ref 0–0.2)
BASOPHILS NFR BLD AUTO: 0.3 % (ref 0–1.5)
BUN SERPL-MCNC: 18 MG/DL (ref 8–23)
BUN/CREAT SERPL: 22 (ref 7–25)
CALCIUM SPEC-SCNC: 9.4 MG/DL (ref 8.6–10.5)
CHLORIDE SERPL-SCNC: 98 MMOL/L (ref 98–107)
CO2 SERPL-SCNC: 29 MMOL/L (ref 22–29)
CREAT SERPL-MCNC: 0.82 MG/DL (ref 0.76–1.27)
DEPRECATED RDW RBC AUTO: 44.2 FL (ref 37–54)
EGFRCR SERPLBLD CKD-EPI 2021: 87.2 ML/MIN/1.73
EOSINOPHIL # BLD AUTO: 0.2 10*3/MM3 (ref 0–0.4)
EOSINOPHIL NFR BLD AUTO: 2.9 % (ref 0.3–6.2)
ERYTHROCYTE [DISTWIDTH] IN BLOOD BY AUTOMATED COUNT: 13.5 % (ref 12.3–15.4)
GLUCOSE SERPL-MCNC: 97 MG/DL (ref 65–99)
HCT VFR BLD AUTO: 29.5 % (ref 37.5–51)
HGB BLD-MCNC: 10 G/DL (ref 13–17.7)
LYMPHOCYTES # BLD AUTO: 0.6 10*3/MM3 (ref 0.7–3.1)
LYMPHOCYTES NFR BLD AUTO: 10.2 % (ref 19.6–45.3)
MCH RBC QN AUTO: 32.1 PG (ref 26.6–33)
MCHC RBC AUTO-ENTMCNC: 34 G/DL (ref 31.5–35.7)
MCV RBC AUTO: 94.6 FL (ref 79–97)
MONOCYTES # BLD AUTO: 0.8 10*3/MM3 (ref 0.1–0.9)
MONOCYTES NFR BLD AUTO: 14.4 % (ref 5–12)
NEUTROPHILS NFR BLD AUTO: 4.3 10*3/MM3 (ref 1.7–7)
NEUTROPHILS NFR BLD AUTO: 72.2 % (ref 42.7–76)
NRBC BLD AUTO-RTO: 0 /100 WBC (ref 0–0.2)
PLATELET # BLD AUTO: 139 10*3/MM3 (ref 140–450)
PMV BLD AUTO: 7 FL (ref 6–12)
POTASSIUM SERPL-SCNC: 4.1 MMOL/L (ref 3.5–5.2)
RBC # BLD AUTO: 3.11 10*6/MM3 (ref 4.14–5.8)
SODIUM SERPL-SCNC: 136 MMOL/L (ref 136–145)
WBC NRBC COR # BLD: 5.9 10*3/MM3 (ref 3.4–10.8)

## 2023-08-04 PROCEDURE — 80048 BASIC METABOLIC PNL TOTAL CA: CPT | Performed by: EMERGENCY MEDICINE

## 2023-08-04 PROCEDURE — G0378 HOSPITAL OBSERVATION PER HR: HCPCS

## 2023-08-04 PROCEDURE — 63710000001 DIPHENHYDRAMINE PER 50 MG: Performed by: EMERGENCY MEDICINE

## 2023-08-04 PROCEDURE — 85025 COMPLETE CBC W/AUTO DIFF WBC: CPT | Performed by: EMERGENCY MEDICINE

## 2023-08-04 RX ORDER — DIPHENHYDRAMINE HCL 25 MG
50 CAPSULE ORAL NIGHTLY
Status: DISCONTINUED | OUTPATIENT
Start: 2023-08-04 | End: 2023-08-04 | Stop reason: HOSPADM

## 2023-08-04 RX ORDER — ACETAMINOPHEN 500 MG
1000 TABLET ORAL NIGHTLY
Status: DISCONTINUED | OUTPATIENT
Start: 2023-08-04 | End: 2023-08-04 | Stop reason: HOSPADM

## 2023-08-04 RX ADMIN — DIPHENHYDRAMINE HYDROCHLORIDE 50 MG: 25 CAPSULE ORAL at 00:16

## 2023-08-04 RX ADMIN — ACETAMINOPHEN 1000 MG: 500 TABLET, FILM COATED ORAL at 00:16

## 2023-08-04 RX ADMIN — Medication 10 ML: at 00:17

## 2023-08-04 NOTE — CONSULTS
Urology Consult Note    Patient:Yordy Hebert :1940  Room:[facility]  Admit Date8/3/2023  Age:82 y.o.     SEX:male     DOS:2023     MR:[id number]     Visit:[id number]       Attending: Christian Reardon MD  Referring Provider: Dr. Reardon  Reason for Consultation: Urinary retention    Patient Care Team:  Garima Jain MD as PCP - General (Family Medicine)  Raghav Doe MD as Consulting Physician (Cardiology)    Chief complaint gross hematuria    Subjective .     History of present illness: 82-year-old male who had undergone a transurethral resection of the prostate 3 days ago.  Patient was discharged home was doing well but developed gross hematuria and clot retention.  He presented to our continence center and a Taylor catheter was placed.  Several clots were removed.  However the patient was feeling poorly overall and was therefore advised to go to the hospital.  Patient is feeling much better at this point.  His urine is running essentially clear on minimal if any CBI.    Review of Systems  10 point review of systems were reviewed and are negative except for:  Constitution:  positive for See HPI    History  Past Medical History:   Diagnosis Date    Coronary artery disease     DJD (degenerative joint disease)     (L) hip    Family history of abdominal aortic aneurysm (AAA) repair 2012    Foot pain, left 2017    History of AAA (abdominal aortic aneurysm) repair     Hyperlipidemia     Hypertension     Inguinal hernia     right    Prostatitis      Past Surgical History:   Procedure Laterality Date    ABDOMINAL AORTIC ANEURYSM REPAIR      BACK SURGERY  2016    CARDIAC CATHETERIZATION      CATARACT EXTRACTION, BILATERAL      CORONARY ANGIOPLASTY WITH STENT PLACEMENT      INGUINAL HERNIA REPAIR      RENAL ARTERY STENT Left 2008     Social History     Socioeconomic History    Marital status:    Tobacco Use    Smoking status: Every Day     Packs/day: 0.25     Years: 62.00      Pack years: 15.50     Types: Cigarettes     Start date:      Passive exposure: Current    Smokeless tobacco: Never   Vaping Use    Vaping Use: Never used   Substance and Sexual Activity    Alcohol use: No    Drug use: Never    Sexual activity: Not Currently     Family History   Family history unknown: Yes     Allergy  No Known Allergies  Prior to Admission medications    Medication Sig Start Date End Date Taking? Authorizing Provider   aspirin (aspirin) 81 MG EC tablet Take 1 tablet by mouth Daily. 12  Yes Yevgeniy Mims MD   diphenhydrAMINE-acetaminophen (TYLENOL PM)  MG tablet per tablet Take 2 tablets by mouth At Night As Needed for Sleep.   Yes Yevgeniy Mims MD   finasteride (PROSCAR) 5 MG tablet Take 1 tablet by mouth Daily. 19  Yes Yevgeniy Mims MD   HYDROcodone-acetaminophen (NORCO)  MG per tablet Take 1 tablet by mouth Every 12 (Twelve) Hours As Needed. for pain 10/7/22  Yes Alphonse Haas MD   metoprolol succinate XL (TOPROL-XL) 25 MG 24 hr tablet Take 0.5 tablets by mouth Daily. 3/7/23  Yes Nadia Jalloh MD   pravastatin (PRAVACHOL) 80 MG tablet Take 1 tablet by mouth Every Night. 23  Yes Garima Jain MD   azithromycin (Zithromax Z-Kristopher) 250 MG tablet Take 2 tablets the first day, then 1 tablet daily for 4 days. 23   Jennifer Garner APRN   fexofenadine (Allegra Allergy) 180 MG tablet Take 1 tablet by mouth Daily.  Patient taking differently: Take 1 tablet by mouth As Needed. 21   Garima Jain MD   fluticasone (Flonase) 50 MCG/ACT nasal spray 2 sprays into the nostril(s) as directed by provider Daily. 23   Jennifer Garner APRN   gabapentin (NEURONTIN) 100 MG capsule Take 1 capsule by mouth 3 (Three) Times a Day. 10/24/22   Garima Jain MD   tamsulosin (FLOMAX) 0.4 MG capsule 24 hr capsule TAKE 1 CAPSULE DAILY 21   Garima Jain MD         Objective     tMax 24 hours:  Temp (24hrs), Av.3 °F  (36.8 °C), Min:97.7 °F (36.5 °C), Max:98.8 °F (37.1 °C)    Vital Sign Ranges:  Temp:  [97.7 °F (36.5 °C)-98.8 °F (37.1 °C)] 98 °F (36.7 °C)  Heart Rate:  [81-98] 85  Resp:  [17] 17  BP: ()/(50-69) 122/67  Intake and Output Last 3 Shifts:  I/O last 3 completed shifts:  In: 6000 [Other:6000]  Out: 6450 [Urine:6450]      Physical Exam:   General Appearance alert, appears stated age, and cooperative  Head normocephalic, without obvious abnormality and atraumatic  Abdomen soft non-tender, no guarding, and no rebound tenderness  Male Genitalia Taylor catheter with slightly discolored urine on minimal to no CBI  Skin no bleeding, bruising or rash  Neurologic Mental Status orientated to person, place, time and situation    Results Review:     Lab Results (last 24 hours)       Procedure Component Value Units Date/Time    Basic Metabolic Panel [[id number]]  (Normal) Collected: 08/04/23 0531    Specimen: Blood from Arm, Left Updated: 08/04/23 0614     Glucose 97 mg/dL      BUN 18 mg/dL      Creatinine 0.82 mg/dL      Sodium 136 mmol/L      Potassium 4.1 mmol/L      Chloride 98 mmol/L      CO2 29.0 mmol/L      Calcium 9.4 mg/dL      BUN/Creatinine Ratio 22.0     Anion Gap 9.0 mmol/L      eGFR 87.2 mL/min/1.73     Narrative:      GFR Normal >60  Chronic Kidney Disease <60  Kidney Failure <15    The GFR formula is only valid for adults with stable renal function between ages 18 and 70.    CBC Auto Differential [[id number]]  (Abnormal) Collected: 08/04/23 0531    Specimen: Blood from Arm, Left Updated: 08/04/23 0555     WBC 5.90 10*3/mm3      RBC 3.11 10*6/mm3      Hemoglobin 10.0 g/dL      Hematocrit 29.5 %      MCV 94.6 fL      MCH 32.1 pg      MCHC 34.0 g/dL      RDW 13.5 %      RDW-SD 44.2 fl      MPV 7.0 fL      Platelets 139 10*3/mm3      Neutrophil % 72.2 %      Lymphocyte % 10.2 %      Monocyte % 14.4 %      Eosinophil % 2.9 %      Basophil % 0.3 %      Neutrophils, Absolute 4.30 10*3/mm3      Lymphocytes, Absolute 0.60  10*3/mm3      Monocytes, Absolute 0.80 10*3/mm3      Eosinophils, Absolute 0.20 10*3/mm3      Basophils, Absolute 0.00 10*3/mm3      nRBC 0.0 /100 WBC     Basic Metabolic Panel [[id number]]  (Abnormal) Collected: 08/03/23 1704    Specimen: Blood Updated: 08/03/23 1744     Glucose 126 mg/dL      BUN 29 mg/dL      Creatinine 1.29 mg/dL      Sodium 131 mmol/L      Potassium 4.5 mmol/L      Chloride 92 mmol/L      CO2 27.0 mmol/L      Calcium 10.4 mg/dL      BUN/Creatinine Ratio 22.5     Anion Gap 12.0 mmol/L      eGFR 55.0 mL/min/1.73     Narrative:      GFR Normal >60  Chronic Kidney Disease <60  Kidney Failure <15    The GFR formula is only valid for adults with stable renal function between ages 18 and 70.    aPTT [[id number]]  (Abnormal) Collected: 08/03/23 1704    Specimen: Blood Updated: 08/03/23 1731     PTT 30.8 seconds     Protime-INR [[id number]]  (Normal) Collected: 08/03/23 1704    Specimen: Blood Updated: 08/03/23 1731     Protime 10.6 Seconds      INR 0.99    CBC & Differential [[id number]]  (Abnormal) Collected: 08/03/23 1704    Specimen: Blood Updated: 08/03/23 1719    Narrative:      The following orders were created for panel order CBC & Differential.  Procedure                               Abnormality         Status                     ---------                               -----------         ------                     CBC Auto Differential[[id number]]        Abnormal            Final result                 Please view results for these tests on the individual orders.    CBC Auto Differential [[id number]]  (Abnormal) Collected: 08/03/23 1704    Specimen: Blood Updated: 08/03/23 1719     WBC 8.50 10*3/mm3      RBC 3.65 10*6/mm3      Hemoglobin 11.3 g/dL      Hematocrit 33.9 %      MCV 92.9 fL      MCH 30.9 pg      MCHC 33.3 g/dL      RDW 13.1 %      RDW-SD 44.6 fl      MPV 7.0 fL      Platelets 146 10*3/mm3      Neutrophil % 86.0 %      Lymphocyte % 3.8 %      Monocyte % 9.9 %      Eosinophil % 0.1 %       Basophil % 0.2 %      Neutrophils, Absolute 7.30 10*3/mm3      Lymphocytes, Absolute 0.30 10*3/mm3      Monocytes, Absolute 0.80 10*3/mm3      Eosinophils, Absolute 0.00 10*3/mm3      Basophils, Absolute 0.00 10*3/mm3      nRBC 0.2 /100 WBC            No results found for: URINECX     Imaging Results (Last 7 Days)       ** No results found for the last 168 hours. **            Inpatient Meds:   Scheduled Meds:acetaminophen, 1,000 mg, Oral, Nightly   And  diphenhydrAMINE, 50 mg, Oral, Nightly  atorvastatin, 20 mg, Oral, Daily  finasteride, 5 mg, Oral, Daily  metoprolol succinate XL, 12.5 mg, Oral, Daily  senna-docusate sodium, 2 tablet, Oral, BID  sodium chloride, 10 mL, Intravenous, Q12H  tamsulosin, 0.4 mg, Oral, Daily       Continuous Infusions:    PRN Meds:.  acetaminophen    senna-docusate sodium **AND** polyethylene glycol **AND** bisacodyl **AND** bisacodyl    melatonin    ondansetron    [COMPLETED] Insert Peripheral IV **AND** sodium chloride    sodium chloride    sodium chloride      Assessment & Plan     Active Problems:    Hematuria    Gross hematuria and clot retention following transurethral resection of the prostate  Patient is doing much better now after placement of Taylor catheter and irrigation of clots    Plan  Okay for discharge from urology standpoint  Continue Taylor catheter  Patient to follow-up with a next week for voiding trial      I discussed the patient's findings and my recommendations with patient and nursing staff    Thank you for this  consult    Todd Perales MD  08/04/23  06:50 EDT

## 2023-08-04 NOTE — DISCHARGE SUMMARY
[facility]    Garima Jain MD    CHIEF COMPLAINT:     Gross Hematuria    HISTORY OF PRESENT ILLNESS:    Urinary Retention  Pertinent negatives include no fever, nausea or vomiting.   ED 08/03/2023  History of present illness: 82-year-old male who had undergone a transurethral resection of the prostate 3 days ago.  Patient was discharged home was doing well but developed gross hematuria and clot retention.  He presented to our continence center and a Taylor catheter was placed.  Several clots were removed.  However the patient was feeling poorly overall and was therefore advised to go to the hospital.  Patient is feeling much better at this point.  His urine is running essentially clear on minimal if any CBI.    Observation -08/04/2023  Patient agrees with HPI noted above including dysuria since waking up yesterday.  He does report intermittent hematuria for the past few days since and having procedure on Tuesday.  Patient denies any pain or acute distress since and having Taylor catheter placed and is eager for discharge this morning.  He states he already has an appointment with Dr. Perales in the week and is aware urology's   Recommendations to dc home today with urinary catheter and voiding trial outpatient next week.     Past Medical History:   Diagnosis Date    Coronary artery disease     DJD (degenerative joint disease)     (L) hip    Family history of abdominal aortic aneurysm (AAA) repair 1/31/2012    Foot pain, left 4/13/2017    History of AAA (abdominal aortic aneurysm) repair     Hyperlipidemia     Hypertension     Inguinal hernia     right    Prostatitis      Past Surgical History:   Procedure Laterality Date    ABDOMINAL AORTIC ANEURYSM REPAIR      BACK SURGERY  11/17/2016    CARDIAC CATHETERIZATION      CATARACT EXTRACTION, BILATERAL      CORONARY ANGIOPLASTY WITH STENT PLACEMENT      INGUINAL HERNIA REPAIR      RENAL ARTERY STENT Left 06/11/2008     Family History    Family history unknown: Yes     Social History     Tobacco Use    Smoking status: Every Day     Packs/day: 0.25     Years: 62.00     Pack years: 15.50     Types: Cigarettes     Start date: 1959     Passive exposure: Current    Smokeless tobacco: Never   Vaping Use    Vaping Use: Never used   Substance Use Topics    Alcohol use: No    Drug use: Never     Medications Prior to Admission   Medication Sig Dispense Refill Last Dose    aspirin (aspirin) 81 MG EC tablet Take 1 tablet by mouth Daily.   Past Week    diphenhydrAMINE-acetaminophen (TYLENOL PM)  MG tablet per tablet Take 2 tablets by mouth At Night As Needed for Sleep.   8/2/2023 at 2200    finasteride (PROSCAR) 5 MG tablet Take 1 tablet by mouth Daily.   Past Week    HYDROcodone-acetaminophen (NORCO)  MG per tablet Take 1 tablet by mouth Every 12 (Twelve) Hours As Needed. for pain   8/2/2023 at 2200    metoprolol succinate XL (TOPROL-XL) 25 MG 24 hr tablet Take 0.5 tablets by mouth Daily. 45 tablet 3 8/3/2023 at 0800    pravastatin (PRAVACHOL) 80 MG tablet Take 1 tablet by mouth Every Night. 90 tablet 1 8/2/2023 at 2200    azithromycin (Zithromax Z-Kristopher) 250 MG tablet Take 2 tablets the first day, then 1 tablet daily for 4 days. 6 tablet 0     fexofenadine (Allegra Allergy) 180 MG tablet Take 1 tablet by mouth Daily. (Patient taking differently: Take 1 tablet by mouth As Needed.) 90 tablet 3 More than a month    fluticasone (Flonase) 50 MCG/ACT nasal spray 2 sprays into the nostril(s) as directed by provider Daily. 18.2 mL 0 More than a month    gabapentin (NEURONTIN) 100 MG capsule Take 1 capsule by mouth 3 (Three) Times a Day. 90 capsule 1 Unknown    tamsulosin (FLOMAX) 0.4 MG capsule 24 hr capsule TAKE 1 CAPSULE DAILY 90 capsule 3 7/27/2023     Allergies:  Patient has no known allergies.    Immunization History   Administered Date(s) Administered    COVID-19 (MODERNA) 1st,2nd,3rd Dose Monovalent 02/04/2021, 03/04/2021    COVID-19 (MODERNA)  Monovalent Original Booster 12/15/2021    FLUAD TRI 65YR+ 10/23/2019    Flu Vaccine Split Quad 10/29/2021    Fluad Quad 65+ 10/20/2020    Fluzone High Dose =>65 Years ([employer] ONLY) 11/24/2015, 09/28/2016, 10/17/2018    Fluzone High-Dose 65+yrs 10/05/2022    Pneumococcal Conjugate 13-Valent (PCV13) 01/08/2018           REVIEW OF SYSTEMS:    Review of Systems   Constitutional: Negative for fever.   Gastrointestinal:  Negative for nausea and vomiting.   Genitourinary:  Positive for dysuria and hematuria.   All other systems reviewed and are negative.      Vital Signs  Temp:  [97.7 °F (36.5 °C)-98.8 °F (37.1 °C)] 98 °F (36.7 °C)  Heart Rate:  [81-98] 85  Resp:  [17] 17  BP: ()/(50-69) 122/67          Physical Exam:  Physical Exam  Vitals and nursing note reviewed.   Constitutional:       Appearance: Normal appearance.   HENT:      Head: Normocephalic and atraumatic.      Right Ear: External ear normal.      Left Ear: External ear normal.      Nose: Nose normal.      Mouth/Throat:      Mouth: Mucous membranes are moist.   Eyes:      Extraocular Movements: Extraocular movements intact.   Cardiovascular:      Rate and Rhythm: Normal rate and regular rhythm.      Pulses: Normal pulses.      Heart sounds: Normal heart sounds.   Pulmonary:      Effort: Pulmonary effort is normal.      Breath sounds: Normal breath sounds.   Abdominal:      General: Abdomen is flat. Bowel sounds are normal.      Palpations: Abdomen is soft.   Genitourinary:     Comments: Taylor catheter with dark yellow urine noted in bag.  Musculoskeletal:         General: Normal range of motion.      Cervical back: Normal range of motion.   Skin:     General: Skin is warm.   Neurological:      Mental Status: He is alert and oriented to person, place, and time.   Psychiatric:         Behavior: Behavior normal.         Thought Content: Thought content normal.         Judgment: Judgment normal.       Emotional Behavior:    Normal   Debilities:    None    Results Review:    I reviewed the patient's new clinical results.  Lab Results (most recent)       Procedure Component Value Units Date/Time    Basic Metabolic Panel [[id number]]  (Normal) Collected: 08/04/23 0531    Specimen: Blood from Arm, Left Updated: 08/04/23 0614     Glucose 97 mg/dL      BUN 18 mg/dL      Creatinine 0.82 mg/dL      Sodium 136 mmol/L      Potassium 4.1 mmol/L      Chloride 98 mmol/L      CO2 29.0 mmol/L      Calcium 9.4 mg/dL      BUN/Creatinine Ratio 22.0     Anion Gap 9.0 mmol/L      eGFR 87.2 mL/min/1.73     Narrative:      GFR Normal >60  Chronic Kidney Disease <60  Kidney Failure <15    The GFR formula is only valid for adults with stable renal function between ages 18 and 70.    CBC Auto Differential [[id number]]  (Abnormal) Collected: 08/04/23 0531    Specimen: Blood from Arm, Left Updated: 08/04/23 0555     WBC 5.90 10*3/mm3      RBC 3.11 10*6/mm3      Hemoglobin 10.0 g/dL      Hematocrit 29.5 %      MCV 94.6 fL      MCH 32.1 pg      MCHC 34.0 g/dL      RDW 13.5 %      RDW-SD 44.2 fl      MPV 7.0 fL      Platelets 139 10*3/mm3      Neutrophil % 72.2 %      Lymphocyte % 10.2 %      Monocyte % 14.4 %      Eosinophil % 2.9 %      Basophil % 0.3 %      Neutrophils, Absolute 4.30 10*3/mm3      Lymphocytes, Absolute 0.60 10*3/mm3      Monocytes, Absolute 0.80 10*3/mm3      Eosinophils, Absolute 0.20 10*3/mm3      Basophils, Absolute 0.00 10*3/mm3      nRBC 0.0 /100 WBC     Basic Metabolic Panel [[id number]]  (Abnormal) Collected: 08/03/23 1704    Specimen: Blood Updated: 08/03/23 1744     Glucose 126 mg/dL      BUN 29 mg/dL      Creatinine 1.29 mg/dL      Sodium 131 mmol/L      Potassium 4.5 mmol/L      Chloride 92 mmol/L      CO2 27.0 mmol/L      Calcium 10.4 mg/dL      BUN/Creatinine Ratio 22.5     Anion Gap 12.0 mmol/L      eGFR 55.0 mL/min/1.73     Narrative:      GFR Normal >60  Chronic Kidney Disease <60  Kidney Failure <15    The GFR formula is only valid for adults with stable  renal function between ages 18 and 70.    aPTT [[id number]]  (Abnormal) Collected: 08/03/23 1704    Specimen: Blood Updated: 08/03/23 1731     PTT 30.8 seconds     Protime-INR [[id number]]  (Normal) Collected: 08/03/23 1704    Specimen: Blood Updated: 08/03/23 1731     Protime 10.6 Seconds      INR 0.99    CBC & Differential [[id number]]  (Abnormal) Collected: 08/03/23 1704    Specimen: Blood Updated: 08/03/23 1719    Narrative:      The following orders were created for panel order CBC & Differential.  Procedure                               Abnormality         Status                     ---------                               -----------         ------                     CBC Auto Differential[[id number]]        Abnormal            Final result                 Please view results for these tests on the individual orders.    CBC Auto Differential [[id number]]  (Abnormal) Collected: 08/03/23 1704    Specimen: Blood Updated: 08/03/23 1719     WBC 8.50 10*3/mm3      RBC 3.65 10*6/mm3      Hemoglobin 11.3 g/dL      Hematocrit 33.9 %      MCV 92.9 fL      MCH 30.9 pg      MCHC 33.3 g/dL      RDW 13.1 %      RDW-SD 44.6 fl      MPV 7.0 fL      Platelets 146 10*3/mm3      Neutrophil % 86.0 %      Lymphocyte % 3.8 %      Monocyte % 9.9 %      Eosinophil % 0.1 %      Basophil % 0.2 %      Neutrophils, Absolute 7.30 10*3/mm3      Lymphocytes, Absolute 0.30 10*3/mm3      Monocytes, Absolute 0.80 10*3/mm3      Eosinophils, Absolute 0.00 10*3/mm3      Basophils, Absolute 0.00 10*3/mm3      nRBC 0.2 /100 WBC             Imaging Results (Most Recent)       None          reviewed    ECG/EMG Results (most recent)       None          reviewed            Microbiology Results (last 10 days)       ** No results found for the last 240 hours. **            Assessment & Plan     Hematuria       Hematuria   Lab Results   Component Value Date    WBC 5.90 08/04/2023    AST 20 09/21/2022    ALT 14 09/21/2022    ALKPHOS 93 09/21/2022    BILITOT 0.8  09/21/2022   -CBC showed mild anemia with hemoglobin at 10.0, CMP showed creatinine 1.29 at admission and unremarkable at discharge.  -In the ED Taylor catheter was placed and urology consulted  -Urology saw patient and recommended discharge today with Taylor catheter and voiding trial outpatient next week    Chronic pain  -On Norco    -Seasonal allergies  -Continue Flonase and Allegra    I discussed the patients findings and my recommendations with patient and nursing staff.     Discharge Diagnosis:      Hematuria      Hospital Course  Patient is a 82 y.o. male presented with dysuria and hematuria as noted in HPI above.  CMP unremarkable at discharge and CBC showed hemoglobin at 10.0.  In the ED Taylor catheter was placed with gross hematuria noted.  Urology was consulted and recommended discharge today with Taylor catheter and voiding trial next week in his office.  Patient aware of urologist recommendations and states he already has an appointment scheduled with Dr. Perales. At this time, patient felt to be in good condition for discharge with close follow up with urology. Instructed to take all home medications as prescribed and to return to ED if any concerning signs/symptoms. All test/lab results were discussed with patient. All questions were answered and patient verbalizes understanding.       Past Medical History:     Past Medical History:   Diagnosis Date    Coronary artery disease     DJD (degenerative joint disease)     (L) hip    Family history of abdominal aortic aneurysm (AAA) repair 1/31/2012    Foot pain, left 4/13/2017    History of AAA (abdominal aortic aneurysm) repair     Hyperlipidemia     Hypertension     Inguinal hernia     right    Prostatitis        Past Surgical History:     Past Surgical History:   Procedure Laterality Date    ABDOMINAL AORTIC ANEURYSM REPAIR      BACK SURGERY  11/17/2016    CARDIAC CATHETERIZATION      CATARACT EXTRACTION, BILATERAL      CORONARY ANGIOPLASTY WITH STENT  PLACEMENT      INGUINAL HERNIA REPAIR      RENAL ARTERY STENT Left 06/11/2008       Social History:   Social History     Socioeconomic History    Marital status:    Tobacco Use    Smoking status: Every Day     Packs/day: 0.25     Years: 62.00     Pack years: 15.50     Types: Cigarettes     Start date: 1959     Passive exposure: Current    Smokeless tobacco: Never   Vaping Use    Vaping Use: Never used   Substance and Sexual Activity    Alcohol use: No    Drug use: Never    Sexual activity: Not Currently       Procedures Performed         Consults:   Consults       Date and Time Order Name Status Description    8/3/2023  8:29 PM Inpatient Urology Consult Completed     8/3/2023  4:48 PM Urology (on-call MD unless specified)              Condition on Discharge:     Stable    Discharge Disposition      Discharge Medications     Discharge Medications        ASK your doctor about these medications        Instructions Start Date   aspirin 81 MG EC tablet   81 mg, Oral, Daily      azithromycin 250 MG tablet  Commonly known as: Zithromax Z-Kristopher   Take 2 tablets the first day, then 1 tablet daily for 4 days.      diphenhydrAMINE-acetaminophen  MG tablet per tablet  Commonly known as: TYLENOL PM   2 tablets, Oral, Nightly PRN      fexofenadine 180 MG tablet  Commonly known as: Allegra Allergy   180 mg, Oral, Daily      finasteride 5 MG tablet  Commonly known as: PROSCAR   5 mg, Oral, Daily      fluticasone 50 MCG/ACT nasal spray  Commonly known as: Flonase   2 sprays, Nasal, Daily      gabapentin 100 MG capsule  Commonly known as: NEURONTIN   100 mg, Oral, 3 Times Daily      HYDROcodone-acetaminophen  MG per tablet  Commonly known as: NORCO   1 tablet, Oral, Every 12 Hours PRN, for pain      metoprolol succinate XL 25 MG 24 hr tablet  Commonly known as: TOPROL-XL   12.5 mg, Oral, Daily      pravastatin 80 MG tablet  Commonly known as: PRAVACHOL   80 mg, Oral, Nightly      tamsulosin 0.4 MG capsule 24 hr  capsule  Commonly known as: FLOMAX   TAKE 1 CAPSULE DAILY               Discharge Diet:     Activity at Discharge:     Follow-up Appointments  Future Appointments   Date Time Provider Department Center   8/22/2023 10:30 AM [facility] MACHINE 2  [facility]   8/22/2023 11:15 AM ROOM 1,  [facility] None   9/15/2023  1:00 PM Garima Jain MD [facility]         Test Results Pending at Discharge       Risk for Readmission (LACE) Score: 2 (8/4/2023  6:00 AM)      Less Than 30 minutes spent in discharge activities for this patient    Monica Schuler, AMBER  08/04/23  09:10 EDT

## 2023-08-04 NOTE — PLAN OF CARE
Goal Outcome Evaluation:    Discharging home.  Self-care.  F/u appointments with urology and PCP.  Family to provide transport

## 2023-08-04 NOTE — PLAN OF CARE
Goal Outcome Evaluation:  Plan of Care Reviewed With: patient        Progress: improving  Outcome Evaluation: No acute events overnight. Catheter inflow port plugged this morning per Dr. Perales.

## 2023-08-07 ENCOUNTER — TRANSITIONAL CARE MANAGEMENT TELEPHONE ENCOUNTER (OUTPATIENT)
Dept: CALL CENTER | Facility: HOSPITAL | Age: 83
End: 2023-08-07
Payer: MEDICARE

## 2023-08-07 NOTE — OUTREACH NOTE
Call Center TCM Note      Flowsheet Row Responses   [facility] patient discharged from? [facility]   Does the patient have one of the following disease processes/diagnoses(primary or secondary)? Other   TCM attempt successful? Yes   Call start time 1625   Call end time 1627   Discharge diagnosis Gross Hematuria   Person spoke with today (if not patient) and relationship pt   Meds reviewed with patient/caregiver? Yes   Is the patient having any side effects they believe may be caused by any medication additions or changes? No   Does the patient have all medications ordered at discharge? N/A   Is the patient taking all medications as directed (includes completed medication regime)? Yes   Comments Urology appt 8/8/23   Does the patient have an appointment with their PCP within 7-14 days of discharge? No appointments available   Nursing Interventions Routed TCM call to PCP office   Psychosocial issues? No   Did the patient receive a copy of their discharge instructions? Yes   Nursing interventions Reviewed instructions with patient   What is the patient's perception of their health status since discharge? Improving   Is the patient/caregiver able to teach back signs and symptoms related to disease process for when to call PCP? Yes   Is the patient/caregiver able to teach back signs and symptoms related to disease process for when to call 911? Yes   Is the patient/caregiver able to teach back the hierarchy of who to call/visit for symptoms/problems? PCP, Specialist, Home health nurse, Urgent Care, ED, 911 Yes   If the patient is a current smoker, are they able to teach back resources for cessation? Not a smoker   TCM call completed? Yes   Wrap up additional comments Pt states he is doing alright, but not perfect. Reviewed AVS/meds with pt. Pt verified Urology fu appt on 8/8/23.   Call end time 1627   Would this patient benefit from a Referral to [facility] Social Work? No   Is the patient interested in additional calls from an  ambulatory ? No            Vianca Lee RN    8/7/2023, 16:28 EDT

## 2023-08-07 NOTE — CASE MANAGEMENT/SOCIAL WORK
Case Management Discharge Note      Final Note: home         Selected Continued Care - Discharged on 8/4/2023 Admission date: 8/3/2023 - Discharge disposition: Home or Self Care      Destination    No services have been selected for the patient.                Durable Medical Equipment    No services have been selected for the patient.                Dialysis/Infusion    No services have been selected for the patient.                Home Medical Care    No services have been selected for the patient.                Therapy    No services have been selected for the patient.                Community Resources    No services have been selected for the patient.                Community & DME    No services have been selected for the patient.                    Transportation Services  Private: Car    Final Discharge Disposition Code: 01 - home or self-care

## 2023-08-22 ENCOUNTER — HOSPITAL ENCOUNTER (OUTPATIENT)
Dept: CARDIOLOGY | Facility: HOSPITAL | Age: 83
Discharge: HOME | End: 2023-08-22
Payer: MEDICARE

## 2023-08-22 ENCOUNTER — APPOINTMENT (OUTPATIENT)
Dept: VASCULAR SURGERY | Facility: HOSPITAL | Age: 83
End: 2023-08-22
Payer: MEDICARE

## 2023-08-22 DIAGNOSIS — I73.9 PERIPHERAL VASCULAR DISEASE, UNSPECIFIED: ICD-10-CM

## 2023-08-22 LAB
BH CV LOWER ARTERIAL LEFT ABI RATIO: 1.32
BH CV LOWER ARTERIAL LEFT DORSALIS PEDIS SYS MAX: 150
BH CV LOWER ARTERIAL LEFT GREAT TOE SYS MAX: 60
BH CV LOWER ARTERIAL LEFT POST TIBIAL SYS MAX: 192
BH CV LOWER ARTERIAL LEFT TBI RATIO: 0.41
BH CV LOWER ARTERIAL RIGHT ABI RATIO: 1.27
BH CV LOWER ARTERIAL RIGHT DORSALIS PEDIS SYS MAX: 167
BH CV LOWER ARTERIAL RIGHT GREAT TOE SYS MAX: 59
BH CV LOWER ARTERIAL RIGHT POST TIBIAL SYS MAX: 185
BH CV LOWER ARTERIAL RIGHT TBI RATIO: 0.4
UPPER ARTERIAL LEFT ARM BRACHIAL SYS MAX: 139 MMHG
UPPER ARTERIAL RIGHT ARM BRACHIAL SYS MAX: 146 MMHG

## 2023-08-22 PROCEDURE — G0463 HOSPITAL OUTPT CLINIC VISIT: HCPCS

## 2023-08-22 PROCEDURE — 93922 UPR/L XTREMITY ART 2 LEVELS: CPT

## 2023-09-11 NOTE — PROGRESS NOTES
Encounter Date:09/12/2023        Patient ID: Yordy Hebert is a 82 y.o. male.      Chief Complaint:      History of Present Illness  82-year-old with history of CAD, hypertension, hyperlipidemia, PVD, AAA status postrepair who presents for follow-up.  He says he has been doing well from a cardiac perspective.  Denies any chest pain or shortness of breath.  No lower extremity edema or lightheadedness or dizziness.  No orthopnea or PND.  He has chronic back problems for which he gets pain shots occasionally and also takes hydrocodone.  He mentions he has had 2 stents placed in 2014 but he does not have a stent card with him today.  He has not had any repeated episodes of angina since that MI.  He denies any chest pain or shortness of breath.        EKG done in clinic today shows normal sinus rhythm, with occasional PVCs and left axis deviation.  Rate of 61 bpm.  No change compared to prior EKG.    The following portions of the patient's history were reviewed and updated as appropriate: allergies, current medications, past family history, past medical history, past social history, past surgical history, and problem list.    Review of Systems   Constitutional: Negative for malaise/fatigue.   Cardiovascular:  Negative for chest pain, dyspnea on exertion, leg swelling and palpitations.   Respiratory:  Negative for cough and shortness of breath.    Gastrointestinal:  Negative for abdominal pain, nausea and vomiting.   Neurological:  Negative for dizziness, focal weakness, headaches, light-headedness and numbness.   All other systems reviewed and are negative.      Current Outpatient Medications:     aspirin (aspirin) 81 MG EC tablet, Take 1 tablet by mouth Daily., Disp: , Rfl:     HYDROcodone-acetaminophen (NORCO)  MG per tablet, Take 1 tablet by mouth Every 12 (Twelve) Hours As Needed. for pain, Disp: , Rfl:     metoprolol succinate XL (TOPROL-XL) 25 MG 24 hr tablet, Take 0.5 tablets by mouth Daily., Disp: 45  "tablet, Rfl: 3    pravastatin (PRAVACHOL) 80 MG tablet, Take 1 tablet by mouth Every Night., Disp: 90 tablet, Rfl: 1    Current outpatient and discharge medications have been reconciled for the patient.  Reviewed by: Todd Boogie MD       No Known Allergies    Family History   Family history unknown: Yes       Past Surgical History:   Procedure Laterality Date    ABDOMINAL AORTIC ANEURYSM REPAIR      BACK SURGERY  11/17/2016    CARDIAC CATHETERIZATION      CATARACT EXTRACTION, BILATERAL      CORONARY ANGIOPLASTY WITH STENT PLACEMENT      INGUINAL HERNIA REPAIR      RENAL ARTERY STENT Left 06/11/2008       Past Medical History:   Diagnosis Date    Coronary artery disease     DJD (degenerative joint disease)     (L) hip    Family history of abdominal aortic aneurysm (AAA) repair 1/31/2012    Foot pain, left 4/13/2017    History of AAA (abdominal aortic aneurysm) repair     Hyperlipidemia     Hypertension     Inguinal hernia     right    Prostatitis        Family History   Family history unknown: Yes       Social History     Socioeconomic History    Marital status:    Tobacco Use    Smoking status: Every Day     Packs/day: 0.25     Years: 62.00     Pack years: 15.50     Types: Cigarettes     Start date: 1959     Passive exposure: Current    Smokeless tobacco: Never   Vaping Use    Vaping Use: Never used   Substance and Sexual Activity    Alcohol use: No    Drug use: Never    Sexual activity: Not Currently               Objective:       Physical Exam    /62 (BP Location: Left arm, Patient Position: Sitting, Cuff Size: Adult)   Pulse 61   Ht 175.3 cm (69\")   Wt 69.9 kg (154 lb)   SpO2 96%   BMI 22.74 kg/m²   The patient is alert, oriented and in no distress.    Vital signs as noted above.    Head and neck revealed no carotid bruits or jugular venous distension.  No thyromegaly or lymphadenopathy is present.    Lungs clear.  No wheezing.  Breath sounds are normal bilaterally.    Heart normal first and " second heart sounds.  Systolic ejectionmurmur..  No pericardial rub is present.  No gallop is present.    Abdomen soft and nontender.  No organomegaly is present.    Extremities revealed good peripheral pulses without any pedal edema.    Skin warm and dry.    Musculoskeletal system is grossly normal.    CNS grossly normal.           Diagnosis Plan   1. Essential hypertension        2. Mixed hyperlipidemia        3. Chronic coronary artery disease        4. Peripheral vascular disease, unspecified        5. History of BPH        6. Abdominal aortic aneurysm (AAA) without rupture, unspecified part        LAB RESULTS (LAST 7 DAYS)    CBC        BMP        CMP         BNP        TROPONIN        CoAg        Creatinine Clearance  CrCl cannot be calculated (Patient's most recent lab result is older than the maximum 30 days allowed.).    ABG        Radiology  No radiology results for the last day    EKG  Procedures    Stress test      Echocardiogram      Cardiac catheterization  No results found for this or any previous visit.          Assessment and Plan       Diagnoses and all orders for this visit:    1. Essential hypertension (Primary)    2. Mixed hyperlipidemia    3. Chronic coronary artery disease  Overview:  Had MI on 2014 - [facility] emergent cath - had 2 stents      4. Peripheral vascular disease, unspecified    5. History of BPH    6. Abdominal aortic aneurysm (AAA) without rupture, unspecified part  Overview:  Had AAA repaired in '08 with a sleeve - repeat CT in 9/19 - no evidence of any leaking.          Coronary artery disease  Patient reports previous PCI with 2 stents in 2014  Continue with aspirin, statin, metoprolol  Currently chest pain-free    Murmur  Follow-up on echocardiogram as he has a systolic ejection murmur.     Hypertension  Well-controlled  Currently only on metoprolol  Change metoprolol to XL     Hyperlipidemia  On pravastatin  LDL 79, HDL 54, triglyceride 92 and total cholesterol 150  Unable to  tolerate high intensity statin due to myalgias.    Peripheral arterial disease  AAA status post repair  CT scan shows CT shows aortobiiliac bypass graft appears patent without significant  stenosis.  Left greater than right SFA and popliteal artery disease, likely  hemodynamically significant on the left.  Moderate multifocal infrapopliteal disease with two-vessel runoff bilaterally  Bilateral ZARI normal  Focus on hypertension and heart rate control with beta-blocker  Continue statin    BPH   Continue tamsulosin

## 2023-09-12 ENCOUNTER — OFFICE VISIT (OUTPATIENT)
Dept: CARDIOLOGY | Facility: CLINIC | Age: 83
End: 2023-09-12
Payer: MEDICARE

## 2023-09-12 VITALS
BODY MASS INDEX: 22.81 KG/M2 | HEART RATE: 61 BPM | DIASTOLIC BLOOD PRESSURE: 62 MMHG | WEIGHT: 154 LBS | SYSTOLIC BLOOD PRESSURE: 110 MMHG | HEIGHT: 69 IN | OXYGEN SATURATION: 96 %

## 2023-09-12 DIAGNOSIS — I10 ESSENTIAL HYPERTENSION: Primary | ICD-10-CM

## 2023-09-12 DIAGNOSIS — E78.2 MIXED HYPERLIPIDEMIA: ICD-10-CM

## 2023-09-12 DIAGNOSIS — I73.9 PERIPHERAL VASCULAR DISEASE, UNSPECIFIED: ICD-10-CM

## 2023-09-12 DIAGNOSIS — I25.10 CHRONIC CORONARY ARTERY DISEASE: ICD-10-CM

## 2023-09-12 DIAGNOSIS — I71.40 ABDOMINAL AORTIC ANEURYSM (AAA) WITHOUT RUPTURE, UNSPECIFIED PART: ICD-10-CM

## 2023-09-12 DIAGNOSIS — Z87.438 HISTORY OF BPH: ICD-10-CM

## 2023-09-15 ENCOUNTER — OFFICE VISIT (OUTPATIENT)
Dept: FAMILY MEDICINE CLINIC | Facility: CLINIC | Age: 83
End: 2023-09-15
Payer: MEDICARE

## 2023-09-15 VITALS
HEART RATE: 61 BPM | HEIGHT: 69 IN | OXYGEN SATURATION: 94 % | WEIGHT: 154.4 LBS | SYSTOLIC BLOOD PRESSURE: 160 MMHG | RESPIRATION RATE: 18 BRPM | BODY MASS INDEX: 22.87 KG/M2 | DIASTOLIC BLOOD PRESSURE: 81 MMHG | TEMPERATURE: 96.4 F

## 2023-09-15 DIAGNOSIS — I10 ESSENTIAL HYPERTENSION: ICD-10-CM

## 2023-09-15 DIAGNOSIS — M54.41 CHRONIC MIDLINE LOW BACK PAIN WITH BILATERAL SCIATICA: ICD-10-CM

## 2023-09-15 DIAGNOSIS — I25.10 CHRONIC CORONARY ARTERY DISEASE: Primary | ICD-10-CM

## 2023-09-15 DIAGNOSIS — G89.29 CHRONIC MIDLINE LOW BACK PAIN WITH BILATERAL SCIATICA: ICD-10-CM

## 2023-09-15 DIAGNOSIS — R01.1 SYSTOLIC MURMUR: ICD-10-CM

## 2023-09-15 DIAGNOSIS — M54.42 CHRONIC MIDLINE LOW BACK PAIN WITH BILATERAL SCIATICA: ICD-10-CM

## 2023-09-15 DIAGNOSIS — E78.2 MIXED HYPERLIPIDEMIA: ICD-10-CM

## 2023-09-15 DIAGNOSIS — N40.1 BENIGN PROSTATIC HYPERPLASIA WITH URINARY OBSTRUCTION: ICD-10-CM

## 2023-09-15 DIAGNOSIS — Z12.5 ENCOUNTER FOR PROSTATE CANCER SCREENING: ICD-10-CM

## 2023-09-15 DIAGNOSIS — R30.0 DYSURIA: ICD-10-CM

## 2023-09-15 DIAGNOSIS — N13.8 BENIGN PROSTATIC HYPERPLASIA WITH URINARY OBSTRUCTION: ICD-10-CM

## 2023-09-15 LAB
BILIRUB BLD-MCNC: NEGATIVE MG/DL
CLARITY, POC: CLEAR
COLOR UR: YELLOW
EXPIRATION DATE: ABNORMAL
GLUCOSE UR STRIP-MCNC: ABNORMAL MG/DL
KETONES UR QL: NEGATIVE
LEUKOCYTE EST, POC: ABNORMAL
Lab: ABNORMAL
NITRITE UR-MCNC: NEGATIVE MG/ML
PH UR: 6.5 [PH] (ref 5–8)
PROT UR STRIP-MCNC: ABNORMAL MG/DL
RBC # UR STRIP: ABNORMAL /UL
SP GR UR: 1.01 (ref 1–1.03)
UROBILINOGEN UR QL: NORMAL

## 2023-09-15 PROCEDURE — 99214 OFFICE O/P EST MOD 30 MIN: CPT | Performed by: FAMILY MEDICINE

## 2023-09-15 PROCEDURE — 3079F DIAST BP 80-89 MM HG: CPT | Performed by: FAMILY MEDICINE

## 2023-09-15 PROCEDURE — 81003 URINALYSIS AUTO W/O SCOPE: CPT | Performed by: FAMILY MEDICINE

## 2023-09-15 PROCEDURE — 1159F MED LIST DOCD IN RCRD: CPT | Performed by: FAMILY MEDICINE

## 2023-09-15 PROCEDURE — 1160F RVW MEDS BY RX/DR IN RCRD: CPT | Performed by: FAMILY MEDICINE

## 2023-09-15 PROCEDURE — 3077F SYST BP >= 140 MM HG: CPT | Performed by: FAMILY MEDICINE

## 2023-09-15 NOTE — PROGRESS NOTES
Subjective   Yordy Hebert is a 82 y.o. male.   Chief Complaint   Patient presents with    Hypertension    Hyperlipidemia    Back Pain       History of Present Illness   82-year-old white male presents to the office today to follow-up on chronic medical problems per active problem list as below.    HTN-blood pressure is 160/81.  Forget metoprolol this AM.   Has murmur - Dr. Boogie has ordered echo.  CAD - asymptomatic now.     Since I last saw him he was hospitalized overnight for hematuria in early August.  Had TURP - followed by bleeding - had to go to ER.  Saw urology.  Told he had a UTI.  Was treated with antibiotics.  Feels still some burning when he pees.      Last lab work that I ordered was September 21 of last year.  I ordered blood work back in February and it was never done.    C/o back pain - sees Dr. Haas. On Norco.  Had shots - Not helping.  Has appt next month.  Back surgery   8 or 9 years ago by Dr. Domínguez.  Thinks he might want to see Dr. Domínguez again and he knows that Dr. Domínguez always wants an MRI.    Patient Active Problem List    Diagnosis Date Noted    Hematuria 08/03/2023    Calculus of gallbladder with chronic cholecystitis without obstruction 12/11/2022     Note Last Updated: 12/11/2022     Noted on U/S - 12/9/22.  Asymptomatic       Chronic midline low back pain with bilateral sciatica 09/21/2022    Left shoulder pain 01/08/2020    Left rotator cuff tear arthropathy 01/08/2020    Abdominal aortic aneurysm 11/01/2017     Note Last Updated: 1/30/2020     Had AAA repaired in '08 with a sleeve - repeat CT in 9/19 - no evidence of any leaking.       Presence of cardiac and vascular implant and graft 11/01/2017    Peroneal tendinitis 04/13/2017    Lumbar radiculopathy 06/02/2015     Note Last Updated: 11/13/2019     Had series of injections in his back in 2016.  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    Impaired fasting glucose 03/03/2015     Essential hypertension 2014    Chronic coronary artery disease 2014     Note Last Updated: 2019     Had MI on 2014 - hed emergent cath - had 2 stents      S/P AAA repair 2014    Carotid bruit 2013    Hay fever 2013    Inguinal hernia, right 2012    Arteriosclerotic vascular disease 2012    Benign prostatic hyperplasia with urinary obstruction 2012    Degenerative joint disease 2012    Inflammatory disease of prostate 2012    Systolic murmur 2012    Mixed hyperlipidemia 2011           Past Surgical History:   Procedure Laterality Date    ABDOMINAL AORTIC ANEURYSM REPAIR      BACK SURGERY  2016    CARDIAC CATHETERIZATION      CATARACT EXTRACTION, BILATERAL      CORONARY ANGIOPLASTY WITH STENT PLACEMENT      INGUINAL HERNIA REPAIR      PROSTATE SURGERY      RENAL ARTERY STENT Left 2008     Current Outpatient Medications on File Prior to Visit   Medication Sig    aspirin (aspirin) 81 MG EC tablet Take 1 tablet by mouth Daily.    HYDROcodone-acetaminophen (NORCO)  MG per tablet Take 1 tablet by mouth Every 12 (Twelve) Hours As Needed. for pain    metoprolol succinate XL (TOPROL-XL) 25 MG 24 hr tablet Take 0.5 tablets by mouth Daily.    pravastatin (PRAVACHOL) 80 MG tablet Take 1 tablet by mouth Every Night.     No current facility-administered medications on file prior to visit.     No Known Allergies  Social History     Socioeconomic History    Marital status:    Tobacco Use    Smoking status: Former     Packs/day: 0.25     Years: 62.00     Pack years: 15.50     Types: Cigarettes     Start date:      Quit date: 2023     Years since quittin.1     Passive exposure: Current    Smokeless tobacco: Never   Vaping Use    Vaping Use: Never used   Substance and Sexual Activity    Alcohol use: No    Drug use: Never    Sexual activity: Not Currently     Family History   Family history unknown: Yes       Review of  "Systems    Objective   /81 (BP Location: Right arm, Patient Position: Sitting, Cuff Size: Adult)   Pulse 61   Temp 96.4 °F (35.8 °C) (Infrared)   Resp 18   Ht 175.3 cm (69\")   Wt 70 kg (154 lb 6.4 oz)   SpO2 94%   BMI 22.80 kg/m²   Physical Exam  Constitutional:       Appearance: He is well-developed.      Comments:      HENT:      Head: Normocephalic and atraumatic.   Eyes:      Conjunctiva/sclera: Conjunctivae normal.   Cardiovascular:      Rate and Rhythm: Normal rate.      Heart sounds: Murmur heard.   Systolic murmur is present with a grade of 4/6.   Pulmonary:      Effort: Pulmonary effort is normal.   Musculoskeletal:         General: Normal range of motion.      Cervical back: Normal range of motion.      Right lower leg: No edema.      Left lower leg: No edema.   Skin:     General: Skin is warm and dry.      Findings: No rash.   Neurological:      Mental Status: He is alert and oriented to person, place, and time.   Psychiatric:         Behavior: Behavior normal.         Hospital Outpatient Visit on 08/22/2023   Component Date Value Ref Range Status    Upper arterial right arm brachial * 08/22/2023 146  mmHg Final    Upper arterial left arm brachial s* 08/22/2023 139  mmHg Final    RIGHT POST TIBIAL SYS MAX 08/22/2023 185   Final    LEFT POST TIBIAL SYS MAX 08/22/2023 192   Final    RIGHT DORSALIS PEDIS SYS MAX 08/22/2023 167   Final    LEFT DORSALIS PEDIS SYS MAX 08/22/2023 150   Final    RIGHT GREAT TOE SYS MAX 08/22/2023 59   Final    LEFT GREAT TOE SYS MAX 08/22/2023 60   Final    RIGHT ZARI RATIO 08/22/2023 1.27   Final    LEFT ZARI RATIO 08/22/2023 1.32   Final    RIGHT TBI RATIO 08/22/2023 0.4   Final    LEFT TBI RATIO 08/22/2023 0.41   Final   Admission on 08/03/2023, Discharged on 08/04/2023   Component Date Value Ref Range Status    Protime 08/03/2023 10.6  9.6 - 11.7 Seconds Final    INR 08/03/2023 0.99  0.93 - 1.10 Final    PTT 08/03/2023 30.8 (L)  61.0 - 76.5 seconds Final    Glucose " 08/03/2023 126 (H)  65 - 99 mg/dL Final    BUN 08/03/2023 29 (H)  8 - 23 mg/dL Final    Creatinine 08/03/2023 1.29 (H)  0.76 - 1.27 mg/dL Final    Sodium 08/03/2023 131 (L)  136 - 145 mmol/L Final    Potassium 08/03/2023 4.5  3.5 - 5.2 mmol/L Final    Chloride 08/03/2023 92 (L)  98 - 107 mmol/L Final    CO2 08/03/2023 27.0  22.0 - 29.0 mmol/L Final    Calcium 08/03/2023 10.4  8.6 - 10.5 mg/dL Final    BUN/Creatinine Ratio 08/03/2023 22.5  7.0 - 25.0 Final    Anion Gap 08/03/2023 12.0  5.0 - 15.0 mmol/L Final    eGFR 08/03/2023 55.0 (L)  >60.0 mL/min/1.73 Final    WBC 08/03/2023 8.50  3.40 - 10.80 10*3/mm3 Final    RBC 08/03/2023 3.65 (L)  4.14 - 5.80 10*6/mm3 Final    Hemoglobin 08/03/2023 11.3 (L)  13.0 - 17.7 g/dL Final    Hematocrit 08/03/2023 33.9 (L)  37.5 - 51.0 % Final    MCV 08/03/2023 92.9  79.0 - 97.0 fL Final    MCH 08/03/2023 30.9  26.6 - 33.0 pg Final    MCHC 08/03/2023 33.3  31.5 - 35.7 g/dL Final    RDW 08/03/2023 13.1  12.3 - 15.4 % Final    RDW-SD 08/03/2023 44.6  37.0 - 54.0 fl Final    MPV 08/03/2023 7.0  6.0 - 12.0 fL Final    Platelets 08/03/2023 146  140 - 450 10*3/mm3 Final    Neutrophil % 08/03/2023 86.0 (H)  42.7 - 76.0 % Final    Lymphocyte % 08/03/2023 3.8 (L)  19.6 - 45.3 % Final    Monocyte % 08/03/2023 9.9  5.0 - 12.0 % Final    Eosinophil % 08/03/2023 0.1 (L)  0.3 - 6.2 % Final    Basophil % 08/03/2023 0.2  0.0 - 1.5 % Final    Neutrophils, Absolute 08/03/2023 7.30 (H)  1.70 - 7.00 10*3/mm3 Final    Lymphocytes, Absolute 08/03/2023 0.30 (L)  0.70 - 3.10 10*3/mm3 Final    Monocytes, Absolute 08/03/2023 0.80  0.10 - 0.90 10*3/mm3 Final    Eosinophils, Absolute 08/03/2023 0.00  0.00 - 0.40 10*3/mm3 Final    Basophils, Absolute 08/03/2023 0.00  0.00 - 0.20 10*3/mm3 Final    nRBC 08/03/2023 0.2  0.0 - 0.2 /100 WBC Final    Glucose 08/04/2023 97  65 - 99 mg/dL Final    BUN 08/04/2023 18  8 - 23 mg/dL Final    Creatinine 08/04/2023 0.82  0.76 - 1.27 mg/dL Final    Sodium 08/04/2023 136  136 -  145 mmol/L Final    Potassium 08/04/2023 4.1  3.5 - 5.2 mmol/L Final    Chloride 08/04/2023 98  98 - 107 mmol/L Final    CO2 08/04/2023 29.0  22.0 - 29.0 mmol/L Final    Calcium 08/04/2023 9.4  8.6 - 10.5 mg/dL Final    BUN/Creatinine Ratio 08/04/2023 22.0  7.0 - 25.0 Final    Anion Gap 08/04/2023 9.0  5.0 - 15.0 mmol/L Final    eGFR 08/04/2023 87.2  >60.0 mL/min/1.73 Final    WBC 08/04/2023 5.90  3.40 - 10.80 10*3/mm3 Final    RBC 08/04/2023 3.11 (L)  4.14 - 5.80 10*6/mm3 Final    Hemoglobin 08/04/2023 10.0 (L)  13.0 - 17.7 g/dL Final    Hematocrit 08/04/2023 29.5 (L)  37.5 - 51.0 % Final    MCV 08/04/2023 94.6  79.0 - 97.0 fL Final    MCH 08/04/2023 32.1  26.6 - 33.0 pg Final    MCHC 08/04/2023 34.0  31.5 - 35.7 g/dL Final    RDW 08/04/2023 13.5  12.3 - 15.4 % Final    RDW-SD 08/04/2023 44.2  37.0 - 54.0 fl Final    MPV 08/04/2023 7.0  6.0 - 12.0 fL Final    Platelets 08/04/2023 139 (L)  140 - 450 10*3/mm3 Final    Neutrophil % 08/04/2023 72.2  42.7 - 76.0 % Final    Lymphocyte % 08/04/2023 10.2 (L)  19.6 - 45.3 % Final    Monocyte % 08/04/2023 14.4 (H)  5.0 - 12.0 % Final    Eosinophil % 08/04/2023 2.9  0.3 - 6.2 % Final    Basophil % 08/04/2023 0.3  0.0 - 1.5 % Final    Neutrophils, Absolute 08/04/2023 4.30  1.70 - 7.00 10*3/mm3 Final    Lymphocytes, Absolute 08/04/2023 0.60 (L)  0.70 - 3.10 10*3/mm3 Final    Monocytes, Absolute 08/04/2023 0.80  0.10 - 0.90 10*3/mm3 Final    Eosinophils, Absolute 08/04/2023 0.20  0.00 - 0.40 10*3/mm3 Final    Basophils, Absolute 08/04/2023 0.00  0.00 - 0.20 10*3/mm3 Final    nRBC 08/04/2023 0.0  0.0 - 0.2 /100 WBC Final   Lab Requisition on 08/01/2023   Component Date Value Ref Range Status    Case Report 08/01/2023    Final                    Value:Surgical Pathology Report                         Case: [id number]                                  Authorizing Provider:  Todd Perales MD       Collected:           08/01/2023 01:40 PM          Ordering Location:     [facility]       Received:            08/02/2023 11:38 AM                                 LABORATORY                                                                   Pathologist:           Steven Mcpherson MD                                                            Specimen:    Prostate                                                                                   Final Diagnosis 08/01/2023    Final                    Value:This result contains rich text formatting which cannot be displayed here.    Gross Description 08/01/2023    Final                    Value:This result contains rich text formatting which cannot be displayed here.   Hospital Outpatient Visit on 07/19/2023   Component Date Value Ref Range Status    QT Interval 07/19/2023 414  ms Final   Lab on 07/19/2023   Component Date Value Ref Range Status    Glucose 07/19/2023 92  65 - 99 mg/dL Final    BUN 07/19/2023 24 (H)  8 - 23 mg/dL Final    Creatinine 07/19/2023 0.93  0.76 - 1.27 mg/dL Final    Sodium 07/19/2023 139  136 - 145 mmol/L Final    Potassium 07/19/2023 4.2  3.5 - 5.2 mmol/L Final    Chloride 07/19/2023 99  98 - 107 mmol/L Final    CO2 07/19/2023 29.0  22.0 - 29.0 mmol/L Final    Calcium 07/19/2023 9.8  8.6 - 10.5 mg/dL Final    BUN/Creatinine Ratio 07/19/2023 25.8 (H)  7.0 - 25.0 Final    Anion Gap 07/19/2023 11.0  5.0 - 15.0 mmol/L Final    eGFR 07/19/2023 81.5  >60.0 mL/min/1.73 Final    WBC 07/19/2023 4.40  3.40 - 10.80 10*3/mm3 Final    RBC 07/19/2023 4.50  4.14 - 5.80 10*6/mm3 Final    Hemoglobin 07/19/2023 13.8  13.0 - 17.7 g/dL Final    Hematocrit 07/19/2023 41.1  37.5 - 51.0 % Final    MCV 07/19/2023 91.3  79.0 - 97.0 fL Final    MCH 07/19/2023 30.7  26.6 - 33.0 pg Final    MCHC 07/19/2023 33.6  31.5 - 35.7 g/dL Final    RDW 07/19/2023 12.3  12.3 - 15.4 % Final    RDW-SD 07/19/2023 40.3  37.0 - 54.0 fl Final    MPV 07/19/2023 8.8  6.0 - 12.0 fL Final    Platelets 07/19/2023 152  140 - 450 10*3/mm3 Final    Neutrophil %  07/19/2023 60.4  42.7 - 76.0 % Final    Lymphocyte % 07/19/2023 16.4 (L)  19.6 - 45.3 % Final    Monocyte % 07/19/2023 12.7 (H)  5.0 - 12.0 % Final    Eosinophil % 07/19/2023 9.3 (H)  0.3 - 6.2 % Final    Basophil % 07/19/2023 0.7  0.0 - 1.5 % Final    Immature Grans % 07/19/2023 0.5  0.0 - 0.5 % Final    Neutrophils, Absolute 07/19/2023 2.66  1.70 - 7.00 10*3/mm3 Final    Lymphocytes, Absolute 07/19/2023 0.72  0.70 - 3.10 10*3/mm3 Final    Monocytes, Absolute 07/19/2023 0.56  0.10 - 0.90 10*3/mm3 Final    Eosinophils, Absolute 07/19/2023 0.41 (H)  0.00 - 0.40 10*3/mm3 Final    Basophils, Absolute 07/19/2023 0.03  0.00 - 0.20 10*3/mm3 Final    Immature Grans, Absolute 07/19/2023 0.02  0.00 - 0.05 10*3/mm3 Final    nRBC 07/19/2023 0.0  0.0 - 0.2 /100 WBC Final       Office Visit on 09/15/2023   Component Date Value Ref Range Status    Color 09/15/2023 Yellow  Yellow, Straw, Dark Yellow, Nelia Final    Clarity, UA 09/15/2023 Clear  Clear Final    Specific Gravity  09/15/2023 1.015  1.005 - 1.030 Final    pH, Urine 09/15/2023 6.5  5.0 - 8.0 Final    Leukocytes 09/15/2023 Small (1+) (A)  Negative Final    Nitrite, UA 09/15/2023 Negative  Negative Final    Protein, POC 09/15/2023 Trace (A)  Negative mg/dL Final    Glucose, UA 09/15/2023 Trace (A)  Negative mg/dL Final    Ketones, UA 09/15/2023 Negative  Negative Final    Urobilinogen, UA 09/15/2023 Normal  Normal, 0.2 E.U./dL Final    Bilirubin 09/15/2023 Negative  Negative Final    Blood, UA 09/15/2023 1+ (A)  Negative Final    Lot Number 09/15/2023 [id number]   Final    Expiration Date 09/15/2023 10/25/2024   Final           Assessment & Plan   Diagnoses and all orders for this visit:    1. Chronic coronary artery disease (Primary)    2. Benign prostatic hyperplasia with urinary obstruction    3. Essential hypertension  -     Comprehensive Metabolic Panel; Future    4. Systolic murmur    5. Mixed hyperlipidemia  -     Lipid Panel; Future    6. Encounter for  prostate cancer screening  -     PSA Screen; Future    7. Dysuria    8. Chronic midline low back pain with bilateral sciatica    Status of multiple problems reviewed today.  Forgot his blood pressure medicine today.  Blood pressure is usually much better than it is.  We will get a urinalysis because of his complaint of ongoing dysuria.  Urinalysis reviewed with him as above.  We are going to send this for culture before making a decision regarding any antibiotic treatment since he did have surgery in the not too distant past.  Keep follow-up with urology regarding his BPH after his TURP.  He is off all his BPH medicines and able to urinate easily now.  Keep follow-up with Dr. Boogie regarding his murmur and coronary artery disease.  We will check a lipid panel and PSA in about 10 days.  It would have been over a year since he had those tests done.  Keep follow-up with his pain management doctor regarding his back pain.  If his pain management doctor who is treating his back pain thinks he needs to see a surgeon-he can make the referral.  He also would be the appropriate going to order an MRI if an MRI is needed.          Call with any problems or concerns before next visit       Return in about 6 months (around 3/15/2024).      Much of this report is an electronic transcription of spoken language to printed text using Dragon dictation software.  As such, the subtleties and finesse of spoken language may permit erroneous, or at times, nonsensical words or phrases to be inadvertently transcribed; thus changes may be made at a later date to rectify these errors.     Garima Jain [id number] EDT  This note has been electronically signed

## 2023-09-17 LAB
BACTERIA UR CULT: NO GROWTH
BACTERIA UR CULT: NORMAL

## 2023-09-18 ENCOUNTER — APPOINTMENT (OUTPATIENT)
Dept: GENERAL RADIOLOGY | Facility: HOSPITAL | Age: 83
End: 2023-09-18
Payer: MEDICARE

## 2023-09-18 ENCOUNTER — HOSPITAL ENCOUNTER (EMERGENCY)
Facility: HOSPITAL | Age: 83
Discharge: HOME | End: 2023-09-18
Attending: NURSE PRACTITIONER | Admitting: EMERGENCY MEDICINE
Payer: MEDICARE

## 2023-09-18 VITALS
TEMPERATURE: 98.3 F | RESPIRATION RATE: 20 BRPM | SYSTOLIC BLOOD PRESSURE: 157 MMHG | OXYGEN SATURATION: 97 % | DIASTOLIC BLOOD PRESSURE: 82 MMHG | HEART RATE: 68 BPM | HEIGHT: 69 IN | WEIGHT: 155.42 LBS | BODY MASS INDEX: 23.02 KG/M2

## 2023-09-18 DIAGNOSIS — S29.011A MUSCLE STRAIN OF CHEST WALL, INITIAL ENCOUNTER: Primary | ICD-10-CM

## 2023-09-18 LAB
ALBUMIN SERPL-MCNC: 4.2 G/DL (ref 3.5–5.2)
ALBUMIN/GLOB SERPL: 1.7 G/DL
ALP SERPL-CCNC: 108 U/L (ref 39–117)
ALT SERPL W P-5'-P-CCNC: 19 U/L (ref 1–41)
ANION GAP SERPL CALCULATED.3IONS-SCNC: 8 MMOL/L (ref 5–15)
AST SERPL-CCNC: 23 U/L (ref 1–40)
BASOPHILS # BLD AUTO: 0 10*3/MM3 (ref 0–0.2)
BASOPHILS NFR BLD AUTO: 0.5 % (ref 0–1.5)
BILIRUB SERPL-MCNC: 0.5 MG/DL (ref 0–1.2)
BUN SERPL-MCNC: 20 MG/DL (ref 8–23)
BUN/CREAT SERPL: 28.2 (ref 7–25)
CALCIUM SPEC-SCNC: 9.8 MG/DL (ref 8.6–10.5)
CHLORIDE SERPL-SCNC: 103 MMOL/L (ref 98–107)
CO2 SERPL-SCNC: 28 MMOL/L (ref 22–29)
CREAT SERPL-MCNC: 0.71 MG/DL (ref 0.76–1.27)
DEPRECATED RDW RBC AUTO: 46.8 FL (ref 37–54)
EGFRCR SERPLBLD CKD-EPI 2021: 91 ML/MIN/1.73
EOSINOPHIL # BLD AUTO: 0.5 10*3/MM3 (ref 0–0.4)
EOSINOPHIL NFR BLD AUTO: 9.1 % (ref 0.3–6.2)
ERYTHROCYTE [DISTWIDTH] IN BLOOD BY AUTOMATED COUNT: 14.3 % (ref 12.3–15.4)
GEN 5 2HR TROPONIN T REFLEX: 19 NG/L
GLOBULIN UR ELPH-MCNC: 2.5 GM/DL
GLUCOSE SERPL-MCNC: 98 MG/DL (ref 65–99)
HCT VFR BLD AUTO: 39.6 % (ref 37.5–51)
HGB BLD-MCNC: 12.8 G/DL (ref 13–17.7)
LYMPHOCYTES # BLD AUTO: 0.6 10*3/MM3 (ref 0.7–3.1)
LYMPHOCYTES NFR BLD AUTO: 11.9 % (ref 19.6–45.3)
MCH RBC QN AUTO: 30.7 PG (ref 26.6–33)
MCHC RBC AUTO-ENTMCNC: 32.4 G/DL (ref 31.5–35.7)
MCV RBC AUTO: 94.7 FL (ref 79–97)
MONOCYTES # BLD AUTO: 0.5 10*3/MM3 (ref 0.1–0.9)
MONOCYTES NFR BLD AUTO: 10.4 % (ref 5–12)
NEUTROPHILS NFR BLD AUTO: 3.5 10*3/MM3 (ref 1.7–7)
NEUTROPHILS NFR BLD AUTO: 68.1 % (ref 42.7–76)
NRBC BLD AUTO-RTO: 0.1 /100 WBC (ref 0–0.2)
PLATELET # BLD AUTO: 184 10*3/MM3 (ref 140–450)
PMV BLD AUTO: 6.8 FL (ref 6–12)
POTASSIUM SERPL-SCNC: 4.2 MMOL/L (ref 3.5–5.2)
PROT SERPL-MCNC: 6.7 G/DL (ref 6–8.5)
RBC # BLD AUTO: 4.18 10*6/MM3 (ref 4.14–5.8)
SODIUM SERPL-SCNC: 139 MMOL/L (ref 136–145)
TROPONIN T DELTA: 2 NG/L
TROPONIN T SERPL HS-MCNC: 17 NG/L
WBC NRBC COR # BLD: 5.2 10*3/MM3 (ref 3.4–10.8)
WHOLE BLOOD HOLD COAG: NORMAL
WHOLE BLOOD HOLD SPECIMEN: NORMAL

## 2023-09-18 PROCEDURE — 93005 ELECTROCARDIOGRAM TRACING: CPT

## 2023-09-18 PROCEDURE — 36415 COLL VENOUS BLD VENIPUNCTURE: CPT

## 2023-09-18 PROCEDURE — 85025 COMPLETE CBC W/AUTO DIFF WBC: CPT | Performed by: NURSE PRACTITIONER

## 2023-09-18 PROCEDURE — 99284 EMERGENCY DEPT VISIT MOD MDM: CPT

## 2023-09-18 PROCEDURE — 71045 X-RAY EXAM CHEST 1 VIEW: CPT

## 2023-09-18 PROCEDURE — 93005 ELECTROCARDIOGRAM TRACING: CPT | Performed by: NURSE PRACTITIONER

## 2023-09-18 PROCEDURE — 84484 ASSAY OF TROPONIN QUANT: CPT | Performed by: NURSE PRACTITIONER

## 2023-09-18 PROCEDURE — 80053 COMPREHEN METABOLIC PANEL: CPT | Performed by: NURSE PRACTITIONER

## 2023-09-18 RX ORDER — ASPIRIN 81 MG/1
324 TABLET, CHEWABLE ORAL ONCE
Status: COMPLETED | OUTPATIENT
Start: 2023-09-18 | End: 2023-09-18

## 2023-09-18 RX ORDER — SODIUM CHLORIDE 0.9 % (FLUSH) 0.9 %
10 SYRINGE (ML) INJECTION AS NEEDED
Status: DISCONTINUED | OUTPATIENT
Start: 2023-09-18 | End: 2023-09-18 | Stop reason: HOSPADM

## 2023-09-18 RX ADMIN — ASPIRIN 324 MG: 81 TABLET, CHEWABLE ORAL at 15:58

## 2023-09-18 NOTE — DISCHARGE INSTRUCTIONS
Tylenol as needed for pain.    Follow-up with your primary care provider in 3-5 days.  If you do not have a primary care provider call [phone] for help in finding one, or you may follow up with [facility] at [phone].    Return to ED for any new or worsening symptoms

## 2023-09-18 NOTE — ED PROVIDER NOTES
Subjective   History of Present Illness  Yordy Hebert is a 82 y.o. male  presents with 3 day history of elevated blood pressure. He also reports left upper chest pain that is worse with palpation and weight bearing of the left hand. Denies fever, sweats, chills. Denies dyspnea. Denies cough. He does report stopping smoking and using Nicotine. History of CAD with stents.     Cardiologist: Dr. Doe      History provided by:  Patient    Review of Systems    Past Medical History:   Diagnosis Date    Coronary artery disease     DJD (degenerative joint disease)     (L) hip    Family history of abdominal aortic aneurysm (AAA) repair 2012    Foot pain, left 2017    History of AAA (abdominal aortic aneurysm) repair     Hyperlipidemia     Hypertension     Inguinal hernia     right    Prostatitis        No Known Allergies    Past Surgical History:   Procedure Laterality Date    ABDOMINAL AORTIC ANEURYSM REPAIR      BACK SURGERY  2016    CARDIAC CATHETERIZATION      CATARACT EXTRACTION, BILATERAL      CORONARY ANGIOPLASTY WITH STENT PLACEMENT      INGUINAL HERNIA REPAIR      PROSTATE SURGERY      RENAL ARTERY STENT Left 2008       Family History   Family history unknown: Yes       Social History     Socioeconomic History    Marital status:    Tobacco Use    Smoking status: Former     Packs/day: 0.25     Years: 62.00     Pack years: 15.50     Types: Cigarettes     Start date:      Quit date: 2023     Years since quittin.1     Passive exposure: Current    Smokeless tobacco: Never   Vaping Use    Vaping Use: Never used   Substance and Sexual Activity    Alcohol use: No    Drug use: Never    Sexual activity: Not Currently           Objective   Physical Exam  Vitals and nursing note reviewed.   Constitutional:       General: He is awake. He is not in acute distress.     Appearance: Normal appearance. He is well-developed and normal weight. He is not ill-appearing, toxic-appearing or  diaphoretic.   HENT:      Head: Normocephalic and atraumatic.   Eyes:      Extraocular Movements: Extraocular movements intact.      Pupils: Pupils are equal, round, and reactive to light.   Neck:      Thyroid: No thyromegaly.      Vascular: No JVD.      Trachea: No tracheal deviation.   Cardiovascular:      Rate and Rhythm: Normal rate and regular rhythm.      Pulses: Normal pulses.           Radial pulses are 2+ on the right side and 2+ on the left side.        Dorsalis pedis pulses are 2+ on the right side and 2+ on the left side.        Posterior tibial pulses are 2+ on the right side and 2+ on the left side.      Heart sounds: S1 normal and S2 normal. Heart sounds not distant. Murmur heard.   Systolic murmur is present with a grade of 4/6.     No friction rub. No gallop.   Pulmonary:      Effort: Pulmonary effort is normal. No respiratory distress.      Breath sounds: Normal breath sounds and air entry. No wheezing or rales.   Chest:      Chest wall: Tenderness present. No crepitus or edema.          Comments: No overlying erythema or ecchymosis.  Abdominal:      General: Bowel sounds are normal. There is no distension.      Palpations: Abdomen is soft. Abdomen is not rigid. There is no mass.      Tenderness: There is no abdominal tenderness. There is no guarding or rebound.      Hernia: No hernia is present.   Musculoskeletal:         General: No swelling or tenderness. Normal range of motion.      Cervical back: Normal range of motion and neck supple.      Right lower leg: No edema.      Left lower leg: No edema.   Skin:     General: Skin is warm and dry.      Capillary Refill: Capillary refill takes less than 2 seconds.      Coloration: Skin is not pale.      Findings: No bruising or rash.   Neurological:      Mental Status: He is alert and oriented to person, place, and time.      GCS: GCS eye subscore is 4. GCS verbal subscore is 5. GCS motor subscore is 6.   Psychiatric:         Mood and Affect: Mood  "normal.         Behavior: Behavior normal. Behavior is cooperative.         Thought Content: Thought content normal.         Judgment: Judgment normal.       ECG 12 Lead      Date/Time: 9/18/2023 2:05 PM  Performed by: Johanna Kowalski APRN  Authorized by: Francisco Javier Reardon MD   Interpreted by physician  Comparison: compared with previous ECG from 7/19/2023  Comparison to previous ECG: Sinus rhythm right bundle branch block left anterior fascicular block, significant axis.  Rate is 72.  Rhythm: sinus rhythm  Ectopy: PVCs  Conduction: complete RBBB  Clinical impression: abnormal ECG             ED Course  ED Course as of 09/18/23 1656   Mon Sep 18, 2023   1639 Patient care transferred to me pending repeat troponin and disposition [AA]      ED Course User Index  [AA] Harvey Obregon PA                                           Medical Decision Making  Problems Addressed:  Muscle strain of chest wall, initial encounter: complicated acute illness or injury    Amount and/or Complexity of Data Reviewed  Labs: ordered.  Radiology: ordered.  ECG/medicine tests: ordered.    Risk  OTC drugs.  Prescription drug management.    Interpreted by radiologist as below:     XR Chest 1 View    Result Date: 9/18/2023  Impression: No acute abnormality. Electronically Signed: Jose A Woodall DO  9/18/2023 4:01 PM EDT  Workstation ID: [id number]       /82   Pulse 68   Temp 98.3 °F (36.8 °C) (Oral)   Resp 20   Ht 175.3 cm (69\")   Wt 70.5 kg (155 lb 6.8 oz)   SpO2 97%   BMI 22.95 kg/m²      Lab Results (last 24 hours)       Procedure Component Value Units Date/Time    CBC & Differential [[id number]]  (Abnormal) Collected: 09/18/23 1600    Specimen: Blood Updated: 09/18/23 1613    Narrative:      The following orders were created for panel order CBC & Differential.  Procedure                               Abnormality         Status                     ---------                               -----------         ------            "          CBC Auto Differential[[id number]]        Abnormal            Final result                 Please view results for these tests on the individual orders.    Comprehensive Metabolic Panel [[id number]]  (Abnormal) Collected: 09/18/23 1600    Specimen: Blood Updated: 09/18/23 1633     Glucose 98 mg/dL      BUN 20 mg/dL      Creatinine 0.71 mg/dL      Sodium 139 mmol/L      Potassium 4.2 mmol/L      Chloride 103 mmol/L      CO2 28.0 mmol/L      Calcium 9.8 mg/dL      Total Protein 6.7 g/dL      Albumin 4.2 g/dL      ALT (SGPT) 19 U/L      AST (SGOT) 23 U/L      Alkaline Phosphatase 108 U/L      Total Bilirubin 0.5 mg/dL      Globulin 2.5 gm/dL      A/G Ratio 1.7 g/dL      BUN/Creatinine Ratio 28.2     Anion Gap 8.0 mmol/L      eGFR 91.0 mL/min/1.73     Narrative:      GFR Normal >60  Chronic Kidney Disease <60  Kidney Failure <15    The GFR formula is only valid for adults with stable renal function between ages 18 and 70.    High Sensitivity Troponin T [[id number]]  (Abnormal) Collected: 09/18/23 1600    Specimen: Blood Updated: 09/18/23 1633     HS Troponin T 17 ng/L     Narrative:      High Sensitive Troponin T Reference Range:  <10.0 ng/L- Negative Female for AMI  <15.0 ng/L- Negative Male for AMI  >=10 - Abnormal Female indicating possible myocardial injury.  >=15 - Abnormal Male indicating possible myocardial injury.   Clinicians would have to utilize clinical acumen, EKG, Troponin, and serial changes to determine if it is an Acute Myocardial Infarction or myocardial injury due to an underlying chronic condition.         CBC Auto Differential [[id number]]  (Abnormal) Collected: 09/18/23 1600    Specimen: Blood Updated: 09/18/23 1613     WBC 5.20 10*3/mm3      RBC 4.18 10*6/mm3      Hemoglobin 12.8 g/dL      Hematocrit 39.6 %      MCV 94.7 fL      MCH 30.7 pg      MCHC 32.4 g/dL      RDW 14.3 %      RDW-SD 46.8 fl      MPV 6.8 fL      Platelets 184 10*3/mm3      Neutrophil % 68.1 %      Lymphocyte % 11.9 %       Monocyte % 10.4 %      Eosinophil % 9.1 %      Basophil % 0.5 %      Neutrophils, Absolute 3.50 10*3/mm3      Lymphocytes, Absolute 0.60 10*3/mm3      Monocytes, Absolute 0.50 10*3/mm3      Eosinophils, Absolute 0.50 10*3/mm3      Basophils, Absolute 0.00 10*3/mm3      nRBC 0.1 /100 WBC              Medications   sodium chloride 0.9 % flush 10 mL (has no administration in time range)   aspirin chewable tablet 324 mg (324 mg Oral Given 9/18/23 1558)        Patient undressed and placed in gown for exam.  Appropriate PPE worn during patient exam.  Appropriate monitoring initiated. Patient is alert and oriented x3.  No acute distress noted. Patient is alert and oriented x3. No acute distress.  S1-S2 heart sounds on exam.  Lungs are clear to auscultation. No edema noted to the bilateral lower extremities.  IV established and labs obtained.  Cardiac work-up initiated.  Chest x-ray obtained with the above findings.    I reviewed chart 9/15/2023 patient was seen by primary care for wellness visit. My radiology interpretation of chest x-ray showed no cardiomegaly, pulmonary edema, infiltrate. Differential Diagnoses, not all-inclusive and does not constitute entirety of all causes: STEMI, non-STEMI, chest wall strain.    Disposition/Treatment:  Patient was stable upon transfer care to [facility].     Part of this note may be an electronic transcription/translation of spoken language to printed text using the Dragon Dictation System.       Final diagnoses:   Muscle strain of chest wall, initial encounter       ED Disposition  ED Disposition       None            No follow-up provider specified.       Medication List      No changes were made to your prescriptions during this visit.            Johanna Kowalski APRN  09/18/23 1657       Johanna Kowalski, AMBER  09/18/23 1656

## 2023-09-19 LAB
QT INTERVAL: 414 MS
QTC INTERVAL: 483 MS

## 2023-10-24 ENCOUNTER — OUTSIDE FACILITY SERVICE (OUTPATIENT)
Dept: CARDIOLOGY | Facility: CLINIC | Age: 83
End: 2023-10-24
Payer: MEDICARE

## 2023-10-26 ENCOUNTER — TELEPHONE (OUTPATIENT)
Dept: CARDIOLOGY | Facility: HOSPITAL | Age: 83
End: 2023-10-26

## 2023-10-26 DIAGNOSIS — I35.0 AORTIC STENOSIS, SEVERE: Primary | ICD-10-CM

## 2023-10-26 NOTE — TELEPHONE ENCOUNTER
Telephoned patient for an introduction to the structural heart team.  Education given on aortic stenosis and procedures available for treatment.  Educational handouts including a shared decision making pamphlet and contact information mailed to patient's home address.  I will continue to follow and arrange further workup as needed.  Appointment scheduled with Dr. Boogie in [place] on 11/1/23.

## 2023-10-29 NOTE — H&P (VIEW-ONLY)
Encounter Date:11/01/2023        Patient ID: Yordy Hebert is a 82 y.o. male.      Chief Complaint:      History of Present Illness  82-year-old with history of CAD, hypertension, hyperlipidemia, PVD, AAA status postrepair who presents for follow-up.   He has chronic back problems for which he gets pain shots occasionally and also takes hydrocodone.  He mentions he has had 2 stents placed in 2014 but he does not have a stent card with him today.  He has not had any repeated episodes of angina since that MI.  An echocardiogram has shown severe aortic stenosis with mean gradient of 50 mmHg. Today he wants to discuss therapeutic options for severe aortic stenosis.        EKG done in clinic today shows normal sinus rhythm, with occasional PVCs and left axis deviation.  Rate of 61 bpm.  No change compared to prior EKG.    The following portions of the patient's history were reviewed and updated as appropriate: allergies, current medications, past family history, past medical history, past social history, past surgical history, and problem list.    Review of Systems   Constitutional: Negative for malaise/fatigue.   Cardiovascular:  Positive for chest pain. Negative for dyspnea on exertion, leg swelling and palpitations.   Respiratory:  Positive for shortness of breath. Negative for cough.    Gastrointestinal:  Negative for abdominal pain, nausea and vomiting.   Neurological:  Negative for dizziness, focal weakness, headaches, light-headedness and numbness.   All other systems reviewed and are negative.        Current Outpatient Medications:     aspirin (aspirin) 81 MG EC tablet, Take 1 tablet by mouth Daily., Disp: , Rfl:     HYDROcodone-acetaminophen (NORCO)  MG per tablet, Take 1 tablet by mouth Every 12 (Twelve) Hours As Needed. for pain, Disp: , Rfl:     metoprolol succinate XL (TOPROL-XL) 25 MG 24 hr tablet, Take 0.5 tablets by mouth Daily., Disp: 45 tablet, Rfl: 3    pravastatin (PRAVACHOL) 80 MG tablet, Take  "1 tablet by mouth Every Night., Disp: 90 tablet, Rfl: 1    Current outpatient and discharge medications have been reconciled for the patient.  Reviewed by: Todd Boogie MD       No Known Allergies    Family History   Family history unknown: Yes       Past Surgical History:   Procedure Laterality Date    ABDOMINAL AORTIC ANEURYSM REPAIR      BACK SURGERY  2016    CARDIAC CATHETERIZATION      CATARACT EXTRACTION, BILATERAL      CORONARY ANGIOPLASTY WITH STENT PLACEMENT      INGUINAL HERNIA REPAIR      PROSTATE SURGERY      RENAL ARTERY STENT Left 2008       Past Medical History:   Diagnosis Date    Coronary artery disease     DJD (degenerative joint disease)     (L) hip    Family history of abdominal aortic aneurysm (AAA) repair 2012    Foot pain, left 2017    History of AAA (abdominal aortic aneurysm) repair     Hyperlipidemia     Hypertension     Inguinal hernia     right    Prostatitis        Family History   Family history unknown: Yes       Social History     Socioeconomic History    Marital status:    Tobacco Use    Smoking status: Former     Packs/day: 0.25     Years: 62.00     Additional pack years: 0.00     Total pack years: 15.50     Types: Cigarettes     Start date:      Quit date: 2023     Years since quittin.2     Passive exposure: Current    Smokeless tobacco: Never   Vaping Use    Vaping Use: Never used   Substance and Sexual Activity    Alcohol use: No    Drug use: Never    Sexual activity: Not Currently               Objective:       Physical Exam    /84 (BP Location: Left arm, Patient Position: Sitting, Cuff Size: Adult)   Pulse 74   Ht 175.3 cm (69\")   Wt 70.3 kg (155 lb)   SpO2 96%   BMI 22.89 kg/m²   The patient is alert, oriented and in no distress.    Vital signs as noted above.    Head and neck revealed no carotid bruits or jugular venous distension.  No thyromegaly or lymphadenopathy is present.    Lungs clear.  No wheezing.  Breath sounds " are normal bilaterally.    Heart normal first and second heart sounds.  Late peaking systolic ejectionmurmur..  No pericardial rub is present.  No gallop is present.    Abdomen soft and nontender.  No organomegaly is present.    Extremities revealed good peripheral pulses without any pedal edema.    Skin warm and dry.    Musculoskeletal system is grossly normal.    CNS grossly normal.           Diagnosis Plan   1. Aortic stenosis, severe        2. Essential hypertension        3. Mixed hyperlipidemia        4. Chronic coronary artery disease        5. Peripheral vascular disease, unspecified        6. History of BPH        7. Abdominal aortic aneurysm (AAA) without rupture, unspecified part        8. Pure hypercholesterolemia        LAB RESULTS (LAST 7 DAYS)    CBC        BMP        CMP         BNP        TROPONIN        CoAg        Creatinine Clearance  Estimated Creatinine Clearance: 68.7 mL/min (by C-G formula based on SCr of 0.81 mg/dL).    ABG        Radiology  No radiology results for the last day    EKG  Procedures    Stress test      Echocardiogram      Cardiac catheterization  No results found for this or any previous visit.          Assessment and Plan       Diagnoses and all orders for this visit:    1. Aortic stenosis, severe (Primary)    2. Essential hypertension    3. Mixed hyperlipidemia    4. Chronic coronary artery disease  Overview:  Had MI on 2014 - [facility] emergent cath - had 2 stents      5. Peripheral vascular disease, unspecified    6. History of BPH    7. Abdominal aortic aneurysm (AAA) without rupture, unspecified part  Overview:  Had AAA repaired in '08 with a sleeve - repeat CT in 9/19 - no evidence of any leaking.       8. Pure hypercholesterolemia       Severe aortic stenosis  Echocardiogram shows EF of 55 to 60%, grade 1 diastolic dysfunction and severe aortic stenosis with aortic valve area 0.7 cm² and mean/peak gradient of 50/75 mmHg.  I have explained the natural history and therapeutic  options to the patient.  I have also described TAVR in details using a heart model.  I have described the work-up that he will require prior to getting a TAVR.  We will proceed with cardiac catheterization and TAVR CTA.    Coronary artery disease  Patient reports previous PCI with 2 stents in 2014  Continue with aspirin, statin, metoprolol  Currently chest pain-free     Hypertension  Well-controlled  Currently only on metoprolol  Changed metoprolol to XL     Hyperlipidemia  On pravastatin  LDL 79, HDL 54, triglyceride 92 and total cholesterol 150  Unable to tolerate high intensity statin due to myalgias.     Peripheral arterial disease  AAA status post repair  CT scan shows CT shows aortobiiliac bypass graft appears patent without significant  stenosis.  Left greater than right SFA and popliteal artery disease, likely  hemodynamically significant on the left.  Moderate multifocal infrapopliteal disease with two-vessel runoff bilaterally  Bilateral ZARI normal  Focus on hypertension and heart rate control with beta-blocker  Continue statin     BPH   Continue tamsulosin

## 2023-10-29 NOTE — PROGRESS NOTES
Encounter Date:11/01/2023        Patient ID: Yordy Hebert is a 82 y.o. male.      Chief Complaint:      History of Present Illness  82-year-old with history of CAD, hypertension, hyperlipidemia, PVD, AAA status postrepair who presents for follow-up.   He has chronic back problems for which he gets pain shots occasionally and also takes hydrocodone.  He mentions he has had 2 stents placed in 2014 but he does not have a stent card with him today.  He has not had any repeated episodes of angina since that MI.  An echocardiogram has shown severe aortic stenosis with mean gradient of 50 mmHg. Today he wants to discuss therapeutic options for severe aortic stenosis.        EKG done in clinic today shows normal sinus rhythm, with occasional PVCs and left axis deviation.  Rate of 61 bpm.  No change compared to prior EKG.    The following portions of the patient's history were reviewed and updated as appropriate: allergies, current medications, past family history, past medical history, past social history, past surgical history, and problem list.    Review of Systems   Constitutional: Negative for malaise/fatigue.   Cardiovascular:  Positive for chest pain. Negative for dyspnea on exertion, leg swelling and palpitations.   Respiratory:  Positive for shortness of breath. Negative for cough.    Gastrointestinal:  Negative for abdominal pain, nausea and vomiting.   Neurological:  Negative for dizziness, focal weakness, headaches, light-headedness and numbness.   All other systems reviewed and are negative.        Current Outpatient Medications:     aspirin (aspirin) 81 MG EC tablet, Take 1 tablet by mouth Daily., Disp: , Rfl:     HYDROcodone-acetaminophen (NORCO)  MG per tablet, Take 1 tablet by mouth Every 12 (Twelve) Hours As Needed. for pain, Disp: , Rfl:     metoprolol succinate XL (TOPROL-XL) 25 MG 24 hr tablet, Take 0.5 tablets by mouth Daily., Disp: 45 tablet, Rfl: 3    pravastatin (PRAVACHOL) 80 MG tablet, Take  "1 tablet by mouth Every Night., Disp: 90 tablet, Rfl: 1    Current outpatient and discharge medications have been reconciled for the patient.  Reviewed by: Todd Boogie MD       No Known Allergies    Family History   Family history unknown: Yes       Past Surgical History:   Procedure Laterality Date    ABDOMINAL AORTIC ANEURYSM REPAIR      BACK SURGERY  2016    CARDIAC CATHETERIZATION      CATARACT EXTRACTION, BILATERAL      CORONARY ANGIOPLASTY WITH STENT PLACEMENT      INGUINAL HERNIA REPAIR      PROSTATE SURGERY      RENAL ARTERY STENT Left 2008       Past Medical History:   Diagnosis Date    Coronary artery disease     DJD (degenerative joint disease)     (L) hip    Family history of abdominal aortic aneurysm (AAA) repair 2012    Foot pain, left 2017    History of AAA (abdominal aortic aneurysm) repair     Hyperlipidemia     Hypertension     Inguinal hernia     right    Prostatitis        Family History   Family history unknown: Yes       Social History     Socioeconomic History    Marital status:    Tobacco Use    Smoking status: Former     Packs/day: 0.25     Years: 62.00     Additional pack years: 0.00     Total pack years: 15.50     Types: Cigarettes     Start date:      Quit date: 2023     Years since quittin.2     Passive exposure: Current    Smokeless tobacco: Never   Vaping Use    Vaping Use: Never used   Substance and Sexual Activity    Alcohol use: No    Drug use: Never    Sexual activity: Not Currently               Objective:       Physical Exam    /84 (BP Location: Left arm, Patient Position: Sitting, Cuff Size: Adult)   Pulse 74   Ht 175.3 cm (69\")   Wt 70.3 kg (155 lb)   SpO2 96%   BMI 22.89 kg/m²   The patient is alert, oriented and in no distress.    Vital signs as noted above.    Head and neck revealed no carotid bruits or jugular venous distension.  No thyromegaly or lymphadenopathy is present.    Lungs clear.  No wheezing.  Breath sounds " are normal bilaterally.    Heart normal first and second heart sounds.  Late peaking systolic ejectionmurmur..  No pericardial rub is present.  No gallop is present.    Abdomen soft and nontender.  No organomegaly is present.    Extremities revealed good peripheral pulses without any pedal edema.    Skin warm and dry.    Musculoskeletal system is grossly normal.    CNS grossly normal.           Diagnosis Plan   1. Aortic stenosis, severe        2. Essential hypertension        3. Mixed hyperlipidemia        4. Chronic coronary artery disease        5. Peripheral vascular disease, unspecified        6. History of BPH        7. Abdominal aortic aneurysm (AAA) without rupture, unspecified part        8. Pure hypercholesterolemia        LAB RESULTS (LAST 7 DAYS)    CBC        BMP        CMP         BNP        TROPONIN        CoAg        Creatinine Clearance  Estimated Creatinine Clearance: 68.7 mL/min (by C-G formula based on SCr of 0.81 mg/dL).    ABG        Radiology  No radiology results for the last day    EKG  Procedures    Stress test      Echocardiogram      Cardiac catheterization  No results found for this or any previous visit.          Assessment and Plan       Diagnoses and all orders for this visit:    1. Aortic stenosis, severe (Primary)    2. Essential hypertension    3. Mixed hyperlipidemia    4. Chronic coronary artery disease  Overview:  Had MI on 2014 - [facility] emergent cath - had 2 stents      5. Peripheral vascular disease, unspecified    6. History of BPH    7. Abdominal aortic aneurysm (AAA) without rupture, unspecified part  Overview:  Had AAA repaired in '08 with a sleeve - repeat CT in 9/19 - no evidence of any leaking.       8. Pure hypercholesterolemia       Severe aortic stenosis  Echocardiogram shows EF of 55 to 60%, grade 1 diastolic dysfunction and severe aortic stenosis with aortic valve area 0.7 cm² and mean/peak gradient of 50/75 mmHg.  I have explained the natural history and therapeutic  options to the patient.  I have also described TAVR in details using a heart model.  I have described the work-up that he will require prior to getting a TAVR.  We will proceed with cardiac catheterization and TAVR CTA.    Coronary artery disease  Patient reports previous PCI with 2 stents in 2014  Continue with aspirin, statin, metoprolol  Currently chest pain-free     Hypertension  Well-controlled  Currently only on metoprolol  Changed metoprolol to XL     Hyperlipidemia  On pravastatin  LDL 79, HDL 54, triglyceride 92 and total cholesterol 150  Unable to tolerate high intensity statin due to myalgias.     Peripheral arterial disease  AAA status post repair  CT scan shows CT shows aortobiiliac bypass graft appears patent without significant  stenosis.  Left greater than right SFA and popliteal artery disease, likely  hemodynamically significant on the left.  Moderate multifocal infrapopliteal disease with two-vessel runoff bilaterally  Bilateral ZARI normal  Focus on hypertension and heart rate control with beta-blocker  Continue statin     BPH   Continue tamsulosin

## 2023-11-01 ENCOUNTER — OFFICE VISIT (OUTPATIENT)
Dept: CARDIOLOGY | Facility: CLINIC | Age: 83
End: 2023-11-01
Payer: MEDICARE

## 2023-11-01 VITALS
OXYGEN SATURATION: 96 % | DIASTOLIC BLOOD PRESSURE: 84 MMHG | HEIGHT: 69 IN | HEART RATE: 74 BPM | BODY MASS INDEX: 22.96 KG/M2 | SYSTOLIC BLOOD PRESSURE: 139 MMHG | WEIGHT: 155 LBS

## 2023-11-01 DIAGNOSIS — I25.10 CHRONIC CORONARY ARTERY DISEASE: ICD-10-CM

## 2023-11-01 DIAGNOSIS — I35.0 AORTIC STENOSIS, SEVERE: Primary | ICD-10-CM

## 2023-11-01 DIAGNOSIS — E78.2 MIXED HYPERLIPIDEMIA: ICD-10-CM

## 2023-11-01 DIAGNOSIS — I73.9 PERIPHERAL VASCULAR DISEASE, UNSPECIFIED: ICD-10-CM

## 2023-11-01 DIAGNOSIS — I10 ESSENTIAL HYPERTENSION: ICD-10-CM

## 2023-11-01 DIAGNOSIS — E78.00 PURE HYPERCHOLESTEROLEMIA: ICD-10-CM

## 2023-11-01 DIAGNOSIS — Z87.438 HISTORY OF BPH: ICD-10-CM

## 2023-11-01 DIAGNOSIS — I71.40 ABDOMINAL AORTIC ANEURYSM (AAA) WITHOUT RUPTURE, UNSPECIFIED PART: ICD-10-CM

## 2023-11-02 ENCOUNTER — TELEPHONE (OUTPATIENT)
Dept: CARDIOLOGY | Facility: CLINIC | Age: 83
End: 2023-11-02
Payer: MEDICARE

## 2023-11-02 DIAGNOSIS — I35.0 AORTIC STENOSIS, SEVERE: Primary | ICD-10-CM

## 2023-11-02 NOTE — TELEPHONE ENCOUNTER
Please schedule left heart cath with Dr. Boogie. The patient will also need TAVR CTA scheduled/done on the same day. Pre-procedure labs ordered. He does not need to hold any medications prior to his procedure.

## 2023-11-02 NOTE — TELEPHONE ENCOUNTER
Hanna is waiting for Laurence to return to work with her to schedule cath and TAVR CT on the same day.

## 2023-11-07 ENCOUNTER — TELEPHONE (OUTPATIENT)
Dept: CARDIOLOGY | Facility: HOSPITAL | Age: 83
End: 2023-11-07

## 2023-11-07 PROBLEM — I35.0 AORTIC STENOSIS, SEVERE: Status: ACTIVE | Noted: 2023-11-01

## 2023-11-07 NOTE — TELEPHONE ENCOUNTER
Telephoned patient for an introduction to the structural heart team.  Education given on aortic stenosis and procedures available for treatment.  Educational handouts including a shared decision making pamphlet and contact information mailed to patient's home address.  I will continue to follow and arrange further workup as needed.  Heart cath, CTS appointment, & CT scheduled.  Instructions given to pt verbally and mailed to home address.  Pt ABEL.

## 2023-11-14 ENCOUNTER — HOSPITAL ENCOUNTER (OUTPATIENT)
Dept: CT IMAGING | Facility: HOSPITAL | Age: 83
Discharge: HOME | End: 2023-11-14
Admitting: NURSE PRACTITIONER
Payer: MEDICARE

## 2023-11-14 DIAGNOSIS — I35.0 AORTIC STENOSIS, SEVERE: ICD-10-CM

## 2023-11-14 LAB
CREAT BLDA-MCNC: 0.8 MG/DL (ref 0.6–1.3)
EGFRCR SERPLBLD CKD-EPI 2021: 87.8 ML/MIN/1.73

## 2023-11-14 PROCEDURE — 74174 CTA ABD&PLVS W/CONTRAST: CPT

## 2023-11-14 PROCEDURE — 82565 ASSAY OF CREATININE: CPT

## 2023-11-14 PROCEDURE — 71275 CT ANGIOGRAPHY CHEST: CPT

## 2023-11-14 PROCEDURE — 25510000001 IOPAMIDOL PER 1 ML: Performed by: NURSE PRACTITIONER

## 2023-11-14 RX ADMIN — IOPAMIDOL 150 ML: 755 INJECTION, SOLUTION INTRAVENOUS at 09:17

## 2023-11-14 NOTE — NURSING NOTE
0841 Arrived in IR department  0842 HR 76  0845 # 20 angiocath placed in R AC- Creat sent  HR 66  0856 To CT for procedure  0914 Back from CT  0916 IV Dc'd  0920 Discharged home with instructions to increase fluid intake over the next 24 hours.

## 2023-11-15 ENCOUNTER — TRANSCRIBE ORDERS (OUTPATIENT)
Dept: VASCULAR SURGERY | Facility: HOSPITAL | Age: 83
End: 2023-11-15
Payer: MEDICARE

## 2023-11-15 DIAGNOSIS — I73.9 PVD (PERIPHERAL VASCULAR DISEASE): Primary | ICD-10-CM

## 2023-11-15 DIAGNOSIS — I71.40 ABDOMINAL AORTIC ANEURYSM (AAA) WITHOUT RUPTURE, UNSPECIFIED PART: ICD-10-CM

## 2023-11-15 DIAGNOSIS — R09.89 BRUIT: ICD-10-CM

## 2023-11-16 ENCOUNTER — LAB (OUTPATIENT)
Dept: LAB | Facility: HOSPITAL | Age: 83
End: 2023-11-16
Payer: MEDICARE

## 2023-11-16 ENCOUNTER — HOSPITAL ENCOUNTER (OUTPATIENT)
Facility: HOSPITAL | Age: 83
Setting detail: HOSPITAL OUTPATIENT SURGERY
Discharge: HOME | End: 2023-11-16
Attending: INTERNAL MEDICINE | Admitting: INTERNAL MEDICINE
Payer: MEDICARE

## 2023-11-16 VITALS
SYSTOLIC BLOOD PRESSURE: 123 MMHG | WEIGHT: 154.32 LBS | HEIGHT: 67 IN | OXYGEN SATURATION: 97 % | DIASTOLIC BLOOD PRESSURE: 66 MMHG | HEART RATE: 75 BPM | RESPIRATION RATE: 17 BRPM | TEMPERATURE: 97.7 F | BODY MASS INDEX: 24.22 KG/M2

## 2023-11-16 DIAGNOSIS — I35.0 AORTIC STENOSIS, SEVERE: ICD-10-CM

## 2023-11-16 LAB
ALBUMIN SERPL-MCNC: 4.3 G/DL (ref 3.5–5.2)
ALBUMIN/GLOB SERPL: 1.6 G/DL
ALP SERPL-CCNC: 89 U/L (ref 39–117)
ALT SERPL W P-5'-P-CCNC: 9 U/L (ref 1–41)
ANION GAP SERPL CALCULATED.3IONS-SCNC: 9 MMOL/L (ref 5–15)
AST SERPL-CCNC: 21 U/L (ref 1–40)
BASE DEFICIT: ABNORMAL
BASE EXCESS BLDA CALC-SCNC: <0 MMOL/L (ref 0–3)
BILIRUB SERPL-MCNC: 0.8 MG/DL (ref 0–1.2)
BUN SERPL-MCNC: 14 MG/DL (ref 8–23)
BUN/CREAT SERPL: 20.3 (ref 7–25)
CA-I BLDA-SCNC: 1.29 MMOL/L (ref 1.12–1.32)
CALCIUM SPEC-SCNC: 10 MG/DL (ref 8.6–10.5)
CHLORIDE SERPL-SCNC: 102 MMOL/L (ref 98–107)
CO2 BLDA-SCNC: 25 MMOL/L (ref 23–27)
CO2 SERPL-SCNC: 28 MMOL/L (ref 22–29)
CREAT SERPL-MCNC: 0.69 MG/DL (ref 0.76–1.27)
DEPRECATED RDW RBC AUTO: 49.4 FL (ref 37–54)
EGFRCR SERPLBLD CKD-EPI 2021: 91.8 ML/MIN/1.73
ERYTHROCYTE [DISTWIDTH] IN BLOOD BY AUTOMATED COUNT: 14.8 % (ref 12.3–15.4)
GLOBULIN UR ELPH-MCNC: 2.7 GM/DL
GLUCOSE BLDC GLUCOMTR-MCNC: 98 MG/DL (ref 70–105)
GLUCOSE SERPL-MCNC: 107 MG/DL (ref 65–99)
HCO3 BLDA-SCNC: 23.6 MMOL/L (ref 22–26)
HCT VFR BLD AUTO: 43.5 % (ref 37.5–51)
HCT VFR BLDA CALC: 39 % (ref 38–51)
HGB BLD-MCNC: 14 G/DL (ref 13–17.7)
HGB BLDA-MCNC: 13.3 G/DL (ref 12–17)
MCH RBC QN AUTO: 30.7 PG (ref 26.6–33)
MCHC RBC AUTO-ENTMCNC: 32.1 G/DL (ref 31.5–35.7)
MCV RBC AUTO: 95.5 FL (ref 79–97)
PCO2 BLDA: 40.1 MM HG (ref 35–45)
PH BLDA: 7.38 PH UNITS (ref 7.35–7.45)
PLATELET # BLD AUTO: 127 10*3/MM3 (ref 140–450)
PMV BLD AUTO: 7.5 FL (ref 6–12)
PO2 BLDA: 72 MM HG (ref 80–105)
POTASSIUM BLDA-SCNC: 3.6 MMOL/L (ref 3.5–4.9)
POTASSIUM SERPL-SCNC: 4.3 MMOL/L (ref 3.5–5.2)
PROT SERPL-MCNC: 7 G/DL (ref 6–8.5)
RBC # BLD AUTO: 4.56 10*6/MM3 (ref 4.14–5.8)
SAO2 % BLDCOA: 94 % (ref 95–98)
SODIUM BLD-SCNC: 140 MMOL/L (ref 138–146)
SODIUM SERPL-SCNC: 139 MMOL/L (ref 136–145)
WBC NRBC COR # BLD AUTO: 4.7 10*3/MM3 (ref 3.4–10.8)

## 2023-11-16 PROCEDURE — 82330 ASSAY OF CALCIUM: CPT

## 2023-11-16 PROCEDURE — 84132 ASSAY OF SERUM POTASSIUM: CPT

## 2023-11-16 PROCEDURE — 99152 MOD SED SAME PHYS/QHP 5/>YRS: CPT | Performed by: INTERNAL MEDICINE

## 2023-11-16 PROCEDURE — 25810000003 SODIUM CHLORIDE 0.9 % SOLUTION: Performed by: INTERNAL MEDICINE

## 2023-11-16 PROCEDURE — 36415 COLL VENOUS BLD VENIPUNCTURE: CPT

## 2023-11-16 PROCEDURE — 93460 R&L HRT ART/VENTRICLE ANGIO: CPT | Performed by: INTERNAL MEDICINE

## 2023-11-16 PROCEDURE — C1769 GUIDE WIRE: HCPCS | Performed by: INTERNAL MEDICINE

## 2023-11-16 PROCEDURE — 82803 BLOOD GASES ANY COMBINATION: CPT

## 2023-11-16 PROCEDURE — C1894 INTRO/SHEATH, NON-LASER: HCPCS | Performed by: INTERNAL MEDICINE

## 2023-11-16 PROCEDURE — 84295 ASSAY OF SERUM SODIUM: CPT

## 2023-11-16 PROCEDURE — 25010000002 MIDAZOLAM PER 1 MG: Performed by: INTERNAL MEDICINE

## 2023-11-16 PROCEDURE — 80053 COMPREHEN METABOLIC PANEL: CPT | Performed by: INTERNAL MEDICINE

## 2023-11-16 PROCEDURE — 85014 HEMATOCRIT: CPT

## 2023-11-16 PROCEDURE — 85027 COMPLETE CBC AUTOMATED: CPT

## 2023-11-16 PROCEDURE — 25010000002 FENTANYL CITRATE (PF) 100 MCG/2ML SOLUTION: Performed by: INTERNAL MEDICINE

## 2023-11-16 PROCEDURE — 25010000002 NITROGLYCERIN 5 MG/ML SOLUTION: Performed by: INTERNAL MEDICINE

## 2023-11-16 PROCEDURE — 25010000002 HEPARIN (PORCINE) PER 1000 UNITS: Performed by: INTERNAL MEDICINE

## 2023-11-16 PROCEDURE — 82947 ASSAY GLUCOSE BLOOD QUANT: CPT

## 2023-11-16 PROCEDURE — C1751 CATH, INF, PER/CENT/MIDLINE: HCPCS | Performed by: INTERNAL MEDICINE

## 2023-11-16 PROCEDURE — 25510000001 IOPAMIDOL PER 1 ML: Performed by: INTERNAL MEDICINE

## 2023-11-16 RX ORDER — LIDOCAINE HYDROCHLORIDE 20 MG/ML
INJECTION, SOLUTION INFILTRATION; PERINEURAL
Status: DISCONTINUED | OUTPATIENT
Start: 2023-11-16 | End: 2023-11-16 | Stop reason: HOSPADM

## 2023-11-16 RX ORDER — DIPHENHYDRAMINE HCL 25 MG
25 CAPSULE ORAL EVERY 6 HOURS PRN
Status: DISCONTINUED | OUTPATIENT
Start: 2023-11-16 | End: 2023-11-16 | Stop reason: HOSPADM

## 2023-11-16 RX ORDER — NICARDIPINE HYDROCHLORIDE 2.5 MG/ML
INJECTION INTRAVENOUS
Status: DISCONTINUED | OUTPATIENT
Start: 2023-11-16 | End: 2023-11-16 | Stop reason: HOSPADM

## 2023-11-16 RX ORDER — NITROGLYCERIN 5 MG/ML
INJECTION, SOLUTION INTRAVENOUS
Status: DISCONTINUED | OUTPATIENT
Start: 2023-11-16 | End: 2023-11-16 | Stop reason: HOSPADM

## 2023-11-16 RX ORDER — SODIUM CHLORIDE 9 MG/ML
INJECTION, SOLUTION INTRAVENOUS
Status: COMPLETED | OUTPATIENT
Start: 2023-11-16 | End: 2023-11-16

## 2023-11-16 RX ORDER — ONDANSETRON 2 MG/ML
4 INJECTION INTRAMUSCULAR; INTRAVENOUS EVERY 6 HOURS PRN
Status: DISCONTINUED | OUTPATIENT
Start: 2023-11-16 | End: 2023-11-16 | Stop reason: HOSPADM

## 2023-11-16 RX ORDER — HEPARIN SODIUM 1000 [USP'U]/ML
INJECTION, SOLUTION INTRAVENOUS; SUBCUTANEOUS
Status: DISCONTINUED | OUTPATIENT
Start: 2023-11-16 | End: 2023-11-16 | Stop reason: HOSPADM

## 2023-11-16 RX ORDER — NITROGLYCERIN 0.4 MG/1
0.4 TABLET SUBLINGUAL
Status: DISCONTINUED | OUTPATIENT
Start: 2023-11-16 | End: 2023-11-16 | Stop reason: HOSPADM

## 2023-11-16 RX ORDER — FENTANYL CITRATE 50 UG/ML
INJECTION, SOLUTION INTRAMUSCULAR; INTRAVENOUS
Status: DISCONTINUED | OUTPATIENT
Start: 2023-11-16 | End: 2023-11-16 | Stop reason: HOSPADM

## 2023-11-16 RX ORDER — ACETAMINOPHEN 325 MG/1
650 TABLET ORAL EVERY 4 HOURS PRN
Status: DISCONTINUED | OUTPATIENT
Start: 2023-11-16 | End: 2023-11-16 | Stop reason: HOSPADM

## 2023-11-16 RX ORDER — ONDANSETRON 4 MG/1
4 TABLET, FILM COATED ORAL EVERY 6 HOURS PRN
Status: DISCONTINUED | OUTPATIENT
Start: 2023-11-16 | End: 2023-11-16 | Stop reason: HOSPADM

## 2023-11-16 RX ORDER — MIDAZOLAM HYDROCHLORIDE 1 MG/ML
INJECTION INTRAMUSCULAR; INTRAVENOUS
Status: DISCONTINUED | OUTPATIENT
Start: 2023-11-16 | End: 2023-11-16 | Stop reason: HOSPADM

## 2023-11-16 NOTE — INTERVAL H&P NOTE
H&P reviewed. The patient was examined and there are no changes to the H&P.      Pre-TAVR right and left heart cath.

## 2023-11-16 NOTE — Clinical Note
A 7 fr sheath was  inserted using micropuncture technique with ultrasound guidance into the right internal jugular vein.

## 2023-11-16 NOTE — Clinical Note
Prepped: right groin, right neck and Right Wrist. Prepped with: ChloraPrep. The site was clipped. The patient was draped in a sterile fashion.

## 2023-11-16 NOTE — Clinical Note
Hemostasis started on the right radial artery. Radial compression device applied to vessel. Hemostasis achieved successfully. Closure device additional comment: TR band by Dr. Boogie

## 2023-11-16 NOTE — Clinical Note
Manual pressure applied to vessel. Manual pressure was held by Dr. Boogie. Manual pressure was held for 10 min. Hemostasis achieved successfully.

## 2023-11-17 LAB
BASE DEFICIT: ABNORMAL
BASE EXCESS BLDV CALC-SCNC: ABNORMAL MMOL/L
CA-I BLDA-SCNC: 1.27 MMOL/L (ref 1.12–1.32)
CO2 CONTENT VENOUS: ABNORMAL
GLUCOSE BLDC GLUCOMTR-MCNC: 95 MG/DL (ref 70–105)
HCO3 BLDV-SCNC: 24.6 MMOL/L (ref 23–28)
HCT VFR BLDA CALC: 37 % (ref 38–51)
HGB BLDA-MCNC: 12.6 G/DL (ref 12–17)
PCO2 BLDV: 43.2 MM HG (ref 41–51)
PH BLDV: 7.36 PH UNITS (ref 7.31–7.41)
PO2 BLDV: 33 MM HG (ref 35–42)
POTASSIUM BLDA-SCNC: 3.6 MMOL/L (ref 3.5–4.9)
SAO2 % BLDCOV: 26 % (ref 45–75)
SODIUM BLD-SCNC: 141 MMOL/L (ref 138–146)

## 2023-11-21 ENCOUNTER — HOSPITAL ENCOUNTER (OUTPATIENT)
Dept: CT IMAGING | Facility: HOSPITAL | Age: 83
End: 2023-11-21
Payer: MEDICARE

## 2023-11-22 RX ORDER — PRAVASTATIN SODIUM 80 MG/1
80 TABLET ORAL NIGHTLY
Qty: 90 TABLET | Refills: 3 | Status: SHIPPED | OUTPATIENT
Start: 2023-11-22

## 2023-11-27 ENCOUNTER — OFFICE VISIT (OUTPATIENT)
Dept: CARDIAC SURGERY | Facility: CLINIC | Age: 83
End: 2023-11-27
Payer: MEDICARE

## 2023-11-27 VITALS
HEIGHT: 69 IN | BODY MASS INDEX: 22.96 KG/M2 | DIASTOLIC BLOOD PRESSURE: 71 MMHG | TEMPERATURE: 97.5 F | HEART RATE: 65 BPM | SYSTOLIC BLOOD PRESSURE: 157 MMHG | OXYGEN SATURATION: 97 % | WEIGHT: 155 LBS | RESPIRATION RATE: 20 BRPM

## 2023-11-27 DIAGNOSIS — I35.0 NONRHEUMATIC AORTIC VALVE STENOSIS: Primary | ICD-10-CM

## 2023-11-30 ENCOUNTER — PREP FOR SURGERY (OUTPATIENT)
Dept: OTHER | Facility: HOSPITAL | Age: 83
End: 2023-11-30
Payer: MEDICARE

## 2023-11-30 DIAGNOSIS — I35.0 AORTIC STENOSIS, SEVERE: Primary | ICD-10-CM

## 2023-11-30 RX ORDER — SODIUM CHLORIDE 0.9 % (FLUSH) 0.9 %
10 SYRINGE (ML) INJECTION EVERY 12 HOURS SCHEDULED
OUTPATIENT
Start: 2023-11-30

## 2023-11-30 RX ORDER — SODIUM CHLORIDE 9 MG/ML
40 INJECTION, SOLUTION INTRAVENOUS AS NEEDED
OUTPATIENT
Start: 2023-11-30

## 2023-11-30 RX ORDER — CHLORHEXIDINE GLUCONATE 500 MG/1
1 CLOTH TOPICAL EVERY 12 HOURS PRN
OUTPATIENT
Start: 2023-11-30

## 2023-11-30 RX ORDER — SODIUM CHLORIDE 0.9 % (FLUSH) 0.9 %
10 SYRINGE (ML) INJECTION AS NEEDED
OUTPATIENT
Start: 2023-11-30

## 2023-11-30 NOTE — PROGRESS NOTES
"Chief Complaint  Aortic Stenosis    Subjective        Yordy Hebert presents to [facility] CARDIAC SURGERY  History of Present Illness  82 years old male with coronary artery disease, hypertension, hyperlipidemia, peripheral vascular disease, abdominal aortic aneurysm repair (aortobi iliac repair) who presents for evaluation about his aortic valve stenosis.  He has shortness of breath with exertion over the last few months.  He denies any syncope or chest pain.  Echocardiogram on 10/24/2023 showed severe aortic valve stenosis with a mean gradient of 49 mmHg and an aortic valve area of 0.7 cm². Cardiac cath on 11/16/2023 showed 70 to 80% proximal LAD disease (which is unchanged when compared to previous cardiac catheterization from 2014 in 2016), severe aortic stenosis with normal wedge pressure and EDP.  .      Objective   Vital Signs:  /71 (BP Location: Left arm, Patient Position: Sitting, Cuff Size: Adult)   Pulse 65   Temp 97.5 °F (36.4 °C)   Resp 20   Ht 175.3 cm (69\")   Wt 70.3 kg (155 lb)   SpO2 97%   BMI 22.89 kg/m²   Estimated body mass index is 22.89 kg/m² as calculated from the following:    Height as of this encounter: 175.3 cm (69\").    Weight as of this encounter: 70.3 kg (155 lb).       BMI is within normal parameters. No other follow-up for BMI required.      Physical Exam  Constitutional:       Appearance: Normal appearance. He is normal weight.   Cardiovascular:      Rate and Rhythm: Normal rate.      Heart sounds: Murmur heard.   Neurological:      General: No focal deficit present.      Mental Status: He is alert and oriented to person, place, and time. Mental status is at baseline.   Psychiatric:         Mood and Affect: Mood normal.         Behavior: Behavior normal.         Thought Content: Thought content normal.         Judgment: Judgment normal.      Result Review :                   Assessment and Plan   There are no diagnoses linked to this " encounter.    -Coronary artery disease.  Medical treatment  -Severe symptomatic aortic valve stenosis.  For TAVR    82 years old male with severe symptomatic aortic valve stenosis.  I think that his aortic valve needs to be treated to improve symptoms and prolong life.  TAVR CTA showed that transfemoral TAVR is an option.  He is high risk for SAVR, I think that TAVR is the best option for him.  He had a previous aortobiiliac repair but the diameter of the vessels and grafts are okay for left transfemoral TAVR.  I explained risk and benefits of the procedure to the patient and family and he agreed to proceed.  He will be an open rescue in case needed.       I spent 60 minutes caring for Yordy on this date of service. This time includes time spent by me in the following activities:preparing for the visit, reviewing tests, obtaining and/or reviewing a separately obtained history, performing a medically appropriate examination and/or evaluation , counseling and educating the patient/family/caregiver, ordering medications, tests, or procedures, referring and communicating with other health care professionals , documenting information in the medical record, independently interpreting results and communicating that information with the patient/family/caregiver, and care coordination  Follow Up   No follow-ups on file.  Patient was given instructions and counseling regarding his condition or for health maintenance advice. Please see specific information pulled into the AVS if appropriate.

## 2023-12-01 DIAGNOSIS — Z95.2 S/P TAVR (TRANSCATHETER AORTIC VALVE REPLACEMENT): Primary | ICD-10-CM

## 2023-12-06 ENCOUNTER — TELEPHONE (OUTPATIENT)
Dept: CARDIAC REHAB | Facility: HOSPITAL | Age: 83
End: 2023-12-06
Payer: MEDICARE

## 2023-12-07 ENCOUNTER — HOSPITAL ENCOUNTER (OUTPATIENT)
Dept: CARDIOLOGY | Facility: HOSPITAL | Age: 83
Discharge: HOME | End: 2023-12-07
Payer: MEDICARE

## 2023-12-07 ENCOUNTER — PRE-ADMISSION TESTING (OUTPATIENT)
Dept: PREADMISSION TESTING | Facility: HOSPITAL | Age: 83
End: 2023-12-07
Payer: MEDICARE

## 2023-12-07 ENCOUNTER — HOSPITAL ENCOUNTER (OUTPATIENT)
Dept: GENERAL RADIOLOGY | Facility: HOSPITAL | Age: 83
Discharge: HOME | End: 2023-12-07
Payer: MEDICARE

## 2023-12-07 ENCOUNTER — LAB (OUTPATIENT)
Dept: LAB | Facility: HOSPITAL | Age: 83
End: 2023-12-07
Payer: MEDICARE

## 2023-12-07 VITALS
WEIGHT: 153.6 LBS | SYSTOLIC BLOOD PRESSURE: 130 MMHG | DIASTOLIC BLOOD PRESSURE: 74 MMHG | HEART RATE: 71 BPM | TEMPERATURE: 98.5 F | HEIGHT: 67 IN | OXYGEN SATURATION: 96 % | BODY MASS INDEX: 24.11 KG/M2 | RESPIRATION RATE: 16 BRPM

## 2023-12-07 DIAGNOSIS — I35.0 AORTIC STENOSIS, SEVERE: ICD-10-CM

## 2023-12-07 DIAGNOSIS — Z95.2 S/P TAVR (TRANSCATHETER AORTIC VALVE REPLACEMENT): ICD-10-CM

## 2023-12-07 LAB
ABO GROUP BLD: NORMAL
ALBUMIN SERPL-MCNC: 4.4 G/DL (ref 3.5–5.2)
ALBUMIN/GLOB SERPL: 1.5 G/DL
ALP SERPL-CCNC: 92 U/L (ref 39–117)
ALT SERPL W P-5'-P-CCNC: 14 U/L (ref 1–41)
ANION GAP SERPL CALCULATED.3IONS-SCNC: 9 MMOL/L (ref 5–15)
AST SERPL-CCNC: 20 U/L (ref 1–40)
BASOPHILS # BLD AUTO: 0 10*3/MM3 (ref 0–0.2)
BASOPHILS NFR BLD AUTO: 0.7 % (ref 0–1.5)
BILIRUB SERPL-MCNC: 0.8 MG/DL (ref 0–1.2)
BLD GP AB SCN SERPL QL: NEGATIVE
BUN SERPL-MCNC: 19 MG/DL (ref 8–23)
BUN/CREAT SERPL: 25.7 (ref 7–25)
CALCIUM SPEC-SCNC: 10.2 MG/DL (ref 8.6–10.5)
CHLORIDE SERPL-SCNC: 99 MMOL/L (ref 98–107)
CO2 SERPL-SCNC: 31 MMOL/L (ref 22–29)
CREAT SERPL-MCNC: 0.74 MG/DL (ref 0.76–1.27)
DEPRECATED RDW RBC AUTO: 48.1 FL (ref 37–54)
EGFRCR SERPLBLD CKD-EPI 2021: 89.3 ML/MIN/1.73
EOSINOPHIL # BLD AUTO: 0.3 10*3/MM3 (ref 0–0.4)
EOSINOPHIL NFR BLD AUTO: 8.4 % (ref 0.3–6.2)
ERYTHROCYTE [DISTWIDTH] IN BLOOD BY AUTOMATED COUNT: 14.7 % (ref 12.3–15.4)
GLOBULIN UR ELPH-MCNC: 3 GM/DL
GLUCOSE SERPL-MCNC: 117 MG/DL (ref 65–99)
HCT VFR BLD AUTO: 41 % (ref 37.5–51)
HGB BLD-MCNC: 13.6 G/DL (ref 13–17.7)
INR PPP: 1 (ref 0.93–1.1)
LYMPHOCYTES # BLD AUTO: 0.7 10*3/MM3 (ref 0.7–3.1)
LYMPHOCYTES NFR BLD AUTO: 17.9 % (ref 19.6–45.3)
MCH RBC QN AUTO: 31.5 PG (ref 26.6–33)
MCHC RBC AUTO-ENTMCNC: 33.3 G/DL (ref 31.5–35.7)
MCV RBC AUTO: 94.6 FL (ref 79–97)
MONOCYTES # BLD AUTO: 0.4 10*3/MM3 (ref 0.1–0.9)
MONOCYTES NFR BLD AUTO: 11.7 % (ref 5–12)
MRSA DNA SPEC QL NAA+PROBE: NORMAL
NEUTROPHILS NFR BLD AUTO: 2.3 10*3/MM3 (ref 1.7–7)
NEUTROPHILS NFR BLD AUTO: 61.3 % (ref 42.7–76)
NRBC BLD AUTO-RTO: 0.1 /100 WBC (ref 0–0.2)
PLATELET # BLD AUTO: 162 10*3/MM3 (ref 140–450)
PMV BLD AUTO: 6.9 FL (ref 6–12)
POTASSIUM SERPL-SCNC: 3.9 MMOL/L (ref 3.5–5.2)
PROT SERPL-MCNC: 7.4 G/DL (ref 6–8.5)
PROTHROMBIN TIME: 10.9 SECONDS (ref 9.6–11.7)
QT INTERVAL: 411 MS
QTC INTERVAL: 473 MS
RBC # BLD AUTO: 4.33 10*6/MM3 (ref 4.14–5.8)
RH BLD: POSITIVE
SARS-COV-2 RNA RESP QL NAA+PROBE: NOT DETECTED
SODIUM SERPL-SCNC: 139 MMOL/L (ref 136–145)
T&S EXPIRATION DATE: NORMAL
WBC NRBC COR # BLD AUTO: 3.7 10*3/MM3 (ref 3.4–10.8)

## 2023-12-07 PROCEDURE — 86901 BLOOD TYPING SEROLOGIC RH(D): CPT

## 2023-12-07 PROCEDURE — 86900 BLOOD TYPING SEROLOGIC ABO: CPT

## 2023-12-07 PROCEDURE — 36415 COLL VENOUS BLD VENIPUNCTURE: CPT

## 2023-12-07 PROCEDURE — 86923 COMPATIBILITY TEST ELECTRIC: CPT

## 2023-12-07 PROCEDURE — 86850 RBC ANTIBODY SCREEN: CPT

## 2023-12-07 PROCEDURE — 85025 COMPLETE CBC W/AUTO DIFF WBC: CPT

## 2023-12-07 PROCEDURE — 71046 X-RAY EXAM CHEST 2 VIEWS: CPT

## 2023-12-07 PROCEDURE — 85610 PROTHROMBIN TIME: CPT

## 2023-12-07 PROCEDURE — 93005 ELECTROCARDIOGRAM TRACING: CPT | Performed by: INTERNAL MEDICINE

## 2023-12-07 PROCEDURE — 87641 MR-STAPH DNA AMP PROBE: CPT

## 2023-12-07 PROCEDURE — 87635 SARS-COV-2 COVID-19 AMP PRB: CPT

## 2023-12-07 PROCEDURE — 80053 COMPREHEN METABOLIC PANEL: CPT

## 2023-12-07 NOTE — PAT
Emergency Contact: Lorena Hebert (wife) [phone]              Akila Montilla (daughter) [phone]

## 2023-12-10 ENCOUNTER — ANESTHESIA EVENT (OUTPATIENT)
Dept: PERIOP | Facility: HOSPITAL | Age: 83
DRG: 267 | End: 2023-12-10
Payer: MEDICARE

## 2023-12-11 ENCOUNTER — APPOINTMENT (OUTPATIENT)
Dept: CARDIOLOGY | Facility: HOSPITAL | Age: 83
DRG: 267 | End: 2023-12-11
Payer: MEDICARE

## 2023-12-11 ENCOUNTER — APPOINTMENT (OUTPATIENT)
Dept: GENERAL RADIOLOGY | Facility: HOSPITAL | Age: 83
DRG: 267 | End: 2023-12-11
Payer: MEDICARE

## 2023-12-11 ENCOUNTER — ANESTHESIA (OUTPATIENT)
Dept: CARDIOLOGY | Facility: HOSPITAL | Age: 83
DRG: 267 | End: 2023-12-11
Payer: MEDICARE

## 2023-12-11 ENCOUNTER — HOSPITAL ENCOUNTER (OUTPATIENT)
Facility: HOSPITAL | Age: 83
Setting detail: HOSPITAL OUTPATIENT SURGERY
DRG: 267 | End: 2023-12-11
Attending: INTERNAL MEDICINE | Admitting: INTERNAL MEDICINE
Payer: MEDICARE

## 2023-12-11 ENCOUNTER — PREP FOR SURGERY (OUTPATIENT)
Dept: OTHER | Facility: HOSPITAL | Age: 83
End: 2023-12-11
Payer: MEDICARE

## 2023-12-11 ENCOUNTER — HOSPITAL ENCOUNTER (INPATIENT)
Facility: HOSPITAL | Age: 83
LOS: 1 days | Discharge: HOME HEALTH | DRG: 267 | End: 2023-12-12
Attending: INTERNAL MEDICINE | Admitting: THORACIC SURGERY (CARDIOTHORACIC VASCULAR SURGERY)
Payer: MEDICARE

## 2023-12-11 ENCOUNTER — ANCILLARY PROCEDURE (OUTPATIENT)
Dept: PERIOP | Facility: HOSPITAL | Age: 83
DRG: 267 | End: 2023-12-11
Payer: MEDICARE

## 2023-12-11 ENCOUNTER — ANESTHESIA EVENT (OUTPATIENT)
Dept: CARDIOLOGY | Facility: HOSPITAL | Age: 83
DRG: 267 | End: 2023-12-11
Payer: MEDICARE

## 2023-12-11 ENCOUNTER — ANESTHESIA (OUTPATIENT)
Dept: PERIOP | Facility: HOSPITAL | Age: 83
DRG: 267 | End: 2023-12-11
Payer: MEDICARE

## 2023-12-11 DIAGNOSIS — I44.2 COMPLETE HEART BLOCK: ICD-10-CM

## 2023-12-11 DIAGNOSIS — Z95.2 S/P TAVR (TRANSCATHETER AORTIC VALVE REPLACEMENT): Primary | ICD-10-CM

## 2023-12-11 DIAGNOSIS — Z95.0 PRESENCE OF CARDIAC PACEMAKER: ICD-10-CM

## 2023-12-11 DIAGNOSIS — R53.81 PHYSICAL DECONDITIONING: ICD-10-CM

## 2023-12-11 DIAGNOSIS — M25.619 LIMITED RANGE OF MOTION (ROM) OF SHOULDER: ICD-10-CM

## 2023-12-11 DIAGNOSIS — R53.1 GENERALIZED WEAKNESS: ICD-10-CM

## 2023-12-11 DIAGNOSIS — I35.0 AORTIC STENOSIS, SEVERE: ICD-10-CM

## 2023-12-11 DIAGNOSIS — I44.2 COMPLETE HEART BLOCK: Primary | ICD-10-CM

## 2023-12-11 LAB
BASE DEFICIT: ABNORMAL
BASE DEFICIT: ABNORMAL
BASE EXCESS BLDA CALC-SCNC: 3 MMOL/L (ref 0–3)
BASE EXCESS BLDA CALC-SCNC: <0 MMOL/L (ref 0–3)
BH BB BLOOD EXPIRATION DATE: NORMAL
BH BB BLOOD EXPIRATION DATE: NORMAL
BH BB BLOOD TYPE BARCODE: 7300
BH BB BLOOD TYPE BARCODE: 7300
BH BB DISPENSE STATUS: NORMAL
BH BB DISPENSE STATUS: NORMAL
BH BB PRODUCT CODE: NORMAL
BH BB PRODUCT CODE: NORMAL
BH BB UNIT NUMBER: NORMAL
BH BB UNIT NUMBER: NORMAL
BH CV ECHO MEAS - AO MAX PG: 67.9 MMHG
BH CV ECHO MEAS - AO MEAN PG: 39 MMHG
BH CV ECHO MEAS - AO V2 MAX: 412 CM/SEC
BH CV ECHO MEAS - AO V2 VTI: 104 CM
BH CV ECHO MEAS - AVA(I,D): 0.49 CM2
BH CV ECHO MEAS - LV MAX PG: 5.2 MMHG
BH CV ECHO MEAS - LV MEAN PG: 2 MMHG
BH CV ECHO MEAS - LV V1 MAX: 114 CM/SEC
BH CV ECHO MEAS - LV V1 VTI: 19.9 CM
BH CV ECHO MEAS - LVOT AREA: 2.5 CM2
BH CV ECHO MEAS - LVOT DIAM: 1.8 CM
BH CV ECHO MEAS - SV(LVOT): 50.6 ML
CA-I BLDA-SCNC: 1.25 MMOL/L (ref 1.12–1.32)
CA-I BLDA-SCNC: 1.33 MMOL/L (ref 1.12–1.32)
CO2 BLDA-SCNC: 25 MMOL/L (ref 23–27)
CO2 BLDA-SCNC: 31 MMOL/L (ref 23–27)
CROSSMATCH INTERPRETATION: NORMAL
CROSSMATCH INTERPRETATION: NORMAL
GLUCOSE BLDC GLUCOMTR-MCNC: 103 MG/DL (ref 70–105)
GLUCOSE BLDC GLUCOMTR-MCNC: 104 MG/DL (ref 70–105)
GLUCOSE BLDC GLUCOMTR-MCNC: 105 MG/DL (ref 70–105)
HCO3 BLDA-SCNC: 23.8 MMOL/L (ref 22–26)
HCO3 BLDA-SCNC: 28.9 MMOL/L (ref 22–26)
HCT VFR BLDA CALC: 33 % (ref 38–51)
HCT VFR BLDA CALC: 37 % (ref 38–51)
HGB BLDA-MCNC: 11.2 G/DL (ref 12–17)
HGB BLDA-MCNC: 12.6 G/DL (ref 12–17)
PCO2 BLDA: 48.9 MM HG (ref 35–45)
PCO2 BLDA: 54.4 MM HG (ref 35–45)
PH BLDA: 7.3 PH UNITS (ref 7.35–7.45)
PH BLDA: 7.33 PH UNITS (ref 7.35–7.45)
PO2 BLDA: 33 MM HG (ref 80–105)
PO2 BLDA: 353 MM HG (ref 80–105)
POTASSIUM BLDA-SCNC: 3.7 MMOL/L (ref 3.5–4.9)
POTASSIUM BLDA-SCNC: 3.8 MMOL/L (ref 3.5–4.9)
SAO2 % BLDCOA: 100 % (ref 95–98)
SAO2 % BLDCOA: 58 % (ref 95–98)
SODIUM BLD-SCNC: 139 MMOL/L (ref 138–146)
SODIUM BLD-SCNC: 141 MMOL/L (ref 138–146)
UNIT  ABO: NORMAL
UNIT  ABO: NORMAL
UNIT  RH: NORMAL
UNIT  RH: NORMAL

## 2023-12-11 PROCEDURE — C1769 GUIDE WIRE: HCPCS | Performed by: INTERNAL MEDICINE

## 2023-12-11 PROCEDURE — 82948 REAGENT STRIP/BLOOD GLUCOSE: CPT

## 2023-12-11 PROCEDURE — 02H63JZ INSERTION OF PACEMAKER LEAD INTO RIGHT ATRIUM, PERCUTANEOUS APPROACH: ICD-10-PCS | Performed by: INTERNAL MEDICINE

## 2023-12-11 PROCEDURE — C1751 CATH, INF, PER/CENT/MIDLINE: HCPCS | Performed by: INTERNAL MEDICINE

## 2023-12-11 PROCEDURE — 0JH606Z INSERTION OF PACEMAKER, DUAL CHAMBER INTO CHEST SUBCUTANEOUS TISSUE AND FASCIA, OPEN APPROACH: ICD-10-PCS | Performed by: INTERNAL MEDICINE

## 2023-12-11 PROCEDURE — 25010000002 HEPARIN (PORCINE) PER 1000 UNITS: Performed by: ANESTHESIOLOGY

## 2023-12-11 PROCEDURE — C1889 IMPLANT/INSERT DEVICE, NOC: HCPCS | Performed by: INTERNAL MEDICINE

## 2023-12-11 PROCEDURE — 25510000001 IOPAMIDOL PER 1 ML: Performed by: INTERNAL MEDICINE

## 2023-12-11 PROCEDURE — 85014 HEMATOCRIT: CPT

## 2023-12-11 PROCEDURE — 02HK3JZ INSERTION OF PACEMAKER LEAD INTO RIGHT VENTRICLE, PERCUTANEOUS APPROACH: ICD-10-PCS | Performed by: INTERNAL MEDICINE

## 2023-12-11 PROCEDURE — C1751 CATH, INF, PER/CENT/MIDLINE: HCPCS | Performed by: ANESTHESIOLOGY

## 2023-12-11 PROCEDURE — 25010000002 PHENYLEPHRINE 10 MG/ML SOLUTION 5 ML VIAL: Performed by: NURSE ANESTHETIST, CERTIFIED REGISTERED

## 2023-12-11 PROCEDURE — 25010000002 HEPARIN (PORCINE) PER 1000 UNITS: Performed by: INTERNAL MEDICINE

## 2023-12-11 PROCEDURE — 25010000002 MIDAZOLAM PER 1 MG: Performed by: ANESTHESIOLOGY

## 2023-12-11 PROCEDURE — C1894 INTRO/SHEATH, NON-LASER: HCPCS | Performed by: INTERNAL MEDICINE

## 2023-12-11 PROCEDURE — C1760 CLOSURE DEV, VASC: HCPCS | Performed by: INTERNAL MEDICINE

## 2023-12-11 PROCEDURE — 25010000002 PHENYLEPHRINE 10 MG/ML SOLUTION: Performed by: NURSE ANESTHETIST, CERTIFIED REGISTERED

## 2023-12-11 PROCEDURE — 25010000002 PROPOFOL 10 MG/ML EMULSION: Performed by: NURSE ANESTHETIST, CERTIFIED REGISTERED

## 2023-12-11 PROCEDURE — 82330 ASSAY OF CALCIUM: CPT

## 2023-12-11 PROCEDURE — 25010000002 PROPOFOL 1000 MG/100ML EMULSION: Performed by: ANESTHESIOLOGY

## 2023-12-11 PROCEDURE — S0260 H&P FOR SURGERY: HCPCS | Performed by: INTERNAL MEDICINE

## 2023-12-11 PROCEDURE — 93355 ECHO TRANSESOPHAGEAL (TEE): CPT | Performed by: INTERNAL MEDICINE

## 2023-12-11 PROCEDURE — 33208 INSRT HEART PM ATRIAL & VENT: CPT | Performed by: INTERNAL MEDICINE

## 2023-12-11 PROCEDURE — 71045 X-RAY EXAM CHEST 1 VIEW: CPT

## 2023-12-11 PROCEDURE — 85347 COAGULATION TIME ACTIVATED: CPT

## 2023-12-11 PROCEDURE — 93321 DOPPLER ECHO F-UP/LMTD STD: CPT

## 2023-12-11 PROCEDURE — 82947 ASSAY GLUCOSE BLOOD QUANT: CPT

## 2023-12-11 PROCEDURE — 93308 TTE F-UP OR LMTD: CPT

## 2023-12-11 PROCEDURE — 25810000003 SODIUM CHLORIDE 0.9 % SOLUTION: Performed by: NURSE ANESTHETIST, CERTIFIED REGISTERED

## 2023-12-11 PROCEDURE — 25010000002 CEFAZOLIN PER 500 MG: Performed by: NURSE ANESTHETIST, CERTIFIED REGISTERED

## 2023-12-11 PROCEDURE — C1725 CATH, TRANSLUMIN NON-LASER: HCPCS | Performed by: INTERNAL MEDICINE

## 2023-12-11 PROCEDURE — C1785 PMKR, DUAL, RATE-RESP: HCPCS | Performed by: INTERNAL MEDICINE

## 2023-12-11 PROCEDURE — C1887 CATHETER, GUIDING: HCPCS | Performed by: INTERNAL MEDICINE

## 2023-12-11 PROCEDURE — 25010000002 LIDOCAINE 1 % SOLUTION: Performed by: INTERNAL MEDICINE

## 2023-12-11 PROCEDURE — 84132 ASSAY OF SERUM POTASSIUM: CPT

## 2023-12-11 PROCEDURE — C1898 LEAD, PMKR, OTHER THAN TRANS: HCPCS | Performed by: INTERNAL MEDICINE

## 2023-12-11 PROCEDURE — 25010000002 PROPOFOL 200 MG/20ML EMULSION: Performed by: NURSE ANESTHETIST, CERTIFIED REGISTERED

## 2023-12-11 PROCEDURE — 94799 UNLISTED PULMONARY SVC/PX: CPT

## 2023-12-11 PROCEDURE — 93325 DOPPLER ECHO COLOR FLOW MAPG: CPT

## 2023-12-11 PROCEDURE — 33361 REPLACE AORTIC VALVE PERQ: CPT | Performed by: THORACIC SURGERY (CARDIOTHORACIC VASCULAR SURGERY)

## 2023-12-11 PROCEDURE — 84295 ASSAY OF SERUM SODIUM: CPT

## 2023-12-11 PROCEDURE — 25010000002 CEFAZOLIN PER 500 MG: Performed by: NURSE PRACTITIONER

## 2023-12-11 PROCEDURE — B41D1ZZ FLUOROSCOPY OF AORTA AND BILATERAL LOWER EXTREMITY ARTERIES USING LOW OSMOLAR CONTRAST: ICD-10-PCS | Performed by: THORACIC SURGERY (CARDIOTHORACIC VASCULAR SURGERY)

## 2023-12-11 PROCEDURE — 25010000002 PROTAMINE SULFATE PER 10 MG: Performed by: ANESTHESIOLOGY

## 2023-12-11 PROCEDURE — 25010000002 ONDANSETRON PER 1 MG: Performed by: INTERNAL MEDICINE

## 2023-12-11 PROCEDURE — 94761 N-INVAS EAR/PLS OXIMETRY MLT: CPT

## 2023-12-11 PROCEDURE — 25010000002 FENTANYL CITRATE (PF) 100 MCG/2ML SOLUTION: Performed by: ANESTHESIOLOGY

## 2023-12-11 PROCEDURE — 25810000003 SODIUM CHLORIDE 0.9 % SOLUTION: Performed by: INTERNAL MEDICINE

## 2023-12-11 PROCEDURE — 82803 BLOOD GASES ANY COMBINATION: CPT

## 2023-12-11 PROCEDURE — 25810000003 SODIUM CHLORIDE 0.9 % SOLUTION 250 ML FLEX CONT: Performed by: NURSE ANESTHETIST, CERTIFIED REGISTERED

## 2023-12-11 PROCEDURE — 02RF38Z REPLACEMENT OF AORTIC VALVE WITH ZOOPLASTIC TISSUE, PERCUTANEOUS APPROACH: ICD-10-PCS | Performed by: THORACIC SURGERY (CARDIOTHORACIC VASCULAR SURGERY)

## 2023-12-11 PROCEDURE — 25010000002 PHENYLEPHRINE 10 MG/ML SOLUTION: Performed by: ANESTHESIOLOGY

## 2023-12-11 PROCEDURE — 33361 REPLACE AORTIC VALVE PERQ: CPT | Performed by: INTERNAL MEDICINE

## 2023-12-11 DEVICE — LD PM CAPSUREFIX NOVUS5076 52CM: Type: IMPLANTABLE DEVICE | Status: FUNCTIONAL

## 2023-12-11 DEVICE — IMPLANTABLE DEVICE: Type: IMPLANTABLE DEVICE | Status: FUNCTIONAL

## 2023-12-11 DEVICE — VLV EVOLUTFX-34 COMM US ROW
Type: IMPLANTABLE DEVICE | Site: HEART | Status: FUNCTIONAL
Brand: EVOLUT™ FX

## 2023-12-11 DEVICE — GEN PM AZURE XT SURESCAN DR MRI: Type: IMPLANTABLE DEVICE | Status: FUNCTIONAL

## 2023-12-11 RX ORDER — PHENYLEPHRINE HYDROCHLORIDE 10 MG/ML
INJECTION INTRAVENOUS AS NEEDED
Status: DISCONTINUED | OUTPATIENT
Start: 2023-12-11 | End: 2023-12-11 | Stop reason: SURG

## 2023-12-11 RX ORDER — ONDANSETRON 2 MG/ML
4 INJECTION INTRAMUSCULAR; INTRAVENOUS ONCE AS NEEDED
Status: DISCONTINUED | OUTPATIENT
Start: 2023-12-11 | End: 2023-12-11

## 2023-12-11 RX ORDER — GLYCOPYRROLATE 0.2 MG/ML
INJECTION INTRAMUSCULAR; INTRAVENOUS AS NEEDED
Status: DISCONTINUED | OUTPATIENT
Start: 2023-12-11 | End: 2023-12-11 | Stop reason: SURG

## 2023-12-11 RX ORDER — IPRATROPIUM BROMIDE AND ALBUTEROL SULFATE 2.5; .5 MG/3ML; MG/3ML
3 SOLUTION RESPIRATORY (INHALATION) ONCE AS NEEDED
Status: DISCONTINUED | OUTPATIENT
Start: 2023-12-11 | End: 2023-12-11 | Stop reason: HOSPADM

## 2023-12-11 RX ORDER — MIDAZOLAM HYDROCHLORIDE 1 MG/ML
INJECTION INTRAMUSCULAR; INTRAVENOUS AS NEEDED
Status: DISCONTINUED | OUTPATIENT
Start: 2023-12-11 | End: 2023-12-11 | Stop reason: SURG

## 2023-12-11 RX ORDER — PROPOFOL 10 MG/ML
INJECTION, EMULSION INTRAVENOUS CONTINUOUS PRN
Status: DISCONTINUED | OUTPATIENT
Start: 2023-12-11 | End: 2023-12-11 | Stop reason: SURG

## 2023-12-11 RX ORDER — SODIUM CHLORIDE 9 MG/ML
30 INJECTION, SOLUTION INTRAVENOUS CONTINUOUS
Status: DISCONTINUED | OUTPATIENT
Start: 2023-12-11 | End: 2023-12-11

## 2023-12-11 RX ORDER — ATORVASTATIN CALCIUM 10 MG/1
10 TABLET, FILM COATED ORAL NIGHTLY
Status: DISCONTINUED | OUTPATIENT
Start: 2023-12-11 | End: 2023-12-12 | Stop reason: HOSPADM

## 2023-12-11 RX ORDER — MIDAZOLAM HYDROCHLORIDE 1 MG/ML
1 INJECTION INTRAMUSCULAR; INTRAVENOUS
Status: DISCONTINUED | OUTPATIENT
Start: 2023-12-11 | End: 2023-12-11

## 2023-12-11 RX ORDER — PROPOFOL 10 MG/ML
INJECTION, EMULSION INTRAVENOUS AS NEEDED
Status: DISCONTINUED | OUTPATIENT
Start: 2023-12-11 | End: 2023-12-11 | Stop reason: SURG

## 2023-12-11 RX ORDER — CEFAZOLIN SODIUM 1 G/3ML
INJECTION, POWDER, FOR SOLUTION INTRAMUSCULAR; INTRAVENOUS AS NEEDED
Status: DISCONTINUED | OUTPATIENT
Start: 2023-12-11 | End: 2023-12-11 | Stop reason: SURG

## 2023-12-11 RX ORDER — NITROGLYCERIN 0.4 MG/1
0.4 TABLET SUBLINGUAL
Status: DISCONTINUED | OUTPATIENT
Start: 2023-12-11 | End: 2023-12-11 | Stop reason: SDUPTHER

## 2023-12-11 RX ORDER — SODIUM CHLORIDE 9 MG/ML
40 INJECTION, SOLUTION INTRAVENOUS AS NEEDED
Status: DISCONTINUED | OUTPATIENT
Start: 2023-12-11 | End: 2023-12-11 | Stop reason: HOSPADM

## 2023-12-11 RX ORDER — ASPIRIN 81 MG/1
81 TABLET ORAL DAILY
Status: DISCONTINUED | OUTPATIENT
Start: 2023-12-11 | End: 2023-12-12 | Stop reason: HOSPADM

## 2023-12-11 RX ORDER — ACETAMINOPHEN 325 MG/1
650 TABLET ORAL EVERY 4 HOURS PRN
Status: DISCONTINUED | OUTPATIENT
Start: 2023-12-11 | End: 2023-12-12 | Stop reason: HOSPADM

## 2023-12-11 RX ORDER — SODIUM CHLORIDE 9 MG/ML
INJECTION, SOLUTION INTRAVENOUS CONTINUOUS PRN
Status: DISCONTINUED | OUTPATIENT
Start: 2023-12-11 | End: 2023-12-11 | Stop reason: SURG

## 2023-12-11 RX ORDER — CLOPIDOGREL BISULFATE 75 MG/1
75 TABLET ORAL DAILY
Status: DISCONTINUED | OUTPATIENT
Start: 2023-12-11 | End: 2023-12-12 | Stop reason: HOSPADM

## 2023-12-11 RX ORDER — HEPARIN SODIUM 1000 [USP'U]/ML
INJECTION, SOLUTION INTRAVENOUS; SUBCUTANEOUS AS NEEDED
Status: DISCONTINUED | OUTPATIENT
Start: 2023-12-11 | End: 2023-12-11 | Stop reason: SURG

## 2023-12-11 RX ORDER — HYDROCODONE BITARTRATE AND ACETAMINOPHEN 5; 325 MG/1; MG/1
1 TABLET ORAL EVERY 6 HOURS PRN
Status: DISCONTINUED | OUTPATIENT
Start: 2023-12-11 | End: 2023-12-12 | Stop reason: HOSPADM

## 2023-12-11 RX ORDER — NITROGLYCERIN 0.4 MG/1
0.4 TABLET SUBLINGUAL
Status: DISCONTINUED | OUTPATIENT
Start: 2023-12-11 | End: 2023-12-12 | Stop reason: HOSPADM

## 2023-12-11 RX ORDER — PROTAMINE SULFATE 10 MG/ML
INJECTION, SOLUTION INTRAVENOUS AS NEEDED
Status: DISCONTINUED | OUTPATIENT
Start: 2023-12-11 | End: 2023-12-11 | Stop reason: SURG

## 2023-12-11 RX ORDER — MEPERIDINE HYDROCHLORIDE 25 MG/ML
12.5 INJECTION INTRAMUSCULAR; INTRAVENOUS; SUBCUTANEOUS
Status: DISCONTINUED | OUTPATIENT
Start: 2023-12-11 | End: 2023-12-11

## 2023-12-11 RX ORDER — BISACODYL 10 MG
10 SUPPOSITORY, RECTAL RECTAL DAILY PRN
Status: DISCONTINUED | OUTPATIENT
Start: 2023-12-12 | End: 2023-12-12 | Stop reason: HOSPADM

## 2023-12-11 RX ORDER — DOCUSATE SODIUM 100 MG/1
100 CAPSULE, LIQUID FILLED ORAL 2 TIMES DAILY PRN
Status: DISCONTINUED | OUTPATIENT
Start: 2023-12-11 | End: 2023-12-12 | Stop reason: HOSPADM

## 2023-12-11 RX ORDER — KETAMINE HCL IN NACL, ISO-OSM 100MG/10ML
SYRINGE (ML) INJECTION AS NEEDED
Status: DISCONTINUED | OUTPATIENT
Start: 2023-12-11 | End: 2023-12-11 | Stop reason: SURG

## 2023-12-11 RX ORDER — LIDOCAINE HYDROCHLORIDE AND EPINEPHRINE BITARTRATE 20; .01 MG/ML; MG/ML
INJECTION, SOLUTION SUBCUTANEOUS
Status: DISCONTINUED | OUTPATIENT
Start: 2023-12-11 | End: 2023-12-11 | Stop reason: HOSPADM

## 2023-12-11 RX ORDER — NALBUPHINE HYDROCHLORIDE 10 MG/ML
10 INJECTION, SOLUTION INTRAMUSCULAR; INTRAVENOUS; SUBCUTANEOUS EVERY 4 HOURS PRN
Status: DISCONTINUED | OUTPATIENT
Start: 2023-12-11 | End: 2023-12-11

## 2023-12-11 RX ORDER — LIDOCAINE HYDROCHLORIDE 10 MG/ML
INJECTION, SOLUTION INFILTRATION; PERINEURAL AS NEEDED
Status: DISCONTINUED | OUTPATIENT
Start: 2023-12-11 | End: 2023-12-11 | Stop reason: HOSPADM

## 2023-12-11 RX ORDER — ALBUTEROL SULFATE 2.5 MG/3ML
2.5 SOLUTION RESPIRATORY (INHALATION) ONCE AS NEEDED
Status: DISCONTINUED | OUTPATIENT
Start: 2023-12-11 | End: 2023-12-11

## 2023-12-11 RX ORDER — FENTANYL CITRATE 50 UG/ML
INJECTION, SOLUTION INTRAMUSCULAR; INTRAVENOUS AS NEEDED
Status: DISCONTINUED | OUTPATIENT
Start: 2023-12-11 | End: 2023-12-11 | Stop reason: SURG

## 2023-12-11 RX ORDER — SODIUM CHLORIDE 0.9 % (FLUSH) 0.9 %
10 SYRINGE (ML) INJECTION AS NEEDED
Status: DISCONTINUED | OUTPATIENT
Start: 2023-12-11 | End: 2023-12-11 | Stop reason: HOSPADM

## 2023-12-11 RX ORDER — LORAZEPAM 2 MG/ML
1 INJECTION INTRAMUSCULAR
Status: DISCONTINUED | OUTPATIENT
Start: 2023-12-11 | End: 2023-12-11

## 2023-12-11 RX ORDER — NALBUPHINE HYDROCHLORIDE 10 MG/ML
2 INJECTION, SOLUTION INTRAMUSCULAR; INTRAVENOUS; SUBCUTANEOUS EVERY 4 HOURS PRN
Status: DISCONTINUED | OUTPATIENT
Start: 2023-12-11 | End: 2023-12-11

## 2023-12-11 RX ORDER — ONDANSETRON 2 MG/ML
4 INJECTION INTRAMUSCULAR; INTRAVENOUS ONCE AS NEEDED
Status: DISCONTINUED | OUTPATIENT
Start: 2023-12-11 | End: 2023-12-11 | Stop reason: HOSPADM

## 2023-12-11 RX ORDER — FLUMAZENIL 0.1 MG/ML
0.2 INJECTION INTRAVENOUS AS NEEDED
Status: DISCONTINUED | OUTPATIENT
Start: 2023-12-11 | End: 2023-12-11

## 2023-12-11 RX ORDER — DIPHENHYDRAMINE HYDROCHLORIDE 50 MG/ML
12.5 INJECTION INTRAMUSCULAR; INTRAVENOUS
Status: DISCONTINUED | OUTPATIENT
Start: 2023-12-11 | End: 2023-12-11

## 2023-12-11 RX ORDER — CHLORHEXIDINE GLUCONATE 500 MG/1
1 CLOTH TOPICAL EVERY 12 HOURS PRN
Status: DISCONTINUED | OUTPATIENT
Start: 2023-12-11 | End: 2023-12-11

## 2023-12-11 RX ORDER — NALOXONE HCL 0.4 MG/ML
0.4 VIAL (ML) INJECTION AS NEEDED
Status: DISCONTINUED | OUTPATIENT
Start: 2023-12-11 | End: 2023-12-11

## 2023-12-11 RX ORDER — NALOXONE HCL 0.4 MG/ML
0.4 VIAL (ML) INJECTION AS NEEDED
Status: DISCONTINUED | OUTPATIENT
Start: 2023-12-11 | End: 2023-12-11 | Stop reason: HOSPADM

## 2023-12-11 RX ORDER — ONDANSETRON 2 MG/ML
4 INJECTION INTRAMUSCULAR; INTRAVENOUS EVERY 6 HOURS PRN
Status: DISCONTINUED | OUTPATIENT
Start: 2023-12-11 | End: 2023-12-12 | Stop reason: HOSPADM

## 2023-12-11 RX ORDER — SODIUM CHLORIDE 0.9 % (FLUSH) 0.9 %
10 SYRINGE (ML) INJECTION EVERY 12 HOURS SCHEDULED
Status: DISCONTINUED | OUTPATIENT
Start: 2023-12-11 | End: 2023-12-11 | Stop reason: HOSPADM

## 2023-12-11 RX ADMIN — CEFAZOLIN 2000 MG: 2 INJECTION, POWDER, FOR SOLUTION INTRAMUSCULAR; INTRAVENOUS at 07:15

## 2023-12-11 RX ADMIN — PROPOFOL INJECTABLE EMULSION 75 MCG/KG/MIN: 10 INJECTION, EMULSION INTRAVENOUS at 17:30

## 2023-12-11 RX ADMIN — PHENYLEPHRINE HYDROCHLORIDE 100 MCG: 10 INJECTION INTRAVENOUS at 18:26

## 2023-12-11 RX ADMIN — PHENYLEPHRINE HYDROCHLORIDE 100 MCG: 10 INJECTION INTRAVENOUS at 18:02

## 2023-12-11 RX ADMIN — ATORVASTATIN CALCIUM 10 MG: 10 TABLET, FILM COATED ORAL at 21:14

## 2023-12-11 RX ADMIN — ONDANSETRON 4 MG: 2 INJECTION INTRAMUSCULAR; INTRAVENOUS at 22:34

## 2023-12-11 RX ADMIN — CLOPIDOGREL BISULFATE 75 MG: 75 TABLET ORAL at 11:09

## 2023-12-11 RX ADMIN — HYDROCODONE BITARTRATE AND ACETAMINOPHEN 1 TABLET: 5; 325 TABLET ORAL at 13:53

## 2023-12-11 RX ADMIN — PHENYLEPHRINE HYDROCHLORIDE 100 MCG: 10 INJECTION INTRAVENOUS at 07:45

## 2023-12-11 RX ADMIN — GLYCOPYRROLATE 0.2 MCG: 0.2 INJECTION INTRAMUSCULAR; INTRAVENOUS at 07:17

## 2023-12-11 RX ADMIN — Medication 20 MG: at 07:45

## 2023-12-11 RX ADMIN — FENTANYL CITRATE 25 MCG: 50 INJECTION, SOLUTION INTRAMUSCULAR; INTRAVENOUS at 07:46

## 2023-12-11 RX ADMIN — HEPARIN SODIUM 2000 UNITS: 1000 INJECTION INTRAVENOUS; SUBCUTANEOUS at 08:28

## 2023-12-11 RX ADMIN — PROPOFOL 30 MG: 10 INJECTION, EMULSION INTRAVENOUS at 17:28

## 2023-12-11 RX ADMIN — HEPARIN SODIUM 5000 UNITS: 1000 INJECTION INTRAVENOUS; SUBCUTANEOUS at 08:00

## 2023-12-11 RX ADMIN — HEPARIN SODIUM 3000 UNITS: 1000 INJECTION INTRAVENOUS; SUBCUTANEOUS at 08:16

## 2023-12-11 RX ADMIN — PHENYLEPHRINE HYDROCHLORIDE 100 MCG: 10 INJECTION INTRAVENOUS at 18:00

## 2023-12-11 RX ADMIN — FENTANYL CITRATE 50 MCG: 50 INJECTION, SOLUTION INTRAMUSCULAR; INTRAVENOUS at 08:08

## 2023-12-11 RX ADMIN — MIDAZOLAM 1 MG: 1 INJECTION INTRAMUSCULAR; INTRAVENOUS at 07:46

## 2023-12-11 RX ADMIN — PROPOFOL INJECTABLE EMULSION 50 MCG/KG/MIN: 10 INJECTION, EMULSION INTRAVENOUS at 07:23

## 2023-12-11 RX ADMIN — HYDROCODONE BITARTRATE AND ACETAMINOPHEN 1 TABLET: 5; 325 TABLET ORAL at 22:11

## 2023-12-11 RX ADMIN — PHENYLEPHRINE HYDROCHLORIDE 100 MCG: 10 INJECTION INTRAVENOUS at 08:03

## 2023-12-11 RX ADMIN — PHENYLEPHRINE HYDROCHLORIDE 100 MCG: 10 INJECTION INTRAVENOUS at 08:25

## 2023-12-11 RX ADMIN — MIDAZOLAM 1 MG: 1 INJECTION INTRAMUSCULAR; INTRAVENOUS at 08:14

## 2023-12-11 RX ADMIN — ACETAMINOPHEN 650 MG: 325 TABLET, FILM COATED ORAL at 21:14

## 2023-12-11 RX ADMIN — PROTAMINE SULFATE 50 MG: 10 INJECTION, SOLUTION INTRAVENOUS at 08:41

## 2023-12-11 RX ADMIN — ASPIRIN 81 MG: 81 TABLET, COATED ORAL at 11:09

## 2023-12-11 RX ADMIN — HEPARIN SODIUM 2000 UNITS: 1000 INJECTION INTRAVENOUS; SUBCUTANEOUS at 08:08

## 2023-12-11 RX ADMIN — Medication 10 MG: at 07:35

## 2023-12-11 RX ADMIN — PHENYLEPHRINE HYDROCHLORIDE 100 MCG: 10 INJECTION INTRAVENOUS at 18:32

## 2023-12-11 RX ADMIN — Medication 10 MG: at 07:24

## 2023-12-11 RX ADMIN — FENTANYL CITRATE 25 MCG: 50 INJECTION, SOLUTION INTRAMUSCULAR; INTRAVENOUS at 07:35

## 2023-12-11 RX ADMIN — SODIUM CHLORIDE 60 ML/HR: 9 INJECTION, SOLUTION INTRAMUSCULAR; INTRAVENOUS; SUBCUTANEOUS at 06:57

## 2023-12-11 RX ADMIN — CEFAZOLIN 1 G: 1 INJECTION, POWDER, FOR SOLUTION INTRAMUSCULAR; INTRAVENOUS at 17:30

## 2023-12-11 RX ADMIN — MIDAZOLAM 2 MG: 1 INJECTION INTRAMUSCULAR; INTRAVENOUS at 07:12

## 2023-12-11 RX ADMIN — PHENYLEPHRINE HYDROCHLORIDE 0.5 MCG/KG/MIN: 10 INJECTION INTRAVENOUS at 18:04

## 2023-12-11 RX ADMIN — SODIUM CHLORIDE: 9 INJECTION, SOLUTION INTRAVENOUS at 17:15

## 2023-12-11 RX ADMIN — Medication 10 MG: at 07:12

## 2023-12-11 RX ADMIN — SODIUM CHLORIDE 30 ML/HR: 9 INJECTION, SOLUTION INTRAVENOUS at 11:09

## 2023-12-11 RX ADMIN — MIDAZOLAM 1 MG: 1 INJECTION INTRAMUSCULAR; INTRAVENOUS at 07:35

## 2023-12-11 NOTE — ANESTHESIA POSTPROCEDURE EVALUATION
Patient: Yordy Hebert    Procedure Summary       Date: 12/11/23 Room / Location: [facility] OR 13 / [facility] HYBRID OR    Anesthesia Start: 0703 Anesthesia Stop: 0913    Procedures:       Transfemoral Transcatheter Aortic Valve Replacement      TRANSFEMORAL TRANSCATHETER AORTIC VALVE REPLACEMENT (Chest) Diagnosis:       Aortic stenosis, severe      (Aortic stenosis, severe [I35.0])    Surgeons: Todd Boogie MD; Hemal Sloan MD Provider: Paxton So MD    Anesthesia Type: generalLety ASA Status: 4            Anesthesia Type: general, Lety    Vitals  Vitals Value Taken Time   /58 12/11/23 0912   Temp 97.4 °F (36.3 °C) 12/11/23 0912   Pulse 80 12/11/23 0913   Resp 14 12/11/23 0912   SpO2 96 % 12/11/23 0913   Vitals shown include unfiled device data.        Post Anesthesia Care and Evaluation    Patient location during evaluation: ICU  Patient participation: complete - patient participated  Level of consciousness: obtunded/minimal responses and awake  Pain score: 0 (See nurse's notes for pain score)  Pain management: adequate    Airway patency: patent  Anesthetic complications: No anesthetic complications  PONV Status: none  Cardiovascular status: acceptable and bradycardic  Respiratory status: acceptable and face mask  Hydration status: acceptable    Comments: Patient seen and examined postoperatively; vital signs stable; SpO2 greater than or equal to 90%; cardiopulmonary status stable; nausea/vomiting adequately controlled; pain adequately controlled; no apparent anesthesia complications; patient discharged from anesthesia care when discharge criteria were met    TEMP PACER

## 2023-12-11 NOTE — ANESTHESIA PREPROCEDURE EVALUATION
Anesthesia Evaluation     Patient summary reviewed and Nursing notes reviewed   NPO Solid Status: > 8 hours  NPO Liquid Status: > 8 hours           Airway   Mallampati: II  TM distance: >3 FB  Neck ROM: full  No difficulty expected  Dental - normal exam     Pulmonary - negative pulmonary ROS and normal exam    breath sounds clear to auscultation  (+) a smoker Abstained day of surgery,  Cardiovascular - negative cardio ROS and normal exam  Exercise tolerance: unable to assess    ECG reviewed  Rhythm: regular  Rate: normal    (+) hypertension, valvular problems/murmurs AS, CAD, cardiac stents unknown timeframe , PVD, hyperlipidemia    ROS comment: RBBB + LBBB    Neuro/Psych- negative ROS  (+) numbness  GI/Hepatic/Renal/Endo - negative ROS     Musculoskeletal     Abdominal  - normal exam   Substance History - negative use     OB/GYN negative ob/gyn ROS         Other - negative ROS  arthritis,     ROS/Med Hx Other: Diagnoses and all orders for this visit:     1. Aortic stenosis, severe (Primary)     2. Essential hypertension     3. Mixed hyperlipidemia     4. Chronic coronary artery disease  Overview:  Had MI on 2014 - [facility] emergent cath - had 2 stents        5. Peripheral vascular disease, unspecified     6. History of BPH     7. Abdominal aortic aneurysm (AAA) without rupture, unspecified part  Overview:  Had AAA repaired in '08 with a sleeve - repeat CT in 9/19 - no evidence of any leaking.         8. Pure hypercholesterolemia        Severe aortic stenosis  Echocardiogram shows EF of 55 to 60%, grade 1 diastolic dysfunction and severe aortic stenosis with aortic valve area 0.7 cm² and mean/peak gradient of 50/75 mmHg.  I have explained the natural history and therapeutic options to the patient.  I have also described TAVR in details using a heart model.  I have described the work-up that he will require prior to getting a TAVR.  We will proceed with cardiac catheterization and TAVR CTA.     Coronary artery  disease  Patient reports previous PCI with 2 stents in 2014  Continue with aspirin, statin, metoprolol  Currently chest pain-free     Hypertension  Well-controlled  Currently only on metoprolol  Changed metoprolol to XL     Hyperlipidemia  On pravastatin  LDL 79, HDL 54, triglyceride 92 and total cholesterol 150  Unable to tolerate high intensity statin due to myalgias.     Peripheral arterial disease  AAA status post repair  CT scan shows CT shows aortobiiliac bypass graft appears patent without significant  stenosis.  Left greater than right SFA and popliteal artery disease, likely  hemodynamically significant on the left.  Moderate multifocal infrapopliteal disease with two-vessel runoff bilaterally  Bilateral ZARI normal  Focus on hypertension and heart rate control with beta-blocker  Continue statin     BPH   Continue tamsulosin                  Anesthesia Plan    ASA 4     general and Lety     (Sedation, )  intravenous induction     Anesthetic plan, risks, benefits, and alternatives have been provided, discussed and informed consent has been obtained with: patient.    CODE STATUS:

## 2023-12-11 NOTE — ANESTHESIA PREPROCEDURE EVALUATION
Anesthesia Evaluation     Patient summary reviewed and Nursing notes reviewed   NPO Solid Status: > 8 hours  NPO Liquid Status: > 2 hours           Airway   Mallampati: III  TM distance: >3 FB  Neck ROM: limited  Dental    (+) poor dentition    Pulmonary - normal exam   (+) ,shortness of breath  Cardiovascular   Exercise tolerance: poor (<4 METS)    ECG reviewed  Rhythm: irregular  Rate: abnormal    (+) pacemaker (Currently transvenous paced) pacemaker, hypertension well controlled, valvular problems/murmurs (s/p TAVR 12/11/23) AS, CAD, dysrhythmias (Complete heart block), orthopnea, UREÑA, hyperlipidemia      Neuro/Psych  (+) numbness  GI/Hepatic/Renal/Endo      Musculoskeletal     (+) arthralgias  Abdominal   (+) scaphoid   Substance History      OB/GYN          Other   arthritis,     ROS/Med Hx Other: EF > 55%              Anesthesia Plan    ASA 4     general     intravenous induction     Anesthetic plan, risks, benefits, and alternatives have been provided, discussed and informed consent has been obtained with: patient.    Use of blood products discussed with patient .    Plan discussed with CRNA.    CODE STATUS:    Level Of Support Discussed With: Patient  Code Status (Patient has no pulse and is not breathing): CPR (Attempt to Resuscitate)  Medical Interventions (Patient has pulse or is breathing): Full Support

## 2023-12-11 NOTE — OP NOTE
Indication:  Severe symptomatic nonrheumatic aortic stenosis  Congestive heart failure, NYHA class III    Procedures performed  Ultrasound-guided vascular access  Common femoral angiography  Balloon aortic valvuloplasty  Supravalvular aortogram  Temporary pacemaker placement  Transcatheter aortic valve replacement with 34 mm Evolut pro plus via left femoral arterial access  Aorto abdominal aortogram with bilateral iliofemoral runoff  Perclose and Mynx deployment    Procedural Details  The procedure was performed under conscious sedation in the hybrid OR.    Under ultrasound guidance, a 6 F introducer was placed in the right femoral vein using modified Seldinger technique. Under ultrasound guidance, a 7 F introducer was placed in the left femoral artery using modified Seldinger technique. Under ultrasound guidance, a 7 F introducer was placed in the right femoral artery using modified Seldinger technique.    Two Perclose ProGlide devices were used to 'pre-close' the LFA access site. A 5F bipolar electrode temporary pacemaking wire was inserted via the right femoral venous sheath and positioned using fluoroscopy. Pacing threshold was tested. The temporary pacemaker wire was secured in place.    A 6F pigtail catheter was placed in the aortic root via the RFA sheath. The aortic valve was crossed retrograde with a 0.035 inch straight wire through a 6F AL1 catheter via the LFA 18Fr sheath. The AL1 catheter was advanced into the LV and a exchange length 0.035 inch lunderquist wire in the LV.  We then performed a balloon aortic valvuloplasty under rapid pacing at 180 bpm with a 20 x 45 mm true balloon.    The 18F sheath was removed and the Hydrocapsule delivery sheath was inserted over the lunderquist wire.    The TAVR procedure was then performed using a 34 mm Evolut pro + device. It was advanced over the lunderquist wire to the aortic valve position. After confirming correct position of the valve by fluoroscopy, the valve  was implanted with ventricular pacing and dynamic aortography.   After deployment we noted an insole in the valve frame causing significant constraint in the valve.  We then advanced a 24 x 45 mm true balloon and performed postdilatation balloon aortic valvuloplasty under rapid pacing at 180 bpm.  The patient at this time developed complete heart block and we performed backup pacing at 80 bpm.    After valve deployment the valve was well-seated and there was no significant paravalvular leak noted.  This was confirmed by fluoroscopic views as well as echocardiogram.  Final gradient across the valve was 2 mmHg.    At the end of the procedure, the Medtronic delivery system was removed over a guidewire. The Perclose devices were deployed to secure the arterial access on the TAVR side which was left femoral access.    Distal aortic, iliac and femoral angiography was performed with no evidence of dissection, aneurysm or vascular injury.    The 7F RFA sheath was removed and the arteriotomy was closed with a Mynx closure device. The 6F RFV sheath along with the temporary pacemaker wire were left in place in anticipation for permanent pacemaker placement later today.  The patient was transferred to [facility]  in stable condition.    Lee Boogie and Nathalia performed the TAVR procedure. Dr. Jalloh assisted.    Estimated blood loss  20 mL    Complications  Complete heart block requiring permanent pacemaker placement.    Recommendations  Permanent pacemaker placement   Dual antiplatelet therapy  Cardiac rehab  Repeat echocardiogram and ECG in morning.    Electronically signed by Todd Boogie MD, 12/11/23, 9:14 AM EST.

## 2023-12-11 NOTE — ANESTHESIA PROCEDURE NOTES
Central Line      Patient reassessed immediately prior to procedure    Patient location during procedure: OR  Start time: 12/11/2023 7:38 AM  Stop Time:12/11/2023 7:48 AM  Indications: central pressure monitoring, vascular access and MD/Surgeon request  Staff  Anesthesiologist: Paxton So MD  Preanesthetic Checklist  Completed: patient identified, IV checked, site marked, risks and benefits discussed, surgical consent, monitors and equipment checked, pre-op evaluation and timeout performed  Central Line Prep  Sterile Tech:cap, gloves, gown, mask and sterile barriers  Prep: chloraprep  Patient monitoring: blood pressure monitoring, continuous pulse oximetry and EKG  Central Line Procedure  Laterality:right  Location:internal jugular  Catheter Type:Cordis and double lumen  Catheter Size:9 Fr  Guidance:ultrasound guided  PROCEDURE NOTE/ULTRASOUND INTERPRETATION.  Using ultrasound guidance the potential vascular sites for insertion of the catheter were visualized to determine the patency of the vessel to be used for vascular access.  After selecting the appropriate site for insertion, the needle was visualized under ultrasound being inserted into the internal jugular vein, followed by ultrasound confirmation of wire and catheter placement. There were no abnormalities seen on ultrasound; an image was taken; and the patient tolerated the procedure with no complications. Images: still images obtained, printed/placed on chart  Assessment  Post procedure:biopatch applied, line sutured and occlusive dressing applied  Assessement:blood return through all ports, free fluid flow and Bradley Test  Complications:no  Patient Tolerance:patient tolerated the procedure well with no apparent complications

## 2023-12-11 NOTE — DISCHARGE INSTRUCTIONS
TAVR Discharge Instructions    Medications  Take all of the medications prescribed to you. When you go home, you may be prescribed different medications than what you were taking at home prior to your procedure.  Your medications may be adjusted further by the valve clinic or your primary cardiologist at your follow-up appointment.  If you need to stop taking you medications, please notify your physician.  Antiplatelet medications (blood thinners) are usually prescribed to prevent blood clots from forming on your new valve which could cause a stroke. These medications increase your chance of bleeding. Please notify your cardiologist of any of the following signs of bleeding:  black, tarry stools, red/pink urine, black or dark vomit, nose bleeds, increased bruising, or bleeding gums.     Diet  A cardiac (low-fat, low-salt) diet is recommended. Avoid a higher sodium (salt) diet; high levels of sodium can lead to fluid retention and may cause congestive heart failure.     TAVR site incision care  Keep incision sites clean and dry. You may remove your dressings and take a shower when you are home.   Clean your incisions with normal soap and water. Pat your incisions dry with a clean towel. Do not rub them. Do not soak or lay in water until all incisions are completely healed. Do not apply lotion, cream, powder, or ointment to the incision until the scab has fallen off.  If drainage occurs, cover the incision with dry gauze and change the gauze at least twice a day, or more often if needed.  Bruising is common around the incision site, and you may notice a pea-sized lump. This is normal and usually disappears within a few weeks.  Look at your incision every day. Call your doctor's office right away if you notice any of the following signs of infection:  pain, redness, swelling, drainage, bleeding, chills, or a fever of 100.4° or higher.    Discomfort  Mild amounts of discomfort after your procedure is expected and may  continue for a few weeks. Often, patients can obtain adequate pain relief from taking acetaminophen (Tylenol).   A sore, scratchy throat can occur from the insertion of your breathing tube and may last for several weeks. You may also notice some hoarseness in your voice. This should improve by your follow-up appointment.     Swelling  Elevate your legs 2-3 times daily for 30-60 minutes. Avoid sitting for prolonged periods of time.   If you have been prescribed a diuretic (water pill), you should weigh yourself every day and record it. Weigh yourself at the same time of day wearing same amount of clothing and call your doctor's office with any weight gain or loss of 3 pounds per day or 5 pounds in a week.     Activity & Cardiac Rehab  Walk 3-4 times per day. Pace your activities, increase gradually. Daily exercise and adequate rest are important.  Climb stairs slowly as tolerated.  Sexual activity can be resumed in 2-3 weeks after being discharged or when you can climb a flight of stairs without becoming short of breath.  No lifting, pushing, or pulling objects heavier than 10 pounds for 1 week after your procedure.  Driving can be resumed in one week after release from hospital.  You may return to work one week after surgery or as directed by your cardiologist, depending on your needs and type of work.     Notify your cardiologist if you develop  Fever and chills with temperature 100.4° or higher  Shortness of breath  Dizziness or fainting spells  Increased swelling in feet and ankles  Prolonged increase in fatigue, weakness, nausea or vomiting  Irregular or rapid heart rate  If you have questions regarding your cardiac medications     SEEK CARE IMMEDIATELY (CALL 911) IF YOU EXPERIENCE  Sudden decrease in strength and sensation (numbness and tingling)  A change in skin color or temperature (coolness to touch) of the legs or arms  Sudden swelling, bleeding, and/or acute pain in one of your groins  Sudden onset of  chest, back, or abdominal pain  Sudden shortness of breath    Follow up appointments  Follow-up appointments with your doctor help to make sure you are recovering well. You will be required to be seen in the office for a one month follow-up. At the one month follow-up, you will have an echocardiogram to ensure that your valve is functioning correctly.  You will be required to be seen in the doctor's office again at a one year appointment with another echocardiogram.   Please notify your cardiologist/TAVR heart team if you are hospitalized within the first year.     Smoking  It is highly recommended that you refrain from smoking.    Dental visits  After having a TAVR, try to avoid routine cleanings or non-emergent work on your teeth for 3 months following your procedure. An infection known as bacterial endocarditis is an ongoing potential complication after heart valve surgery. Bacterial endocarditis can damage your heart valve.  If you need to have a dental cleaning or any other dental work, you will need to take an antibiotic 1 hour prior to your procedure. Please call Dr. Boogie's office to get an antibiotic prescribed [phone].        **Please measure your blood pressure and heart rate daily. Keep a log and bring to your follow up appointment with Dr. Boogie.         Discharge Instructions for Permanent Pacemaker after TAVR    Some patients have changes in the heart rhythms such as slowing of their heart rate that requires a permanent pacemaker.     It is important to keep your pacemaker site clean. Wash daily with soap and water, then pat dry with a clean towel. If you have Steri-strips over the incision, leave them in place. They will be removed at your follow-up appointment.    For two weeks, DO NOT raise your affected arm above your head. Keep your elbow below your shoulder.    No baths or swimming until seen by your doctor.    You will need to see the doctor 1-2 weeks after implantation. If you are not  given an appointment at discharge, you should call and make an appointment.    Call your cardiologist with any of the following:  swelling, changes to your pacemaker incision, fever of 100.4 or higher, chills, dizziness, lightheadedness, or drainage or bleeding from the incision.

## 2023-12-11 NOTE — ANESTHESIA PROCEDURE NOTES
Arterial Line      Patient reassessed immediately prior to procedure    Patient location during procedure: OR  Start time: 12/11/2023 7:15 AM  Stop Time:12/11/2023 7:25 AM       Line placed for hemodynamic monitoring.  Performed By   Anesthesiologist: Paxton So MD   Preanesthetic Checklist  Completed: patient identified, IV checked, site marked, risks and benefits discussed, surgical consent, monitors and equipment checked, pre-op evaluation and timeout performed  Arterial Line Prep    Sterile Tech: gloves, cap and mask  Prep: ChloraPrep  Patient monitoring: continuous pulse oximetry, EKG and blood pressure monitoring  Arterial Line Procedure   Laterality:left  Location:  radial artery  Catheter size: 20 G   Guidance: ultrasound guided and palpation technique  Number of attempts: 1  Successful placement: yes   Post Assessment   Dressing Type: occlusive dressing applied, secured with tape and wrist guard applied.   Complications no  Circ/Move/Sens Assessment: normal.   Patient Tolerance: patient tolerated the procedure well with no apparent complications

## 2023-12-11 NOTE — H&P
Referring Provider: Todd Boogie MD          Patient Care Team:  Garima Jain MD as PCP - General (Family Medicine)  Raghav Doe MD as Consulting Physician (Cardiology)      SUBJECTIVE     Chief Complaint: Here for TAVR    History of present illness:  Yordy Hebert is a 83 y.o. male with history of CAD, hypertension, hyperlipidemia, PVD, AAA status postrepair status post severe aortic stenosis who presents for TAVR.   he has chronic back problems for which he gets pain shots occasionally and also takes hydrocodone.  He mentions he has had 2 stents placed in 2014 but he does not have a stent card with him today.  He has not had any repeated episodes of angina since that MI.   An echocardiogram has shown severe aortic stenosis with mean gradient of 50 mmHg.  EKG shows normal sinus rhythm, with occasional PVCs and left axis deviation.  Rate of 61 bpm.  No change compared to prior EKG.    He is in NYHA class III    Review of systems:    Constitutional: No weakness, fatigue, fever, rigors, chills   Eyes: No vision changes, eye pain   ENT/oropharynx: No difficulty swallowing, sore throat, epistaxis, changes in hearing   Cardiovascular: No chest pain, chest tightness, palpitations, paroxysmal nocturnal dyspnea, orthopnea, diaphoresis, dizziness / syncopal episode   Respiratory: + shortness of breath, dyspnea on exertion, cough, wheezing, hemoptysis   Gastrointestinal: No abdominal pain, nausea, vomiting, diarrhea, bloody stools   Genitourinary: No hematuria, dysuria   Neurological: No headache, tremors, numbness, one-sided weakness    Musculoskeletal: No cramps, myalgias, joint pain, joint swelling   Integument: No rash, edema        Personal History:      Past Medical History:   Diagnosis Date    Coronary artery disease     DJD (degenerative joint disease)     (L) hip    Family history of abdominal aortic aneurysm (AAA) repair 1/31/2012    Foot pain, left 4/13/2017    History of AAA (abdominal aortic  aneurysm) repair     Hyperlipidemia     Hypertension     Inguinal hernia     right    Prostatitis        Past Surgical History:   Procedure Laterality Date    ABDOMINAL AORTIC ANEURYSM REPAIR      BACK SURGERY  11/17/2016    CARDIAC CATHETERIZATION      CARDIAC CATHETERIZATION Right 11/16/2023    Procedure: Left Heart Cath with Coronary Angiography;  Surgeon: Todd Boogie MD;  Location: [facility] CATH INVASIVE LOC[facility];  Service: Cardiovascular;  Laterality: Right;    CATARACT EXTRACTION, BILATERAL      CORONARY ANGIOPLASTY WITH STENT PLACEMENT      INGUINAL HERNIA REPAIR      PROSTATE SURGERY      RENAL ARTERY STENT Left 06/11/2008       Family History   Family history unknown: Yes       Social History     Tobacco Use    Smoking status: Every Day     Packs/day: 0.25     Years: 62.00     Additional pack years: 0.00     Total pack years: 15.50     Types: Cigarettes     Start date: 1959     Passive exposure: Current    Smokeless tobacco: Never   Vaping Use    Vaping Use: Never used   Substance Use Topics    Alcohol use: No    Drug use: Never        Home meds:  Prior to Admission medications    Medication Sig Start Date End Date Taking? Authorizing Provider   aspirin (aspirin) 81 MG EC tablet Take 1 tablet by mouth Daily. 2/6/12  Yes Provider, MD Yevgeniy   HYDROcodone-acetaminophen (NORCO)  MG per tablet Take 1 tablet by mouth Every 12 (Twelve) Hours As Needed. for pain 10/7/22  Yes Alphonse Haas MD   metoprolol succinate XL (TOPROL-XL) 25 MG 24 hr tablet Take 0.5 tablets by mouth Daily. 3/7/23  Yes Nadia Jalloh MD   pravastatin (PRAVACHOL) 80 MG tablet Take 1 tablet by mouth Every Night. 11/22/23  Yes Garima Jain MD       Allergies:     Patient has no known allergies.    Scheduled Meds:ceFAZolin, 2,000 mg, Intravenous, Once  sodium chloride, 10 mL, Intravenous, Q12H      Continuous Infusions:   PRN Meds:  [MAR Hold] Chlorhexidine Gluconate Cloth    sodium chloride    sodium  "chloride      OBJECTIVE    Vital Signs  Vitals:    12/11/23 0512   BP: 138/69   BP Location: Left arm   Patient Position: Lying   Pulse: 67   Resp: 16   Temp: 97.6 °F (36.4 °C)   TempSrc: Oral   SpO2: 97%   Weight: 67.4 kg (148 lb 9.4 oz)   Height: 172.7 cm (68\")       Flowsheet Rows      Flowsheet Row First Filed Value   Admission Height 172.7 cm (68\") Documented at 12/11/2023 0512   Admission Weight 67.4 kg (148 lb 9.4 oz) Documented at 12/11/2023 0512            No intake or output data in the 24 hours ending 12/11/23 0730         Physical Exam:  The patient is alert, oriented and in no distress.  Vital signs as noted above.  Head and neck revealed no carotid bruits or jugular venous distention.  No thyromegaly or lymphadenopathy is present  Lungs clear.  No wheezing.  Breath sounds are normal bilaterally.  Heart: Normal first and second heart sounds.  Systolic ejection murmur.  No precordial rub is present.  No gallop is present.  Abdomen: Soft and nontender.  No organomegaly is present.  Extremities with good peripheral pulses without any pedal edema.  Skin: Warm and dry.  Musculoskeletal system is grossly normal.  CNS grossly normal.       Results Review:  I have personally reviewed the results from the time of this admission to 12/11/2023 07:30 EST and agree with these findings:  []  Laboratory  []  Microbiology  []  Radiology  []  EKG/Telemetry   []  Cardiology/Vascular   []  Pathology  []  Old records  []  Other:    Most notable findings include:     Lab Results (last 24 hours)       Procedure Component Value Units Date/Time    POC Glucose Once [[id number]]  (Normal) Collected: 12/11/23 0601    Specimen: Blood Updated: 12/11/23 0602     Glucose 105 mg/dL      Comment: Serial Number: [id number]:  [id number]               Imaging Results (Last 24 Hours)       Procedure Component Value Units Date/Time    Hybrid Vascular OR Imaging (Autofinalize) [[id number]] Resulted: 12/11/23 0644     Updated: 12/11/23 " 0644    Hybrid Vascular OR Imaging (Autofinalize) [[id number]] Resulted: 12/11/23 0625     Updated: 12/11/23 0625            LAB RESULTS (LAST 7 DAYS)    CBC  Results from last 7 days   Lab Units 12/07/23  0829   WBC 10*3/mm3 3.70   RBC 10*6/mm3 4.33   HEMOGLOBIN g/dL 13.6   HEMATOCRIT % 41.0   MCV fL 94.6   PLATELETS 10*3/mm3 162       BMP  Results from last 7 days   Lab Units 12/07/23  0829   SODIUM mmol/L 139   POTASSIUM mmol/L 3.9   CHLORIDE mmol/L 99   CO2 mmol/L 31.0*   BUN mg/dL 19   CREATININE mg/dL 0.74*   GLUCOSE mg/dL 117*       CMP   Results from last 7 days   Lab Units 12/07/23  0829   SODIUM mmol/L 139   POTASSIUM mmol/L 3.9   CHLORIDE mmol/L 99   CO2 mmol/L 31.0*   BUN mg/dL 19   CREATININE mg/dL 0.74*   GLUCOSE mg/dL 117*   ALBUMIN g/dL 4.4   BILIRUBIN mg/dL 0.8   ALK PHOS U/L 92   AST (SGOT) U/L 20   ALT (SGPT) U/L 14       BNP        TROPONIN        CoAg  Results from last 7 days   Lab Units 12/07/23  0829   INR  1.00       Creatinine Clearance  Estimated Creatinine Clearance: 70.8 mL/min (A) (by C-G formula based on SCr of 0.74 mg/dL (L)).    ABG          Radiology  No radiology results for the last day      EKG  I personally viewed and interpreted the patient's EKG/Telemetry data:  No orders to display         Echocardiogram:          Stress Test:        Cardiac Catheterization:  Results for orders placed during the hospital encounter of 11/16/23    Cardiac Catheterization/Vascular Study    Narrative  OPERATORS  Todd Boogie M.D. (Attending Cardiologist)      PROCEDURE PERFORMED  Ultrasound guided vascular access  Right heart Catheterization  Left Heart Catheterization  Coronary Angiogram 32843  Moderate Sedation 40 minutes      INDICATIONS FOR PROCEDURE  81-year-old man who has been diagnosed with severe symptomatic aortic stenosis.  He came for pre-TAVR cardiac catheterization.  After discussing the risk and benefit of the procedure he was brought to the Cath Lab.    PROCEDURE IN  DETAIL  Informed consent was obtained from the patient after explaining the risks, benefits, and alternative options of the procedure. After obtaining informed consent, the patient was brought to the cath lab and was prepped in a sterile fashion. Lidocaine 1% was used for local anesthesia into the right IJ access site. Right IJ vein was accessed using the micropuncture needle under ultrasound guidance and micropuncture wire advanced under flouroscopy. A 7 Malian vascular sheath was put into place percutaneously over guide-wire. Guide wires were removed. A 7Fr swan elias catheter was advanced to wedge position. RA, RV and PA and wedge pressures were recorded. PA sat and arterial sats recorded and the swan was subsequently removed in its entirety. Next, The right radial artery was accessed with an angiocath needle under ultrasound guidance. A 6F slendersheath was inserted successfully.  Afterwards, 6F JR4 diagnostic catheter was advanced over a wire into the ascending aorta and was used to cross the AV and obtain LV pressures.  Gradient across the AV measured via pullback technique.  Next, 6F JL3.5 and JR4 catheters were used to engage the ostia of the left main and RCA respectively. Images of the right and left coronary systems were obtained. The catheters were exchanged over a wire and subsequently removed. The patient tolerated the procedure well without any complications. The pictures were reviewed at the end of the procedure. A TR band was applied.    CORONARY ANGIOGRAM FINDINGS:  Dominance: Right    #Left main: Left main is a large vessel Which gives rise to the LAD left circumflex artery.  Left main is angiographically free from any significant disease.    #Left Anterior Descending Artery: LAD is a large caliber vessel which gives rise to several septal perforators and several diagonal branches.  After origin LAD bifurcates into dual LAD.  Medial LAD is diminutive and gives rise to several septal branches.   Lateral LAD is a dominant vessel which appears to have 70 to 80% stenosis in the proximal segment.  Mid lateral LAD also has tandem 50% stenosis in the midsegment.  These lesions are unchanged when compared to cardiac catheterization from 2014 and 2016.    #Left Circumflex: The LCx is a large, non-dominant vessel which gives rise to OM branches.  Mid left circumflex has 40-50% stenosis.  This is unchanged when compared to cardiac catheterization from 2014 and 2016.    #Right Coronary Artery: The RCA is a large, dominant vessel which gives rise to several small caliber branches and the PDA and PLV.  There is a patent stent in proximal RCA.  Remaining RCA has diffuse luminal irregularities.      [facility] HEMODYNAMICS:  LVp 200/10, 14 mmHg  AOp 120/76, 95 mmHg  Peak to peak gradient of 80 mmHg across the aortic valve.  LV gram was not performed due to recent echocardiogram    [facility] HEMODYNAMICS:  RA 7/7, 7 mmHg  RV 29/7, 9 mmHg  PA 27/13, 21 mmHg  PCW 9/8, 7 mmHg  AO Sat 94%  PA Sat 60%    Rahul CO 3.72 liters per minute    Rahul CI 2.05 liters per minute per meter square    ESTIMATED BLOOD LOSS:  10 ml    COMPLICATIONS:  None    PROCEDURE DATA:  Contrast Used:40 cc  Fluoro Time: 3.12 mins  Sedation Time:    IMPRESSIONS  70 to 80% proximal LAD disease which is unchanged when compared to previous cardiac catheterization from 2014 in 2016.  Severe aortic stenosis  Normal wedge pressure and EDP    RECOMMENDATIONS  -- Proceed with TAVR  -- Medical management of stable coronary disease  -- Evaluation with cardiac surgery for possible CABG/AVR vs TAVR.    Electronically signed by Todd oBogie MD, 11/16/23, 2:46 PM EST.        Other:      ASSESSMENT & PLAN:    Principal Problem:    Aortic stenosis, severe    Severe aortic stenosis  Echocardiogram shows EF of 55 to 60%, grade 1 diastolic dysfunction and severe aortic stenosis with aortic valve area 0.7 cm² and mean/peak gradient of 50/75 mmHg.  Heart team evaluation has been completed  and he has been offered femoral TAVR.  Plan is to cautiously proceed with left transfemoral TAVR and if this is unsuccessful he will be offered a right carotid TAVR.     Coronary artery disease  Patient reports previous PCI with 2 stents in 2014  Continue with aspirin, statin, metoprolol  Currently chest pain-free     Hypertension  Well-controlled  Currently only on metoprolol  Changed metoprolol to XL     Hyperlipidemia  On pravastatin  LDL 79, HDL 54, triglyceride 92 and total cholesterol 150  Unable to tolerate high intensity statin due to myalgias.     Peripheral arterial disease  AAA status post repair  CT scan shows CT shows aortobiiliac bypass graft appears patent without significant  stenosis.  Left greater than right SFA and popliteal artery disease, likely hemodynamically significant on the left.  Moderate multifocal infrapopliteal disease with two-vessel runoff bilaterally  Bilateral ZARI normal  Focus on hypertension and heart rate control with beta-blocker  Continue statin     BPH   Continue tamsulosin    Todd Boogie MD  12/11/23  07:30 EST

## 2023-12-12 ENCOUNTER — READMISSION MANAGEMENT (OUTPATIENT)
Dept: CALL CENTER | Facility: HOSPITAL | Age: 83
End: 2023-12-12
Payer: MEDICARE

## 2023-12-12 ENCOUNTER — APPOINTMENT (OUTPATIENT)
Dept: CARDIOLOGY | Facility: HOSPITAL | Age: 83
DRG: 267 | End: 2023-12-12
Payer: MEDICARE

## 2023-12-12 VITALS
DIASTOLIC BLOOD PRESSURE: 53 MMHG | OXYGEN SATURATION: 94 % | BODY MASS INDEX: 24.4 KG/M2 | RESPIRATION RATE: 20 BRPM | WEIGHT: 161 LBS | SYSTOLIC BLOOD PRESSURE: 91 MMHG | HEIGHT: 68 IN | HEART RATE: 81 BPM | TEMPERATURE: 97.8 F

## 2023-12-12 LAB
ANION GAP SERPL CALCULATED.3IONS-SCNC: 10 MMOL/L (ref 5–15)
BH CV ECHO MEAS - AI P1/2T: 367 MSEC
BH CV ECHO MEAS - AO MAX PG: 5.1 MMHG
BH CV ECHO MEAS - AO MEAN PG: 3 MMHG
BH CV ECHO MEAS - AO V2 MAX: 113 CM/SEC
BH CV ECHO MEAS - AO V2 VTI: 21.4 CM
BUN SERPL-MCNC: 12 MG/DL (ref 8–23)
BUN/CREAT SERPL: 18.8 (ref 7–25)
CALCIUM SPEC-SCNC: 8.9 MG/DL (ref 8.6–10.5)
CHLORIDE SERPL-SCNC: 103 MMOL/L (ref 98–107)
CO2 SERPL-SCNC: 25 MMOL/L (ref 22–29)
CREAT SERPL-MCNC: 0.64 MG/DL (ref 0.76–1.27)
DEPRECATED RDW RBC AUTO: 47.3 FL (ref 37–54)
EGFRCR SERPLBLD CKD-EPI 2021: 93.4 ML/MIN/1.73
ERYTHROCYTE [DISTWIDTH] IN BLOOD BY AUTOMATED COUNT: 14.5 % (ref 12.3–15.4)
GLUCOSE SERPL-MCNC: 102 MG/DL (ref 65–99)
HCT VFR BLD AUTO: 36.2 % (ref 37.5–51)
HGB BLD-MCNC: 12.1 G/DL (ref 13–17.7)
MAGNESIUM SERPL-MCNC: 2 MG/DL (ref 1.6–2.4)
MCH RBC QN AUTO: 31.4 PG (ref 26.6–33)
MCHC RBC AUTO-ENTMCNC: 33.5 G/DL (ref 31.5–35.7)
MCV RBC AUTO: 93.8 FL (ref 79–97)
PLATELET # BLD AUTO: 115 10*3/MM3 (ref 140–450)
PMV BLD AUTO: 7.2 FL (ref 6–12)
POTASSIUM SERPL-SCNC: 4 MMOL/L (ref 3.5–5.2)
RBC # BLD AUTO: 3.86 10*6/MM3 (ref 4.14–5.8)
SODIUM SERPL-SCNC: 138 MMOL/L (ref 136–145)
WBC NRBC COR # BLD AUTO: 4.9 10*3/MM3 (ref 3.4–10.8)

## 2023-12-12 PROCEDURE — 25010000002 CEFAZOLIN PER 500 MG: Performed by: INTERNAL MEDICINE

## 2023-12-12 PROCEDURE — 99024 POSTOP FOLLOW-UP VISIT: CPT | Performed by: INTERNAL MEDICINE

## 2023-12-12 PROCEDURE — 80048 BASIC METABOLIC PNL TOTAL CA: CPT | Performed by: INTERNAL MEDICINE

## 2023-12-12 PROCEDURE — 85027 COMPLETE CBC AUTOMATED: CPT | Performed by: INTERNAL MEDICINE

## 2023-12-12 PROCEDURE — 93308 TTE F-UP OR LMTD: CPT

## 2023-12-12 PROCEDURE — 93325 DOPPLER ECHO COLOR FLOW MAPG: CPT

## 2023-12-12 PROCEDURE — 97161 PT EVAL LOW COMPLEX 20 MIN: CPT

## 2023-12-12 PROCEDURE — 93308 TTE F-UP OR LMTD: CPT | Performed by: INTERNAL MEDICINE

## 2023-12-12 PROCEDURE — 93321 DOPPLER ECHO F-UP/LMTD STD: CPT | Performed by: INTERNAL MEDICINE

## 2023-12-12 PROCEDURE — 93321 DOPPLER ECHO F-UP/LMTD STD: CPT

## 2023-12-12 PROCEDURE — 93325 DOPPLER ECHO COLOR FLOW MAPG: CPT | Performed by: INTERNAL MEDICINE

## 2023-12-12 PROCEDURE — 99232 SBSQ HOSP IP/OBS MODERATE 35: CPT | Performed by: THORACIC SURGERY (CARDIOTHORACIC VASCULAR SURGERY)

## 2023-12-12 PROCEDURE — 63710000001 DIPHENHYDRAMINE PER 50 MG: Performed by: INTERNAL MEDICINE

## 2023-12-12 PROCEDURE — 99239 HOSP IP/OBS DSCHRG MGMT >30: CPT | Performed by: INTERNAL MEDICINE

## 2023-12-12 PROCEDURE — 83735 ASSAY OF MAGNESIUM: CPT | Performed by: INTERNAL MEDICINE

## 2023-12-12 RX ORDER — METOPROLOL SUCCINATE 25 MG/1
25 TABLET, EXTENDED RELEASE ORAL
Status: DISCONTINUED | OUTPATIENT
Start: 2023-12-12 | End: 2023-12-12 | Stop reason: HOSPADM

## 2023-12-12 RX ORDER — HYDROCODONE BITARTRATE AND ACETAMINOPHEN 5; 325 MG/1; MG/1
1 TABLET ORAL EVERY 6 HOURS PRN
Qty: 10 TABLET | Refills: 0 | Status: SHIPPED | OUTPATIENT
Start: 2023-12-12 | End: 2023-12-19

## 2023-12-12 RX ORDER — DIPHENHYDRAMINE HCL 25 MG
50 CAPSULE ORAL NIGHTLY PRN
Status: DISCONTINUED | OUTPATIENT
Start: 2023-12-12 | End: 2023-12-12 | Stop reason: HOSPADM

## 2023-12-12 RX ORDER — METOPROLOL SUCCINATE 25 MG/1
25 TABLET, EXTENDED RELEASE ORAL DAILY
Qty: 90 TABLET | Refills: 3 | Status: SHIPPED | OUTPATIENT
Start: 2023-12-12

## 2023-12-12 RX ORDER — NALOXONE HYDROCHLORIDE 4 MG/.1ML
SPRAY NASAL
Qty: 2 EACH | Refills: 0 | Status: SHIPPED | OUTPATIENT
Start: 2023-12-12 | End: 2023-12-21

## 2023-12-12 RX ORDER — CEPHALEXIN 500 MG/1
500 CAPSULE ORAL 3 TIMES DAILY
Qty: 15 CAPSULE | Refills: 0 | Status: SHIPPED | OUTPATIENT
Start: 2023-12-12 | End: 2023-12-17

## 2023-12-12 RX ORDER — ATORVASTATIN CALCIUM 10 MG/1
10 TABLET, FILM COATED ORAL NIGHTLY
Qty: 90 TABLET | Refills: 3 | Status: SHIPPED | OUTPATIENT
Start: 2023-12-12

## 2023-12-12 RX ORDER — CLOPIDOGREL BISULFATE 75 MG/1
75 TABLET ORAL DAILY
Qty: 90 TABLET | Refills: 3 | Status: SHIPPED | OUTPATIENT
Start: 2023-12-12

## 2023-12-12 RX ORDER — CEPHALEXIN 500 MG/1
500 CAPSULE ORAL EVERY 12 HOURS SCHEDULED
Status: DISCONTINUED | OUTPATIENT
Start: 2023-12-12 | End: 2023-12-12 | Stop reason: HOSPADM

## 2023-12-12 RX ADMIN — CEFAZOLIN 1 G: 1 INJECTION, POWDER, FOR SOLUTION INTRAMUSCULAR; INTRAVENOUS at 01:23

## 2023-12-12 RX ADMIN — HYDROCODONE BITARTRATE AND ACETAMINOPHEN 1 TABLET: 5; 325 TABLET ORAL at 13:13

## 2023-12-12 RX ADMIN — DIPHENHYDRAMINE HYDROCHLORIDE 50 MG: 25 CAPSULE ORAL at 00:14

## 2023-12-12 RX ADMIN — METOPROLOL SUCCINATE 25 MG: 25 TABLET, EXTENDED RELEASE ORAL at 10:19

## 2023-12-12 RX ADMIN — ASPIRIN 81 MG: 81 TABLET, COATED ORAL at 10:19

## 2023-12-12 RX ADMIN — HYDROCODONE BITARTRATE AND ACETAMINOPHEN 1 TABLET: 5; 325 TABLET ORAL at 04:30

## 2023-12-12 RX ADMIN — CEPHALEXIN 500 MG: 500 CAPSULE ORAL at 11:40

## 2023-12-12 RX ADMIN — CLOPIDOGREL BISULFATE 75 MG: 75 TABLET ORAL at 10:19

## 2023-12-12 RX ADMIN — CEFAZOLIN 1 G: 1 INJECTION, POWDER, FOR SOLUTION INTRAMUSCULAR; INTRAVENOUS at 10:19

## 2023-12-12 RX ADMIN — ACETAMINOPHEN 650 MG: 325 TABLET, FILM COATED ORAL at 02:47

## 2023-12-12 NOTE — ANESTHESIA POSTPROCEDURE EVALUATION
Patient: Yordy Hebert    Procedure Summary       Date: 12/11/23 Room / Location: [facility] CATH LAB 3 / [facility] CATH INVASIVE LOCATION    Anesthesia Start: 1715 Anesthesia Stop: 1913    Procedure: Pacemaker DC new, [employer]onic 3830 Diagnosis:       Complete heart block      (complete heart block)    Providers: George Montejo MD Provider: Randal Jeffrey MD    Anesthesia Type: general ASA Status: 4            Anesthesia Type: general    Vitals  Vitals Value Taken Time   /48 12/12/23 0402   Temp 97.5 °F (36.4 °C) 12/12/23 0000   Pulse 75 12/12/23 0403   Resp 14 12/12/23 0000   SpO2 92 % 12/12/23 0403   Vitals shown include unfiled device data.        Post Anesthesia Care and Evaluation    Patient location during evaluation: ICU  Patient participation: complete - patient participated  Level of consciousness: awake  Pain scale: See nurse's notes for pain score.  Pain management: adequate    Airway patency: patent  Anesthetic complications: No anesthetic complications  PONV Status: none  Cardiovascular status: acceptable  Respiratory status: acceptable and spontaneous ventilation  Hydration status: acceptable    Comments: Patient seen and examined postoperatively; vital signs stable; SpO2 greater than or equal to 90%; cardiopulmonary status stable; nausea/vomiting adequately controlled; pain adequately controlled; no apparent anesthesia complications; patient discharged from anesthesia care when discharge criteria were met

## 2023-12-12 NOTE — SIGNIFICANT NOTE
12/12/23 1109   OTHER   Discipline occupational therapist   Rehab Time/Intention   Session Not Performed unable to evaluate, medical status change  (On bedrest until 15:00)   Recommendation   OT - Next Appointment 12/13/23

## 2023-12-12 NOTE — NURSING NOTE
Met with patient & daughter at bedside.  TAVR education and postop instructions given with handouts. Temporary TAVR valve ID card given.  Follow up appointments scheduled.  First appointment is scheduled on  12/19/23 with Dr. Jalloh in [place].  Information on cardiac rehab given with pamphlet.  Patient is interested in attending at the [facility] location.

## 2023-12-12 NOTE — SIGNIFICANT NOTE
12/12/23 1025   OTHER   Discipline physical therapist   Rehab Time/Intention   Session Not Performed unable to evaluate, medical status change;other (see comments)  (on bedrest until 3PM)   Recommendation   PT - Next Appointment 12/13/23

## 2023-12-12 NOTE — PLAN OF CARE
Goal Outcome Evaluation:  Plan of Care Reviewed With: patient, other (see comments) (RN)           Outcome Evaluation: 84 yo male admitted 12/11/23 for TAVR procedure. Found to have complete heart block, so also underwent permanent pacemaker placement. Hx of aaa, cad. At baseline, pt able to ambulate household distances and short community distances w/o assistive device. Lives w/ his elderly wife in single level home w/ 4 stairs and handrail to enter. Pt reports he feels weak after a few days in bed. Today, pt is alert and oriented, but he is not realistic about his current status. He wanted to begin to amb when he did not have his balance. Agreed to use rw. Was very unsteady when first sitting and standing. However this did improve somewhat w/ time. He was able to amb 75 ft w/ rw and min assist. Will need home health at d/c. Hospital d/c pending.

## 2023-12-12 NOTE — CASE MANAGEMENT/SOCIAL WORK
Discharge Planning Assessment   [place]     Patient Name: Yordy Hebert  MRN: [id number]  Today's Date: 12/12/2023    Admit Date: 12/11/2023    Plan: DC Plan: Anticipate routine home with family. TAVR 12/11/2023   Discharge Needs Assessment       Row Name 12/12/23 0951       Living Environment    People in Home spouse    Name(s) of People in Home Lorena Anup    Current Living Arrangements home    Potentially Unsafe Housing Conditions none    Primary Care Provided by self    Provides Primary Care For no one    Family Caregiver if Needed spouse    Family Caregiver Names Lorena Hebert    Quality of Family Relationships helpful;involved;supportive    Able to Return to Prior Arrangements yes       Resource/Environmental Concerns    Resource/Environmental Concerns none    Transportation Concerns none       Food Insecurity    Within the past 12 months, you worried that your food would run out before you got the money to buy more. Never true    Within the past 12 months, the food you bought just didn't last and you didn't have money to get more. Never true       Transition Planning    Patient/Family Anticipates Transition to home with family    Patient/Family Anticipated Services at Transition none    Transportation Anticipated family or friend will provide       Discharge Needs Assessment    Readmission Within the Last 30 Days no previous admission in last 30 days    Equipment Currently Used at Home none    Concerns to be Addressed discharge planning    Anticipated Changes Related to Illness none    Equipment Needed After Discharge none    Provided Post Acute Provider List? N/A    Provided Post Acute Provider Quality & Resource List? N/A                   Discharge Plan       Row Name 12/12/23 0952       Plan    Plan DC Plan: Anticipate routine home with family. TAVR 12/11/2023    Provided Post Acute Provider List? Yes    Provided Post Acute Provider Quality & Resource List? N/A    Plan Comments CM spoke with patient at  bedside to discuss admission assessment and discharge planning. Patient confirms PCP and pharmacy. Patient confirms he is agreeable to enrolling in meds to bed program. CM enrolled patient and updated pharmacy in [place]. Patient denies any difficulty affording medications at this time. Patient denies any additional needs for services or DME at this time. Patient states his DIL will provide transportation at DC.CM reviewed chart documentation to obtain clinical updates. DC orders in place. No needs or services required from  at this time. No barriers to DC pending Echocardiogram review.               Expected Discharge Date and Time       Expected Discharge Date Expected Discharge Time    Dec 12, 2023            Demographic Summary       Row Name 12/12/23 0950       General Information    Admission Type inpatient    Arrived From operating room    Required Notices Provided Important Message from Medicare    Referral Source admission list    Reason for Consult discharge planning    Preferred Language English       Contact Information    Permission Granted to Share Info With                    Functional Status       Row Name 12/12/23 0950       Functional Status    Usual Activity Tolerance good    Current Activity Tolerance moderate       Physical Activity    On average, how many days per week do you engage in moderate to strenuous exercise (like a brisk walk)? 7 days    On average, how many minutes do you engage in exercise at this level? 30 min    Number of minutes of exercise per week 210       Functional Status, IADL    Medications independent    Meal Preparation independent    Housekeeping independent    Laundry independent    Shopping independent       Mental Status    General Appearance WDL WDL       Mental Status Summary    Recent Changes in Mental Status/Cognitive Functioning no changes       Employment/    Employment Status retired    Current or Previous Occupation factory work     Employment/ Comments [employer]           Current or Previous  Service none               Charmaine Pollard RN     Office Phone: [phone]  Office Cell:     [phone]

## 2023-12-12 NOTE — DISCHARGE PLACEMENT REQUEST
"Rajwinder Hebert (83 y.o. Male)       Date of Birth   1940    Social Security Number       Address   [address] [place] [address] [place] [facility]    Home Phone   [phone]    MRN   [id number]       Amish   None    Marital Status                               Admission Date   12/11/23    Admission Type   Elective    Admitting Provider   Todd Boogie MD    Attending Provider   Todd Boogie MD    Department, Room/Bed   [facility] CARDIOVASCULAR CARE UNIT, 2207/1       Discharge Date       Discharge Disposition   Home or Self Care    Discharge Destination                                 Attending Provider: Todd Boogie MD    Allergies: No Known Allergies    Isolation: None   Infection: None   Code Status: CPR    Ht: 172.7 cm (68\")   Wt: 73 kg (161 lb)    Admission Cmt: None   Principal Problem: Aortic stenosis, severe [I35.0]                   Active Insurance as of 12/11/2023       Primary Coverage       Payor Plan Insurance Group Employer/Plan Group    MEDICARE MEDICARE A & B        Payor Plan Address Payor Plan Phone Number Payor Plan Fax Number Effective Dates    [address] [phone]  12/1/2004 - None Entered    [facility] [id number]         Subscriber Name Subscriber Birth Date Member ID       RAJWINDER HEBERT 1940 [id number]               Secondary Coverage       Payor Plan Insurance Group Employer/Plan Group    HUMANA HUMANA  SUP              [id number]       Payor Plan Address Payor Plan Phone Number Payor Plan Fax Number Effective Dates    [address] [address]   1/1/2016 - None Entered    [facility] [address]         Subscriber Name Subscriber Birth Date Member ID       RAJWINDER HEBERT 1940 [id number]                     Emergency Contacts        (Rel.) Home Phone Work Phone Mobile Phone    Lorena Hebert (Spouse) [phone] -- [phone]    umu devlin (Daughter) -- -- [phone]                 History & Physical        Todd Boogie MD at 12/11/23 0730       "      Referring Provider: Todd Boogie MD          Patient Care Team:  Garima Jain MD as PCP - General (Family Medicine)  Raghav Doe MD as Consulting Physician (Cardiology)      SUBJECTIVE     Chief Complaint: Here for TAVR    History of present illness:  Yordy Hebert is a 83 y.o. male with history of CAD, hypertension, hyperlipidemia, PVD, AAA status postrepair status post severe aortic stenosis who presents for TAVR.   he has chronic back problems for which he gets pain shots occasionally and also takes hydrocodone.  He mentions he has had 2 stents placed in 2014 but he does not have a stent card with him today.  He has not had any repeated episodes of angina since that MI.   An echocardiogram has shown severe aortic stenosis with mean gradient of 50 mmHg.  EKG shows normal sinus rhythm, with occasional PVCs and left axis deviation.  Rate of 61 bpm.  No change compared to prior EKG.    He is in NYHA class III    Review of systems:    Constitutional: No weakness, fatigue, fever, rigors, chills   Eyes: No vision changes, eye pain   ENT/oropharynx: No difficulty swallowing, sore throat, epistaxis, changes in hearing   Cardiovascular: No chest pain, chest tightness, palpitations, paroxysmal nocturnal dyspnea, orthopnea, diaphoresis, dizziness / syncopal episode   Respiratory: + shortness of breath, dyspnea on exertion, cough, wheezing, hemoptysis   Gastrointestinal: No abdominal pain, nausea, vomiting, diarrhea, bloody stools   Genitourinary: No hematuria, dysuria   Neurological: No headache, tremors, numbness, one-sided weakness    Musculoskeletal: No cramps, myalgias, joint pain, joint swelling   Integument: No rash, edema        Personal History:      Past Medical History:   Diagnosis Date    Coronary artery disease     DJD (degenerative joint disease)     (L) hip    Family history of abdominal aortic aneurysm (AAA) repair 1/31/2012    Foot pain, left 4/13/2017    History of AAA (abdominal aortic  aneurysm) repair     Hyperlipidemia     Hypertension     Inguinal hernia     right    Prostatitis        Past Surgical History:   Procedure Laterality Date    ABDOMINAL AORTIC ANEURYSM REPAIR      BACK SURGERY  11/17/2016    CARDIAC CATHETERIZATION      CARDIAC CATHETERIZATION Right 11/16/2023    Procedure: Left Heart Cath with Coronary Angiography;  Surgeon: Todd Boogie MD;  Location: [facility] CATH INVASIVE LOCATION;  Service: Cardiovascular;  Laterality: Right;    CATARACT EXTRACTION, BILATERAL      CORONARY ANGIOPLASTY WITH STENT PLACEMENT      INGUINAL HERNIA REPAIR      PROSTATE SURGERY      RENAL ARTERY STENT Left 06/11/2008       Family History   Family history unknown: Yes       Social History     Tobacco Use    Smoking status: Every Day     Packs/day: 0.25     Years: 62.00     Additional pack years: 0.00     Total pack years: 15.50     Types: Cigarettes     Start date: 1960     Passive exposure: Current    Smokeless tobacco: Never   Vaping Use    Vaping Use: Never used   Substance Use Topics    Alcohol use: No    Drug use: Never        Home meds:  Prior to Admission medications    Medication Sig Start Date End Date Taking? Authorizing Provider   aspirin (aspirin) 81 MG EC tablet Take 1 tablet by mouth Daily. 2/6/12  Yes Provider, MD Yevgeniy   HYDROcodone-acetaminophen (NORCO)  MG per tablet Take 1 tablet by mouth Every 12 (Twelve) Hours As Needed. for pain 10/7/22  Yes Alphonse Haas MD   metoprolol succinate XL (TOPROL-XL) 25 MG 24 hr tablet Take 0.5 tablets by mouth Daily. 3/7/23  Yes Nadia Jalloh MD   pravastatin (PRAVACHOL) 80 MG tablet Take 1 tablet by mouth Every Night. 11/22/23  Yes Garima Jain MD       Allergies:     Patient has no known allergies.    Scheduled Meds:ceFAZolin, 2,000 mg, Intravenous, Once  sodium chloride, 10 mL, Intravenous, Q12H      Continuous Infusions:   PRN Meds:  [MAR Hold] Chlorhexidine Gluconate Cloth    sodium chloride    sodium  "chloride      OBJECTIVE    Vital Signs  Vitals:    12/11/23 0512   BP: 138/69   BP Location: Left arm   Patient Position: Lying   Pulse: 67   Resp: 16   Temp: 97.6 °F (36.4 °C)   TempSrc: Oral   SpO2: 97%   Weight: 67.4 kg (148 lb 9.4 oz)   Height: 172.7 cm (68\")       Flowsheet Rows      Flowsheet Row First Filed Value   Admission Height 172.7 cm (68\") Documented at 12/11/2023 0512   Admission Weight 67.4 kg (148 lb 9.4 oz) Documented at 12/11/2023 0512            No intake or output data in the 24 hours ending 12/11/23 0730         Physical Exam:  The patient is alert, oriented and in no distress.  Vital signs as noted above.  Head and neck revealed no carotid bruits or jugular venous distention.  No thyromegaly or lymphadenopathy is present  Lungs clear.  No wheezing.  Breath sounds are normal bilaterally.  Heart: Normal first and second heart sounds.  Systolic ejection murmur.  No precordial rub is present.  No gallop is present.  Abdomen: Soft and nontender.  No organomegaly is present.  Extremities with good peripheral pulses without any pedal edema.  Skin: Warm and dry.  Musculoskeletal system is grossly normal.  CNS grossly normal.       Results Review:  I have personally reviewed the results from the time of this admission to 12/11/2023 07:30 EST and agree with these findings:  []  Laboratory  []  Microbiology  []  Radiology  []  EKG/Telemetry   []  Cardiology/Vascular   []  Pathology  []  Old records  []  Other:    Most notable findings include:     Lab Results (last 24 hours)       Procedure Component Value Units Date/Time    POC Glucose Once [[id number]]  (Normal) Collected: 12/11/23 0601    Specimen: Blood Updated: 12/11/23 0602     Glucose 105 mg/dL      Comment: Serial Number: [id number]:  [id number]               Imaging Results (Last 24 Hours)       Procedure Component Value Units Date/Time    Hybrid Vascular OR Imaging (Autofinalize) [[id number]] Resulted: 12/11/23 0644     Updated: 12/11/23 " 0644    Hybrid Vascular OR Imaging (Autofinalize) [[id number]] Resulted: 12/11/23 0625     Updated: 12/11/23 0625            LAB RESULTS (LAST 7 DAYS)    CBC  Results from last 7 days   Lab Units 12/07/23  0829   WBC 10*3/mm3 3.70   RBC 10*6/mm3 4.33   HEMOGLOBIN g/dL 13.6   HEMATOCRIT % 41.0   MCV fL 94.6   PLATELETS 10*3/mm3 162       BMP  Results from last 7 days   Lab Units 12/07/23  0829   SODIUM mmol/L 139   POTASSIUM mmol/L 3.9   CHLORIDE mmol/L 99   CO2 mmol/L 31.0*   BUN mg/dL 19   CREATININE mg/dL 0.74*   GLUCOSE mg/dL 117*       CMP   Results from last 7 days   Lab Units 12/07/23  0829   SODIUM mmol/L 139   POTASSIUM mmol/L 3.9   CHLORIDE mmol/L 99   CO2 mmol/L 31.0*   BUN mg/dL 19   CREATININE mg/dL 0.74*   GLUCOSE mg/dL 117*   ALBUMIN g/dL 4.4   BILIRUBIN mg/dL 0.8   ALK PHOS U/L 92   AST (SGOT) U/L 20   ALT (SGPT) U/L 14       BNP        TROPONIN        CoAg  Results from last 7 days   Lab Units 12/07/23  0829   INR  1.00       Creatinine Clearance  Estimated Creatinine Clearance: 70.8 mL/min (A) (by C-G formula based on SCr of 0.74 mg/dL (L)).    ABG          Radiology  No radiology results for the last day      EKG  I personally viewed and interpreted the patient's EKG/Telemetry data:  No orders to display         Echocardiogram:          Stress Test:        Cardiac Catheterization:  Results for orders placed during the hospital encounter of 11/16/23    Cardiac Catheterization/Vascular Study    Narrative  OPERATORS  Todd Boogie M.D. (Attending Cardiologist)      PROCEDURE PERFORMED  Ultrasound guided vascular access  Right heart Catheterization  Left Heart Catheterization  Coronary Angiogram 60086  Moderate Sedation 40 minutes      INDICATIONS FOR PROCEDURE  81-year-old man who has been diagnosed with severe symptomatic aortic stenosis.  He came for pre-TAVR cardiac catheterization.  After discussing the risk and benefit of the procedure he was brought to the Cath Lab.    PROCEDURE IN  DETAIL  Informed consent was obtained from the patient after explaining the risks, benefits, and alternative options of the procedure. After obtaining informed consent, the patient was brought to the cath lab and was prepped in a sterile fashion. Lidocaine 1% was used for local anesthesia into the right IJ access site. Right IJ vein was accessed using the micropuncture needle under ultrasound guidance and micropuncture wire advanced under flouroscopy. A 7 [place] vascular sheath was put into place percutaneously over guide-wire. Guide wires were removed. A 7Fr swan elias catheter was advanced to wedge position. RA, RV and PA and wedge pressures were recorded. PA sat and arterial sats recorded and the swan was subsequently removed in its entirety. Next, The right radial artery was accessed with an angiocath needle under ultrasound guidance. A 6F slendersheath was inserted successfully.  Afterwards, 6F JR4 diagnostic catheter was advanced over a wire into the ascending aorta and was used to cross the AV and obtain LV pressures.  Gradient across the AV measured via pullback technique.  Next, 6F JL3.5 and JR4 catheters were used to engage the ostia of the left main and RCA respectively. Images of the right and left coronary systems were obtained. The catheters were exchanged over a wire and subsequently removed. The patient tolerated the procedure well without any complications. The pictures were reviewed at the end of the procedure. A TR band was applied.    CORONARY ANGIOGRAM FINDINGS:  Dominance: Right    #Left main: Left main is a large vessel Which gives rise to the LAD left circumflex artery.  Left main is angiographically free from any significant disease.    #Left Anterior Descending Artery: LAD is a large caliber vessel which gives rise to several septal perforators and several diagonal branches.  After origin LAD bifurcates into dual LAD.  Medial LAD is diminutive and gives rise to several septal branches.   Lateral LAD is a dominant vessel which appears to have 70 to 80% stenosis in the proximal segment.  Mid lateral LAD also has tandem 50% stenosis in the midsegment.  These lesions are unchanged when compared to cardiac catheterization from 2014 and 2016.    #Left Circumflex: The LCx is a large, non-dominant vessel which gives rise to OM branches.  Mid left circumflex has 40-50% stenosis.  This is unchanged when compared to cardiac catheterization from 2014 and 2016.    #Right Coronary Artery: The RCA is a large, dominant vessel which gives rise to several small caliber branches and the PDA and PLV.  There is a patent stent in proximal RCA.  Remaining RCA has diffuse luminal irregularities.      [facility] HEMODYNAMICS:  LVp 200/10, 14 mmHg  AOp 120/76, 95 mmHg  Peak to peak gradient of 80 mmHg across the aortic valve.  LV gram was not performed due to recent echocardiogram    [facility] HEMODYNAMICS:  RA 7/7, 7 mmHg  RV 29/7, 9 mmHg  PA 27/13, 21 mmHg  PCW 9/8, 7 mmHg  AO Sat 94%  PA Sat 60%    Rahul CO 3.72 liters per minute    Rahul CI 2.05 liters per minute per meter square    ESTIMATED BLOOD LOSS:  10 ml    COMPLICATIONS:  None    PROCEDURE DATA:  Contrast Used:40 cc  Fluoro Time: 3.12 mins  Sedation Time:    IMPRESSIONS  70 to 80% proximal LAD disease which is unchanged when compared to previous cardiac catheterization from 2014 in 2016.  Severe aortic stenosis  Normal wedge pressure and EDP    RECOMMENDATIONS  -- Proceed with TAVR  -- Medical management of stable coronary disease  -- Evaluation with cardiac surgery for possible CABG/AVR vs TAVR.    Electronically signed by Todd Boogie MD, 11/16/23, 2:46 PM EST.        Other:      ASSESSMENT & PLAN:    Principal Problem:    Aortic stenosis, severe    Severe aortic stenosis  Echocardiogram shows EF of 55 to 60%, grade 1 diastolic dysfunction and severe aortic stenosis with aortic valve area 0.7 cm² and mean/peak gradient of 50/75 mmHg.  Heart team evaluation has been completed  and he has been offered femoral TAVR.  Plan is to cautiously proceed with left transfemoral TAVR and if this is unsuccessful he will be offered a right carotid TAVR.     Coronary artery disease  Patient reports previous PCI with 2 stents in 2014  Continue with aspirin, statin, metoprolol  Currently chest pain-free     Hypertension  Well-controlled  Currently only on metoprolol  Changed metoprolol to XL     Hyperlipidemia  On pravastatin  LDL 79, HDL 54, triglyceride 92 and total cholesterol 150  Unable to tolerate high intensity statin due to myalgias.     Peripheral arterial disease  AAA status post repair  CT scan shows CT shows aortobiiliac bypass graft appears patent without significant  stenosis.  Left greater than right SFA and popliteal artery disease, likely hemodynamically significant on the left.  Moderate multifocal infrapopliteal disease with two-vessel runoff bilaterally  Bilateral ZARI normal  Focus on hypertension and heart rate control with beta-blocker  Continue statin     BPH   Continue tamsulosin    Todd Boogie MD  12/11/23  07:30 EST                Electronically signed by Todd Boogie MD at 12/11/23 3144

## 2023-12-12 NOTE — DISCHARGE SUMMARY
Date of Discharge:  12/12/2023    Discharge Diagnosis: Aortic stenosis status post TAVR, complete heart block status post permanent pacemaker placement    Presenting Problem(s)  Active Hospital Problems    Diagnosis  POA    **Aortic stenosis, severe [I35.0]  Yes    S/P TAVR (transcatheter aortic valve replacement) [Z95.2]  Not Applicable    Complete heart block [I44.2]  Yes      Resolved Hospital Problems   No resolved problems to display.      Yordy Hebert is a 83 y.o. male with history of CAD, hypertension, hyperlipidemia, PVD, AAA status postrepair status post severe aortic stenosis who presents for TAVR.   he has chronic back problems for which he gets pain shots occasionally and also takes hydrocodone.  He mentions he has had 2 stents placed in 2014 but he does not have a stent card with him today.  He has not had any repeated episodes of angina since that MI.   An echocardiogram has shown severe aortic stenosis with mean gradient of 50 mmHg.  After heart team workup he was offered transfemoral TAVR.    Hospital Course  Severe aortic stenosis  Echocardiogram shows EF of 55 to 60%, grade 1 diastolic dysfunction and severe aortic stenosis with aortic valve area 0.7 cm² and mean/peak gradient of 50/75 mmHg.  Left transfemoral TAVR on 12/11/2023 with 34 mm Medtronic valve.  Postprocedure echocardiogram shows well-functioning, well-seated valve with mean gradient of 3 mmHg.  Continue aspirin and Plavix    Complete heart block  Status post TAVR requiring permanent pacemaker placement (Medtronic) on 12/11/2023  Start beta-blocker  Norco as needed     Coronary artery disease  Patient reports previous PCI with 2 stents in 2014  Continue with aspirin, Plavix, statin, metoprolol  Currently chest pain-free     Hypertension  Well-controlled  Resume Toprol-XL today: Increasing dose to 25 mg daily     Hyperlipidemia  On pravastatin, changing to atorvastatin today  LDL 79, HDL 54, triglyceride 92 and total cholesterol  150  Unable to tolerate high intensity statin due to myalgias.     Peripheral arterial disease  AAA status post repair  CT scan shows CT shows aortobiiliac bypass graft appears patent without significant  stenosis.  Left greater than right SFA and popliteal artery disease, likely hemodynamically significant on the left.  Moderate multifocal infrapopliteal disease with two-vessel runoff bilaterally  Bilateral ZARI normal  Focus on hypertension and heart rate control with beta-blocker  Continue statin     BPH   Continue tamsulosin    Procedures Performed    Procedure(s):  Transfemoral Transcatheter Aortic Valve Replacement  TRANSFEMORAL TRANSCATHETER AORTIC VALVE REPLACEMENT  -------------------    Procedure(s):  Pacemaker DC new, Medtronic 3830  -------------------       Consults:   Consults       No orders found for last 30 day(s).            Pertinent Cardiac Test Results:     ECG:   ECG 12 Lead Rhythm Change    (Results Pending)   ECG 12 Lead Rhythm Change    (Results Pending)        Echocardiogram: Results for orders placed during the hospital encounter of 12/11/23    Adult Transthoracic Echo Limited with Contrast if Necessary Per Protocol POD #1    Interpretation Summary    Left ventricular ejection fraction appears to be 51 - 55%.    There is a TAVR valve present.    34 mm Medtronic valve is well-seated.  There is none to trace perivalvular leak.  Mean gradient is 3 mmHg.      Stress Test:        Vital Signs  Temp:  [97.4 °F (36.3 °C)-97.9 °F (36.6 °C)] 97.8 °F (36.6 °C)  Heart Rate:  [66-97] 82  Resp:  [14-19] 19  BP: ()/(46-75) 112/52    Physical Exam:  The patient is alert, oriented and in no distress.  Vital signs as noted above.  Head and neck revealed no carotid bruits or jugular venous distention.  No thyromegaly or lymph adenopathy is present  Lungs clear.  No wheezing.  Breath sounds are normal bilaterally.  Heart normal first and second heart sounds.  No precordial rub is present.  No gallop is  present.  Abdomen soft and nontender.  No organomegaly is present.  Extremities with good peripheral pulses without any pedal edema.  Skin warm and dry.  Musculoskeletal system is grossly normal  CNS grossly normal    Condition on Discharge: Stable      Discharge Disposition: Home  Home or Self Care    Discharge Medications     Discharge Medications        New Medications        Instructions Start Date   atorvastatin 10 MG tablet  Commonly known as: LIPITOR  Replaces: pravastatin 80 MG tablet   10 mg, Oral, Nightly      clopidogrel 75 MG tablet  Commonly known as: PLAVIX   75 mg, Oral, Daily      HYDROcodone-acetaminophen 5-325 MG per tablet  Commonly known as: NORCO  Replaces: HYDROcodone-acetaminophen  MG per tablet   1 tablet, Oral, Every 6 Hours PRN      naloxone 4 MG/0.1ML nasal spray  Commonly known as: NARCAN   Call 911. Don't prime. [place] in 1 nostril for overdose. Repeat in 2-3 minutes in other nostril if no or minimal breathing/responsiveness.             Changes to Medications        Instructions Start Date   metoprolol succinate XL 25 MG 24 hr tablet  Commonly known as: TOPROL-XL  What changed: how much to take   25 mg, Oral, Daily             Continue These Medications        Instructions Start Date   aspirin 81 MG EC tablet   81 mg, Oral, Daily             Stop These Medications      HYDROcodone-acetaminophen  MG per tablet  Commonly known as: NORCO  Replaced by: HYDROcodone-acetaminophen 5-325 MG per tablet     pravastatin 80 MG tablet  Commonly known as: PRAVACHOL  Replaced by: atorvastatin 10 MG tablet              Discharge Diet: Cardiac    Activity at Discharge:  As tolerated    Follow-up Appointments  Future Appointments   Date Time Provider Department Center   3/18/2024  1:00 PM Garima Jain MD [facility]   8/20/2024 10:00 AM [facility] VASC MACHINE 1  [facility]   8/20/2024 10:45 AM [facility] VASC MACHINE 2  [facility]   8/20/2024 11:30 AM [facility] VASC MACHINE 2  [facility]  [facility]   8/20/2024 12:15 PM ROOM 1, [facility] VAS [facility] V [facility] None         Test Results Pending at Discharge       Todd Boogie MD  12/12/23  08:45 EST    Time: Discharge 40 min    Post TAVR and pacemaker instructions provided to the patient and family at bedside.  We discussed importance of close follow-up for the valve and pacemaker.  Discussed the rationale for dual antiplatelet therapy and beta-blocker.  Cardiac rehab referral.  Medication reconciliation was completed and discharge follow-up appointments were made.

## 2023-12-12 NOTE — THERAPY EVALUATION
Patient Name: Yordy Hebert  : 1940    MRN: [id number]                              Today's Date: 2023       Admit Date: 2023    Visit Dx:     ICD-10-CM ICD-9-CM   1. S/P TAVR (transcatheter aortic valve replacement)  Z95.2 V43.3   2. Aortic stenosis, severe  I35.0 424.1   3. Complete heart block  I44.2 426.0   4. Presence of cardiac pacemaker  Z95.0 V45.01   5. Physical deconditioning  R53.81 799.3   6. Generalized weakness  R53.1 780.79   7. Limited range of motion (ROM) of shoulder  M25.614 719.51     Patient Active Problem List   Diagnosis    Abdominal aortic aneurysm    Aneurysm of iliac artery    Arteriosclerotic vascular disease    Chronic coronary artery disease    Benign prostatic hyperplasia with urinary obstruction    Essential hypertension    Degenerative joint disease    Carotid bruit    Hay fever    Mixed hyperlipidemia    Impaired fasting glucose    Inflammatory disease of prostate    Inguinal hernia, right    Lumbar radiculopathy    Peroneal tendinitis    Presence of cardiac and vascular implant and graft    Systolic murmur    S/P AAA repair    Left shoulder pain    Left rotator cuff tear arthropathy    Chronic midline low back pain with bilateral sciatica    Calculus of gallbladder with chronic cholecystitis without obstruction    Hematuria    Aortic stenosis, severe    S/P TAVR (transcatheter aortic valve replacement)    Complete heart block     Past Medical History:   Diagnosis Date    Coronary artery disease     DJD (degenerative joint disease)     (L) hip    Family history of abdominal aortic aneurysm (AAA) repair 2012    Foot pain, left 2017    History of AAA (abdominal aortic aneurysm) repair     Hyperlipidemia     Hypertension     Inguinal hernia     right    Prostatitis      Past Surgical History:   Procedure Laterality Date    ABDOMINAL AORTIC ANEURYSM REPAIR      BACK SURGERY  2016    CARDIAC CATHETERIZATION      CARDIAC CATHETERIZATION Right 2023     Procedure: Left Heart Cath with Coronary Angiography;  Surgeon: Todd Boogie MD;  Location: [facility] CATH INVASIVE LOCATION;  Service: Cardiovascular;  Laterality: Right;    CARDIAC ELECTROPHYSIOLOGY PROCEDURE N/A 12/11/2023    Procedure: Pacemaker DC new, Medtronic 3830;  Surgeon: George Montejo MD;  Location: [facility] CATH INVASIVE LOCATION;  Service: Cardiovascular;  Laterality: N/A;    CATARACT EXTRACTION, BILATERAL      CORONARY ANGIOPLASTY WITH STENT PLACEMENT      INGUINAL HERNIA REPAIR      PROSTATE SURGERY      RENAL ARTERY STENT Left 06/11/2008      General Information       Row Name 12/12/23 1600          Physical Therapy Time and Intention    Document Type evaluation  -CM     Mode of Treatment physical therapy  -CM       Row Name 12/12/23 1600          General Information    Patient Profile Reviewed yes  -CM     Prior Level of Function independent:;all household mobility;gait  short distances in community; drives; no home O2  -CM     Existing Precautions/Restrictions fall;cardiac;pacemaker  -CM     Barriers to Rehab medically complex  -CM       Row Name 12/12/23 1600          Living Environment    People in Home spouse  -CM       Row Name 12/12/23 1600          Home Main Entrance    Number of Stairs, Main Entrance four  -CM     Stair Railings, Main Entrance railings safe and in good condition  -CM       Row Name 12/12/23 1600          Stairs Within Home, Primary    Number of Stairs, Within Home, Primary none  -CM       Row Name 12/12/23 1600          Cognition    Orientation Status (Cognition) oriented x 4  -CM       Row Name 12/12/23 1600          Safety Issues, Functional Mobility    Safety Issues Affecting Function (Mobility) impulsivity;insight into deficits/self-awareness;awareness of need for assistance;judgment;positioning of assistive device;problem-solving  -CM     Impairments Affecting Function (Mobility) balance;endurance/activity tolerance;pain;postural/trunk control;strength  -CM                User Key  (r) = Recorded By, (t) = Taken By, (c) = Cosigned By      Initials Name Provider Type    Marilee Kenny, PT Physical Therapist                   Mobility       Row Name 12/12/23 1601          Bed Mobility    Bed Mobility supine-sit  -CM     Supine-Sit [place] (Bed Mobility) minimum assist (75% patient effort)  -CM     Comment, (Bed Mobility) normally sleeps in bed but plans to sleep in recliner during recovery  -CM       Row Name 12/12/23 1601          Sit-Stand Transfer    Sit-Stand [place] (Transfers) minimum assist (75% patient effort);moderate assist (50% patient effort)  -CM     Assistive Device (Sit-Stand Transfers) walker, front-wheeled  -CM     Comment, (Sit-Stand Transfer) reports he feels unsteady after 2 days in bed. Posterior lean for several minutes in standing but finally able to get balance and stand at rw  -CM       Row Name 12/12/23 1601          Gait/Stairs (Locomotion)    [place] Level (Gait) minimum assist (75% patient effort);1 person assist  -CM     Assistive Device (Gait) walker, front-wheeled  -CM     Patient was able to Ambulate yes  -CM     Distance in Feet (Gait) 75 ft w/ rw; pushes rw far ahead of him; needed multiple cues for appropriate proximity to rw; forward flexed posture.  -CM               User Key  (r) = Recorded By, (t) = Taken By, (c) = Cosigned By      Initials Name Provider Type    Marilee Kenny, PT Physical Therapist                   Obj/Interventions       Row Name 12/12/23 1604          Range of Motion Comprehensive    General Range of Motion upper extremity range of motion deficits identified  -CM     Comment, General Range of Motion pacemaker precautions, limiting LUE ROM  -CM       Row Name 12/12/23 1604          Strength Comprehensive (MMT)    General Manual Muscle Testing (MMT) Assessment no strength deficits identified  -CM     Comment, General Manual Muscle Testing (MMT) Assessment strength WFL  -CM       Row Name  12/12/23 1604          Balance    Balance Assessment sitting static balance;standing dynamic balance;sitting dynamic balance;standing static balance  -CM     Static Sitting Balance contact guard;minimal assist  -CM     Dynamic Sitting Balance minimal assist  -CM     Position, Sitting Balance unsupported;sitting edge of bed  -CM     Static Standing Balance minimal assist  -CM     Dynamic Standing Balance minimal assist  -CM     Position/Device Used, Standing Balance supported;walker, front-wheeled  -CM       Row Name 12/12/23 1604          Sensory Assessment (Somatosensory)    Sensory Assessment (Somatosensory) sensation intact  -CM               User Key  (r) = Recorded By, (t) = Taken By, (c) = Cosigned By      Initials Name Provider Type    CM Marilee Bejarano, PT Physical Therapist                   Goals/Plan    No documentation.                  Clinical Impression       Row Name 12/12/23 1605          Pain    Pretreatment Pain Rating 5/10  -CM     Posttreatment Pain Rating 5/10  -CM     Pain Location lower  -CM     Pain Location - back  -CM     Pre/Posttreatment Pain Comment chronic pain only  -CM     Pain Intervention(s) Ambulation/increased activity;Repositioned;Emotional support  -CM       Row Name 12/12/23 1605          Plan of Care Review    Plan of Care Reviewed With patient;other (see comments)  RN  -CM     Outcome Evaluation 82 yo male admitted 12/11/23 for TAVR procedure. Found to have complete heart block, so also underwent permanent pacemaker placement. Hx of aaa, cad. At baseline, pt able to ambulate household distances and short community distances w/o assistive device. Lives w/ his elderly wife in single level home w/ 4 stairs and handrail to enter. Pt reports he feels weak after a few days in bed. Today, pt is alert and oriented, but he is not realistic about his current status. He wanted to begin to amb when he did not have his balance. Agreed to use rw. Was very unsteady when first sitting  and standing. However this did improve somewhat w/ time. He was able to amb 75 ft w/ rw and min assist. Will need home health at d/c. Hospital d/c pending.  -CM       Row Name 12/12/23 1611          Therapy Assessment/Plan (PT)    Criteria for Skilled Interventions Met (PT) yes;meets criteria;skilled treatment is necessary  -CM     Therapy Frequency (PT) evaluation only  -CM     Predicted Duration of Therapy Intervention (PT) hospital d/c pending  -CM       Row Name 12/12/23 1611          Positioning and Restraints    Pre-Treatment Position in bed  -CM     Post Treatment Position chair  -CM     In Chair notified nsg;encouraged to call for assist;with nsg;sitting  -CM               User Key  (r) = Recorded By, (t) = Taken By, (c) = Cosigned By      Initials Name Provider Type    Marilee Kenny, PT Physical Therapist                   Outcome Measures       Row Name 12/12/23 1611          How much help from another person do you currently need...    Turning from your back to your side while in flat bed without using bedrails? 3  -CM     Moving from lying on back to sitting on the side of a flat bed without bedrails? 3  -CM     Moving to and from a bed to a chair (including a wheelchair)? 3  -CM     Standing up from a chair using your arms (e.g., wheelchair, bedside chair)? 3  -CM     Climbing 3-5 steps with a railing? 2  -CM     To walk in hospital room? 2  -CM     AM-PAC 6 Clicks Score (PT) 16  -CM     Highest Level of Mobility Goal 5 --> Static standing  -CM               User Key  (r) = Recorded By, (t) = Taken By, (c) = Cosigned By      Initials Name Provider Type    Marilee Kenny, PT Physical Therapist                                 Physical Therapy Education       Title: PT OT SLP Therapies (Done)       Topic: Physical Therapy (Done)       Point: Mobility training (Done)       Learning Progress Summary             Patient Acceptance, E,TB, VU by DILLON at 12/12/2023 1612                         Point:  Precautions (Done)       Learning Progress Summary             Patient Acceptance, E,TB, VU by CM at 12/12/2023 1612                                         User Key       Initials Effective Dates Name Provider Type Discipline    CM 06/16/21 -  Marilee Bejarano, PT Physical Therapist PT                  PT Recommendation and Plan     Plan of Care Reviewed With: patient, other (see comments) (RN)  Outcome Evaluation: 84 yo male admitted 12/11/23 for TAVR procedure. Found to have complete heart block, so also underwent permanent pacemaker placement. Hx of aaa, cad. At baseline, pt able to ambulate household distances and short community distances w/o assistive device. Lives w/ his elderly wife in single level home w/ 4 stairs and handrail to enter. Pt reports he feels weak after a few days in bed. Today, pt is alert and oriented, but he is not realistic about his current status. He wanted to begin to amb when he did not have his balance. Agreed to use rw. Was very unsteady when first sitting and standing. However this did improve somewhat w/ time. He was able to amb 75 ft w/ rw and min assist. Will need home health at d/c. Hospital d/c pending.     Time Calculation:   PT Evaluation Complexity  History, PT Evaluation Complexity: 3 or more personal factors and/or comorbidities  Examination of Body Systems (PT Eval Complexity): total of 4 or more elements  Clinical Presentation (PT Evaluation Complexity): stable  Clinical Decision Making (PT Evaluation Complexity): low complexity  Overall Complexity (PT Evaluation Complexity): low complexity     PT Charges       Row Name 12/12/23 1612 12/12/23 1025          Time Calculation    Start Time 1500  -CM --     Stop Time 1526  -CM --     Time Calculation (min) 26 min  -CM --     PT Received On 12/12/23  -CM --     PT - Next Appointment -- 12/13/23  -CM        Time Calculation- PT    Total Timed Code Minutes- PT 0 minute(s)  -CM --               User Key  (r) = Recorded By, (t)  = Taken By, (c) = Cosigned By      Initials Name Provider Type    CM Marilee Bejarano, PT Physical Therapist                  Therapy Charges for Today       Code Description Service Date Service Provider Modifiers Qty    33551629526 HC PT EVAL LOW COMPLEXITY 4 12/12/2023 Marilee Bejarano, PT GP 1            PT G-Codes  AM-PAC 6 Clicks Score (PT): 16  PT Discharge Summary  Anticipated Discharge Disposition (PT): home with 24/7 care, home with home health    Marilee Bejarano, PT  12/12/2023

## 2023-12-12 NOTE — PROGRESS NOTES
" LOS: 1 day   Patient Care Team:  Garima Jain MD as PCP - General (Family Medicine)  Raghav Doe MD as Consulting Physician (Cardiology)    Chief Complaint:   Post-op follow-up, s/p transcatheter aortic valve replacement (TAVR) utilizing a 34 mm Medtronic Evolut Pro valve, permanent pacemaker placement    Subjective:  Symptoms:  No shortness of breath or chest pain.    Diet:  Adequate intake.  No nausea or vomiting.    Activity level: Normal.    Pain:  He reports no pain.      Patient resting in bed.  He is without complaint this morning.  Eager to go home later.    Vital Signs  Temp:  [97.4 °F (36.3 °C)-97.9 °F (36.6 °C)] 97.8 °F (36.6 °C)  Heart Rate:  [66-97] 82  Resp:  [14-19] 19  BP: ()/(46-75) 112/52      12/11/23  0512 12/12/23  0500 12/12/23  0744   Weight: 67.4 kg (148 lb 9.4 oz) 73.1 kg (161 lb 2.5 oz) 73 kg (161 lb)     Body mass index is 24.48 kg/m².    Intake/Output Summary (Last 24 hours) at 12/12/2023 1031  Last data filed at 12/12/2023 0600  Gross per 24 hour   Intake 2387 ml   Output 1555 ml   Net 832 ml     No intake/output data recorded.    Objective:  General Appearance:  Comfortable and in no acute distress.    Vital signs: (most recent): Blood pressure 112/52, pulse 82, temperature 97.8 °F (36.6 °C), temperature source Oral, resp. rate 19, height 172.7 cm (68\"), weight 73 kg (161 lb), SpO2 92%.  Vital signs are normal.  No fever.    Output: Producing urine.    Lungs:  Normal effort and normal respiratory rate.  He is not in respiratory distress.    Heart: Normal rate.    Neurological: Patient is alert and oriented to person, place and time.    Skin:  Warm and dry.  (Bilateral groins CDI and soft; Left chest incision with dressing in place, CDI)          Results Review:      WBC WBC   Date Value Ref Range Status   12/12/2023 4.90 3.40 - 10.80 10*3/mm3 Final      HGB Hemoglobin   Date Value Ref Range Status   12/12/2023 12.1 (L) 13.0 - 17.7 g/dL Final   12/11/2023 11.2 (L) " "12.0 - 17.0 g/dL Final   12/11/2023 12.6 12.0 - 17.0 g/dL Final      HCT Hematocrit   Date Value Ref Range Status   12/12/2023 36.2 (L) 37.5 - 51.0 % Final   12/11/2023 33 (L) 38 - 51 % Final   12/11/2023 37 (L) 38 - 51 % Final      Platelets Platelets   Date Value Ref Range Status   12/12/2023 115 (L) 140 - 450 10*3/mm3 Final        PT/INR:  No results found for: \"PROTIME\"/No results found for: \"INR\"    Sodium Sodium   Date Value Ref Range Status   12/12/2023 138 136 - 145 mmol/L Final      Potassium Potassium   Date Value Ref Range Status   12/12/2023 4.0 3.5 - 5.2 mmol/L Final      Chloride Chloride   Date Value Ref Range Status   12/12/2023 103 98 - 107 mmol/L Final      Bicarbonate CO2   Date Value Ref Range Status   12/12/2023 25.0 22.0 - 29.0 mmol/L Final      BUN BUN   Date Value Ref Range Status   12/12/2023 12 8 - 23 mg/dL Final      Creatinine Creatinine   Date Value Ref Range Status   12/12/2023 0.64 (L) 0.76 - 1.27 mg/dL Final      Calcium Calcium   Date Value Ref Range Status   12/12/2023 8.9 8.6 - 10.5 mg/dL Final      Magnesium Magnesium   Date Value Ref Range Status   12/12/2023 2.0 1.6 - 2.4 mg/dL Final        aspirin, 81 mg, Oral, Daily  atorvastatin, 10 mg, Oral, Nightly  ceFAZolin, 1 g, Intravenous, Q8H  cephalexin, 500 mg, Oral, Q12H  clopidogrel, 75 mg, Oral, Daily  metoprolol succinate XL, 25 mg, Oral, Q24H      phenylephrine, 0.5-3 mcg/kg/min          Aortic stenosis, severe    S/P TAVR (transcatheter aortic valve replacement)    Complete heart block      Assessment & Plan  -Severe aortic stenosis -- s/p transcatheter aortic valve replacement (TAVR) utilizing a 34 mm Medtronic Evolut Pro valve 12/11 ([place])  - Complete heart block -- s/p permanent pacemaker (Medtronic)  - Hyperlipidemia -- on statin  - AAA -- history of open repair  - Renal artery stenosis -- history of left renal artery stent placement  - Tobacco abuse -- encouraged cessation    POD#1: Patient resting in bed.  He is without " complaint this morning.  Feeling good.  Bilateral groin sites are CDI and soft.  Chest x-ray shows no signs of pericardial effusion.  Echocardiogram shows well-seated TAVR valve, no effusion noted.  Plans for discharge today per cardiology.  Okay to discharge from our standpoint.    Lizette Olivera, APRN  12/12/23  10:31 EST  Addendum  Patient was seen and examined by me, agree with the findings above.  Pacemaker is in place.  Plan for discharge soon  Hemal Sloan MD

## 2023-12-12 NOTE — OP NOTE
TAVR OPERATIVE REPORT     CO-SURGEON: Hemal Sloan MD  CO-SURGEON: Todd Boogie MD  ASSISTANTS: Kandy Jalloh MD     DIAGNOSIS: Severe symptomatic aortic stenosis.      POSTOP DIAGNOSIS: Severe symptomatic aortic stenosis.      PRESENT CO-MORBIDITIES:    Hypertension, hyperlipidemia, coronary artery disease, status post PCI stents in 2014, status post AAA repair with an aorto by iliac bypass COPD severe, tobacco use, left renal stent, chronic back pain     PROCEDURE: Elective procedure in hybrid operating room      1. Transcatheter aortic valve replacement (TAVR) utilizing a 34 mm Medtronic Evolut Pro valve  2. Percutaneous right femoral arterial access   3. Percutaneous left femoral arterial and venous access  4. Abdominal aortography and bilateral lower extremity angiography  5. Transvenous pacing using a 5-Setswana balloon-tip pacer  6. Supravalvular aortogram  7. Transthoracic echocardiogram  8. Arterial line placement  9. Central venous catheter placement  10.  Predeployment valvuloplasty with a 22 mm true balloon        INDICATIONS FOR OPERATION: The patient is a 83-year-old with New York Heart Association Class 3 symptoms and STS score of 8%. The patient was determined to be best suited for TAVR by the multidisciplinary heart team due to elevated surgical risk.     FDA TAVR INDICATIONS: The patient was judged to be suitable for TAVR by the multidisciplinary team as reviewed by two cardiac surgeons, an interventional cardiologist, and the rest of the TAVR team.  The patient, family and team were in agreement that the case would convert to an open surgical AVR in the event of unsuccessful TAVR. The patient’s co-morbidities would not preclude the expected benefit from correction of the aortic stenosis by TAVR based on the team discussion. The patient will be enrolled in the STS/ACC TAVR TVT registry.      DESCRIPTION: Dr. Boogie (interventional cardiologist) and Dr. Sloan (cardiothoracic surgeon) performed  "the procedure together in all preoperative and operative aspects. The patient was taken to the hybrid OR. A radial art line and central venous access were inserted preoperatively. Anesthesia was administered without apparent complication. The patient was prepped and draped in the usual sterile fashion.       Using a micropuncture technique, access was obtained in the left femoral artery and a microsheath was inserted.  Angiography through the microsheath confirmed good position of the arterial access point.  Two Perclose sutures were deployed in offset manner in the left femoral artery using the \"Preclose\" technique.  An 8 [place]ian sheath was inserted.       A similar technique was used to obtain access in the right common femoral artery.    A 6 Bulgarian sheath was inserted.  Access was obtained in the left femoral vein and a 6 Bulgarian sheath was inserted.   A 5 Bulgarian pacing catheter was directed to the RV apex and was tested.  A 5 Bulgarian pigtail was directed to the non-coronary cusp.  Angiography was performed using 20 cc of contrast.      Heparin was used for anticoagulation.  From the right femoral approach, a 6 Bulgarian AL-1 catheter was directed to the ascending aorta.  We crossed the aortic valve with a straight wire and exchanged for a pigtail catheter.  We advanced the pigtail to the LV apex and exchanged for a Confida wire.  TTE demonstrated good position for the wire, without entanglement in the mitral valve apparatus.  A 22 mm true balloon was advanced into position through the valve and it was inflated to allow better accommodation of the valve.  Following a 34-mm Medtronic Evolut Pro valve was prepped and was inspected using fluoroscopy, then was loaded into the delivery system.  Sequential dilation of the right groin was performed, then the valve delivery system was easily inserted to the descending aorta.     The valve was advanced easily across the aortic arch and was placed near the non-coronary cusp.  " The valve was carefully deployed until annular contact was made.  Ventricular pacing was performed to stabilize the position.  Position was confirmed using angiography.  The valve was then deployed until the valve leaftets began to function.  On observation there was a fold in the valve therefore we posted the valve with a 24 mm true balloon with good opening of the valve to the expected level     Proper valve placement, orientation and degree of regurgitation was then evaluated by transthoracic echocardiogram and aortography. The co-operators agreed that no further intervention was necessary.     We then withdrew the delivery system to the descending aorta and withdrew the nosecone.  Over the wire, it was removed and hemostasis was obtained with the perclose sutures.  Angiography from the distal aorta.  All parties were in agreement that there was no flow-limiting vascular trauma or extravasation of dye, at which point closure of the arteriotomy was completed with our two Perclose devices.  Of note, there was an AV block of concern therefore will later on pacemaker so we can decide in the postoperative state whether he will need a permanent pacemaker.     Finally, our contralateral access was removed and closed using a 6 [place] Angioseal.  The femoral venous sheath was removed.  The patient left in the care of the anesthesia team for extubation and hemodynamic monitoring. The patient was transported to the Open Heart Recovery Unit for further monitoring and care.         1. Hemodynamics:  Post TAVR aortic gradient: 2 mmHg.  Post TAVR AI: None. Post ELELN: 2.1 cm2.      CONTRAST USED: 180 mL.      FLUROSCOPY TIME: 18.6 min     Air KERMA: 309 mGray    DAP: [id number] mGY     EBL: minimal     SPECIMENS: none     CONCLUSIONS:  Successful deployment of a 34 mm Medtronic Evolut Pro transcatheter aortic heart valve.

## 2023-12-12 NOTE — PROGRESS NOTES
"    Reason for follow-up: Complete heart block     Patient Care Team:  Garima Jain MD as PCP - General (Family Medicine)  Raghav Doe MD as Consulting Physician (Cardiology)    Subjective .   Yordy Hebert is doing well today.  ROS    Patient has no known allergies.    Scheduled Meds:aspirin, 81 mg, Oral, Daily  atorvastatin, 10 mg, Oral, Nightly  ceFAZolin, 1 g, Intravenous, Q8H  clopidogrel, 75 mg, Oral, Daily  metoprolol succinate XL, 25 mg, Oral, Q24H      Continuous Infusions:phenylephrine, 0.5-3 mcg/kg/min      PRN Meds:.  acetaminophen    bisacodyl    Calcium Replacement - Follow Nurse / BPA Driven Protocol    diphenhydrAMINE    docusate sodium    HYDROcodone-acetaminophen    Magnesium Cardiology Dose Replacement - Follow Nurse / BPA Driven Protocol    nitroglycerin    ondansetron    phenylephrine    Phosphorus Replacement - Follow Nurse / BPA Driven Protocol    Potassium Replacement - Follow Nurse / BPA Driven Protocol      VITAL SIGNS  Vitals:    12/12/23 0400 12/12/23 0500 12/12/23 0709 12/12/23 0744   BP: 109/48 121/54 112/52 112/52   BP Location: Left arm  Right arm    Patient Position: Lying  Lying    Pulse: 77 82     Resp: 17  19    Temp: 97.7 °F (36.5 °C)  97.8 °F (36.6 °C)    TempSrc: Oral  Oral    SpO2: 94% 92%     Weight:  73.1 kg (161 lb 2.5 oz)  73 kg (161 lb)   Height:    172.7 cm (68\")       Flowsheet Rows      Flowsheet Row First Filed Value   Admission Height 172.7 cm (68\") Documented at 12/11/2023 0512   Admission Weight 67.4 kg (148 lb 9.4 oz) Documented at 12/11/2023 0512               Physical Exam  VITALS REVIEWED    General:      well developed, in no acute distress.    Head:      normocephalic and atraumatic.    Eyes:      PERRL/EOM intact, conjunctiva and sclera clear with out nystagmus.    Neck:      no masses, thyromegaly,  trachea central with normal respiratory effort and PMI displaced laterally  Lungs:      Clear  Heart:       Regular rate and rhythm  Msk:      no " deformity or scoliosis noted of thoracic or lumbar spine.    Pulses:      pulses normal in all 4 extremities.    Extremities:       No lower extremity edema  Neurologic:      no focal deficits.   alert oriented x3  Skin:      intact without lesions or rashes.    Psych:      alert and cooperative; normal mood and affect; normal attention span and concentration.          LAB RESULTS (LAST 7 DAYS)    CBC  Results from last 7 days   Lab Units 12/12/23  0254 12/11/23  0847 12/11/23  0742 12/07/23  0829   WBC 10*3/mm3 4.90  --   --  3.70   RBC 10*6/mm3 3.86*  --   --  4.33   HEMOGLOBIN g/dL 12.1*  --   --  13.6   HEMOGLOBIN, POC g/dL  --  11.2* 12.6  --    HEMATOCRIT % 36.2*  --   --  41.0   HEMATOCRIT POC %  --  33* 37*  --    MCV fL 93.8  --   --  94.6   PLATELETS 10*3/mm3 115*  --   --  162       BMP  Results from last 7 days   Lab Units 12/12/23  0254 12/07/23  0829   SODIUM mmol/L 138 139   POTASSIUM mmol/L 4.0 3.9   CHLORIDE mmol/L 103 99   CO2 mmol/L 25.0 31.0*   BUN mg/dL 12 19   CREATININE mg/dL 0.64* 0.74*   GLUCOSE mg/dL 102* 117*   MAGNESIUM mg/dL 2.0  --        CMP   Results from last 7 days   Lab Units 12/12/23  0254 12/07/23  0829   SODIUM mmol/L 138 139   POTASSIUM mmol/L 4.0 3.9   CHLORIDE mmol/L 103 99   CO2 mmol/L 25.0 31.0*   BUN mg/dL 12 19   CREATININE mg/dL 0.64* 0.74*   GLUCOSE mg/dL 102* 117*   ALBUMIN g/dL  --  4.4   BILIRUBIN mg/dL  --  0.8   ALK PHOS U/L  --  92   AST (SGOT) U/L  --  20   ALT (SGPT) U/L  --  14         BNP        TROPONIN        CoAg  Results from last 7 days   Lab Units 12/07/23  0829   INR  1.00       Creatinine Clearance  Estimated Creatinine Clearance: 88.7 mL/min (A) (by C-G formula based on SCr of 0.64 mg/dL (L)).    ABG  Results from last 7 days   Lab Units 12/11/23  0847 12/11/23  0742   PH, ARTERIAL pH units 7.300* 7.330*   PCO2, ARTERIAL mm Hg 48.9* 54.4*   PO2 ART mm Hg 353.0* 33.0*   O2 SATURATION ART % 100.0* 58.0*   BASE EXCESS ART mmol/L <0.0* 3.0          EKG    I personally reviewed the patient's EKG/Telemetry data: Sinus rhythm with ventricular pacing      Assessment & Plan       Aortic stenosis, severe    Complete heart block    S/P TAVR (transcatheter aortic valve replacement)      Yordy Nguyen an 83-year-old male patient who has severe aortic stenosis, he received TAVR on 12/11/2023.  He did go into complete heart block and temporary pacemaker was placed to the right groin.  EP was consulted for consideration of pacemaker implantation.    Patient underwent dual-chamber left bundle area pacemaker implantation on 12/11/2023.  He was seen and examined this morning, no bleeding or hematoma at surgical incision site.  Chest x-ray showed good lead positioning, device interrogation showed appropriate function both A and V leads had less than 1 V threshold.  His QRS is also narrow with the conduction system pacing.    He is okay to be discharged from EP standpoint.  He will need 5 days of Keflex 500 mg twice daily.  Appointment for wound check and device check in 2 weeks.  Instructions regarding left arm restrictions were given to the patient.      I discussed the patients findings and my recommendations with patient and agrees with outlined plan.    George Montejo MD  12/12/23  09:54 EST

## 2023-12-13 ENCOUNTER — TRANSITIONAL CARE MANAGEMENT TELEPHONE ENCOUNTER (OUTPATIENT)
Dept: CALL CENTER | Facility: HOSPITAL | Age: 83
End: 2023-12-13
Payer: MEDICARE

## 2023-12-13 ENCOUNTER — TELEPHONE (OUTPATIENT)
Dept: CARDIOLOGY | Facility: CLINIC | Age: 83
End: 2023-12-13
Payer: MEDICARE

## 2023-12-13 ENCOUNTER — DOCUMENTATION (OUTPATIENT)
Dept: HOME HEALTH SERVICES | Facility: HOME HEALTHCARE | Age: 83
End: 2023-12-13
Payer: MEDICARE

## 2023-12-13 ENCOUNTER — HOME HEALTH ADMISSION (OUTPATIENT)
Dept: HOME HEALTH SERVICES | Facility: HOME HEALTHCARE | Age: 83
End: 2023-12-13
Payer: MEDICARE

## 2023-12-13 NOTE — TELEPHONE ENCOUNTER
Called [facility], there was a problem with their system, pharmacist reran the prescription and it went through. Info to patient and his daughter.

## 2023-12-13 NOTE — TELEPHONE ENCOUNTER
Lipitor not covered, prior auth is needed ,   Does he continue previous medication-did not know name- until pa for lipitor is covered

## 2023-12-13 NOTE — OUTREACH NOTE
Prep Survey      Flowsheet Row Responses   [facility] patient discharged from? [place]   Is LACE score < 7 ? Yes   Eligibility [facility]   Date of Admission 12/11/23   Date of Discharge 12/12/23   Discharge Disposition Home or Self Care   Discharge diagnosis TAVR (transcatheter aortic valve replacement)   Does the patient have one of the following disease processes/diagnoses(primary or secondary)? Cardiothoracic surgery   Does the patient have Home health ordered? Yes   What is the Home health agency?  [facility]   Is there a DME ordered? No   Prep survey completed? Yes            Garima HAMILTON - Registered Nurse

## 2023-12-13 NOTE — PROGRESS NOTES
Demographics verified. Pt is agreeable to services and has no other agency    PT    Dr. Garima Jain will be the following provider    Surgeon: Dr. Hemal Sloan  Pharmacy: [facility] patient

## 2023-12-13 NOTE — CASE MANAGEMENT/SOCIAL WORK
Case Management Discharge Note         Home Medical Care       Service Provider Selected Services Address Phone Fax Patient Preferred    [facility] [address] [facility] [place] [address] [phone] --                 Transportation Services  Private: Car (with daughter in law)    Final Discharge Disposition Code: 06 - home with home health care

## 2023-12-13 NOTE — OUTREACH NOTE
Call Center TCM Note      Flowsheet Row Responses   [facility] [place] patient discharged from? [facility]   Does the patient have one of the following disease processes/diagnoses(primary or secondary)? Cardiothoracic surgery   TCM attempt successful? Yes   Call start time 1036   Call end time 1042   Discharge diagnosis TAVR (transcatheter aortic valve replacement)   Meds reviewed with patient/caregiver? Yes   Is the patient having any side effects they believe may be caused by any medication additions or changes? No   Does the patient have all medications related to this admission filled (includes all antibiotics, pain medications, cardiac medications, etc.) Yes   Is the patient taking all medications as directed (includes completed medication regime)? Yes   Comments Has appt with Dr. Russell on 12/19 @ 3:00, cardiology appt on 12/26   Does the patient have an appointment with their PCP within 7-14 days of discharge? No   What is the Home health agency?  [facility]   Has home health visited the patient within 72 hours of discharge? Call prior to 72 hours   Psychosocial issues? No   Did the patient receive a copy of their discharge instructions? Yes   Nursing interventions Reviewed instructions with patient   What is the patient's perception of their health status since discharge? Improving   Is the patient /caregiver able to teach back basic post-op care? Practice cough and deep breath every 4 hours while awake, Continue use of incentive spirometry at least 1 week post discharge, Hold pillow to support chest when coughing, Shower daily, No tub bath, swimming, or hot tub until instructed by MD, Use a clean wash cloth and antibacterial bar or liquid soap to clean incisions, Lifting as instructed by MD in discharge instructions   Is the patient/caregiver able to teach back signs and symptoms of incisional infection? Fever, Pus or odor from incision, Incisional warmth, Increased drainage or bleeding, Increased redness,  swelling or pain at the incisonal site   Is the patient/caregiver able to teach back steps to recovery at home? Set small, achievable goals for return to baseline health, Rest and rebuild strength, gradually increase activity, Eat a well-balance diet   Is the patient /caregiver able to teach back the importance of cardiac rehab? Yes   If the patient is a current smoker, are they able to teach back resources for cessation? Not a smoker   Is the patient/caregiver able to teach back the hierarchy of who to call/visit for symptoms/problems? PCP, Specialist, Home health nurse, Urgent Care, ED, 911 Yes   Additional teach back comments Pt had questions regarding when he could take off bandages.  No instructions on AVS and advised to contact surgeon's office.   TCM call completed? Yes   Wrap up additional comments Will contact PCP office for an appt.  Pt to call surgeon's office regarding dressing.   Call end time 1042            Marie Perry LPN    12/13/2023, 10:43 EST

## 2023-12-15 ENCOUNTER — HOME CARE VISIT (OUTPATIENT)
Dept: HOME HEALTH SERVICES | Facility: HOME HEALTHCARE | Age: 83
End: 2023-12-15
Payer: MEDICARE

## 2023-12-15 NOTE — PROGRESS NOTES
"Enter Query Response Below      Query Response: Diastolic heart failure/HFpEF secondary to coronary artery disease, hypertension, valvular heart disease.    Electronically signed by Todd Boogie MD, 12/15/23, 4:28 PM EST.               If applicable, please update the problem list.     Patient: Yordy Hebert        : 1940  Account: [id number]           Admit Date: 2023        How to Respond to this query:       a. Click New Note     b. Answer query within the yellow box.                c. Update the Problem List, if applicable.      If you have any questions about this query contact me at: [email or web address]     ,     84 yo male admitted with symptomatic nonrheumatic aortic stenosis.  Patient underwent TAVR and Permanent pacemaker placement for complete heart block.  Patient with a history of CAD, HTN, PVD, HLP.    () Cardiac cath and OP Note include \"Congestive heart failure, NYHA class III.\"  () Echo EF 51-55%  Treatment includes monitoring and PO Toprol-XL.    Please clarify the type of heart failure treated/monitored:    Diastolic heart failure/HFpEF  Other- specify_________  Unable to determine    By submitting this query, we are merely seeking further clarification of documentation to accurately reflect all conditions that you are monitoring, evaluating, treating or that extend the hospitalization or utilize additional resources of care. Please utilize your independent clinical judgment when addressing the question(s) above.     This query and your response, once completed, will be entered into the legal medical record.    Sincerely,  Natacha Brenner RN,[facility]  Clinical Documentation Integrity Program   "

## 2023-12-16 NOTE — CASE COMMUNICATION
Patient not admitted to home health services by PT as scheduled this day (12/15/2023). Patient declines admission asserting that he does not need this service.

## 2023-12-19 ENCOUNTER — OFFICE VISIT (OUTPATIENT)
Dept: FAMILY MEDICINE CLINIC | Facility: CLINIC | Age: 83
End: 2023-12-19
Payer: MEDICARE

## 2023-12-19 VITALS
TEMPERATURE: 96.1 F | WEIGHT: 153 LBS | DIASTOLIC BLOOD PRESSURE: 60 MMHG | HEART RATE: 77 BPM | BODY MASS INDEX: 23.19 KG/M2 | OXYGEN SATURATION: 98 % | HEIGHT: 68 IN | SYSTOLIC BLOOD PRESSURE: 147 MMHG

## 2023-12-19 DIAGNOSIS — G89.29 CHRONIC MIDLINE LOW BACK PAIN WITH BILATERAL SCIATICA: ICD-10-CM

## 2023-12-19 DIAGNOSIS — M54.16 LUMBAR RADICULOPATHY: ICD-10-CM

## 2023-12-19 DIAGNOSIS — M54.42 CHRONIC MIDLINE LOW BACK PAIN WITH BILATERAL SCIATICA: ICD-10-CM

## 2023-12-19 DIAGNOSIS — Z95.0 PRESENCE OF CARDIAC PACEMAKER: ICD-10-CM

## 2023-12-19 DIAGNOSIS — Z09 HOSPITAL DISCHARGE FOLLOW-UP: Primary | ICD-10-CM

## 2023-12-19 DIAGNOSIS — M54.41 CHRONIC MIDLINE LOW BACK PAIN WITH BILATERAL SCIATICA: ICD-10-CM

## 2023-12-19 DIAGNOSIS — Z95.2 S/P TAVR (TRANSCATHETER AORTIC VALVE REPLACEMENT): ICD-10-CM

## 2023-12-19 DIAGNOSIS — Z95.0 PACEMAKER: ICD-10-CM

## 2023-12-19 RX ORDER — HYDROCODONE BITARTRATE AND ACETAMINOPHEN 5; 325 MG/1; MG/1
2 TABLET ORAL DAILY PRN
Qty: 30 TABLET | Refills: 0 | Status: SHIPPED | OUTPATIENT
Start: 2023-12-19 | End: 2024-01-03

## 2023-12-19 NOTE — PROGRESS NOTES
Transitional Care Follow Up Visit  Subjective     Yordy Hebert is a 83 y.o. male who presents for a transitional care management visit.    Within 48 business hours after discharge our office contacted him via telephone to coordinate his care and needs.      I reviewed and discussed the details of that call along with the discharge summary, hospital problems, inpatient lab results, inpatient diagnostic studies, and consultation reports with Yordy.     Current outpatient and discharge medications have been reconciled for the patient.  Reviewed by: AMBER Lozoya          12/12/2023     8:47 PM   Date of TCM Phone Call   [facility]   Date of Admission 12/11/2023   Date of Discharge 12/12/2023   Discharge Disposition Home or Self Care     Risk for Readmission (LACE) Score: 7 (12/12/2023  6:00 AM)      History of Present Illness   Course During Hospital Stay:     83-year-old male patient of Dr. Garima Jain presents for TCM today.  12/11/2023 patient was admitted for severe aortic stenosis, SP TAVR, complete heart block.  Pacemaker [employer] was placed.  Discharged on atorvastatin 10 mg daily, Plavix 75 mg daily, hydrocodone 5 mg 4 times daily as needed, and naloxone.  Patient was advised to change metoprolol to 25 mg daily and continue aspirin 81 mg daily.  Was referred to cardiac rehab.  Appointment with cardiology 12/21/2023 this week. He is without CP but does reports he is slightly SOB and some mild fatigue.  He has a significant amount of bruising on the anterior chest but no tenderness to palpation.  He was supposed to take incentive spirometer home but forgot it at the hospital. He is asking for order for this so he can use at home.     His back is bothering him, he has chronic pain in lumbar spine with bilateral sciatica and radiculopathy. He follows pain management and receives injections.  He was advised to skip his upcoming injections status post his pacemaker surgery.  The  "injections do help and he only takes Norco 10 mg once daily.  Dr Painting has noted \"no need for him to go to another pain clinic for 1 hydrocodone per day.  He appears to be low risk for diversion\".  Patient reports the pharmacy has been out of the 10 mg Norco's.  I will place him on two 5 mg tablets daily for the next 2 and half weeks.  If he needs a refill he can contact the office so Dr. Painting can take care of that.         The following portions of the patient's history were reviewed and updated as appropriate: allergies, current medications, past family history, past medical history, past social history, past surgical history, and problem list.    Review of Systems   Constitutional:  Positive for fatigue.   HENT: Negative.     Eyes: Negative.    Respiratory: Negative.     Cardiovascular: Negative.    Gastrointestinal: Negative.    Endocrine: Negative.    Genitourinary: Negative.    Skin:         Chest bruised   Allergic/Immunologic: Negative.    Neurological: Negative.    Hematological:  Bruises/bleeds easily.        ACT       Objective   Physical Exam  Vitals reviewed.   Constitutional:       Appearance: He is well-developed.      Comments:      HENT:      Head: Normocephalic and atraumatic.      Nose: Nose normal.      Mouth/Throat:      Mouth: Mucous membranes are moist.      Pharynx: Oropharynx is clear.   Eyes:      Conjunctiva/sclera: Conjunctivae normal.      Pupils: Pupils are equal, round, and reactive to light.   Cardiovascular:      Rate and Rhythm: Normal rate and regular rhythm.      Pulses: Normal pulses.      Heart sounds: Normal heart sounds.   Pulmonary:      Effort: Pulmonary effort is normal. No respiratory distress.      Breath sounds: Normal breath sounds. No stridor. No wheezing, rhonchi or rales.   Chest:      Chest wall: No mass, lacerations, deformity, swelling, tenderness, crepitus or edema.          Comments: Bruising anterior chest,   left upper chest p.m. with Steri-Strips intact.  " CDI no erythema or drainage.  Musculoskeletal:         General: Normal range of motion.      Cervical back: Normal range of motion.   Skin:     General: Skin is warm and dry.      Findings: Bruising present. No rash.             Comments: Bruising anterior chest   Neurological:      Mental Status: He is alert and oriented to person, place, and time.   Psychiatric:         Behavior: Behavior normal.         Assessment & Plan   Problems Addressed this Visit       Lumbar radiculopathy    Chronic midline low back pain with bilateral sciatica    Relevant Medications    HYDROcodone-acetaminophen (NORCO) 5-325 MG per tablet    S/P TAVR (transcatheter aortic valve replacement)    Relevant Medications    HYDROcodone-acetaminophen (NORCO) 5-325 MG per tablet    Other Relevant Orders    Spirometer Incentive Airlife 1Way Valve 4000ML LF    Pacemaker    Relevant Orders    Spirometer Incentive Airlife 1Way Valve 4000ML LF    Hospital discharge follow-up - Primary     Other Visit Diagnoses       Presence of cardiac pacemaker        Relevant Medications    HYDROcodone-acetaminophen (NORCO) 5-325 MG per tablet          Diagnoses         Codes Comments    Hospital discharge follow-up    -  Primary ICD-10-CM: Z09  ICD-9-CM: V67.59     S/P TAVR (transcatheter aortic valve replacement)     ICD-10-CM: Z95.2  ICD-9-CM: V43.3     Pacemaker     ICD-10-CM: Z95.0  ICD-9-CM: V45.01     Presence of cardiac pacemaker     ICD-10-CM: Z95.0  ICD-9-CM: V45.01     Chronic midline low back pain with bilateral sciatica     ICD-10-CM: M54.41, M54.42, G89.29  ICD-9-CM: 724.2, 724.3, 338.29     Lumbar radiculopathy     ICD-10-CM: M54.16  ICD-9-CM: 724.4           Pt stable and doing well s/p PM implant and aortic valve replacement.  Follows with Dr. Jalloh on 12/21/2023.  Refilled [employer] for patient's chronic back pain.  Patient to follow-up with Dr. Garima Jain 3/18/2024.  If he has any problems or issues prior to then advised him to reach out.  Sent  order since patient forgot it at the hospital r incentive spirometer

## 2023-12-21 ENCOUNTER — OFFICE VISIT (OUTPATIENT)
Dept: CARDIOLOGY | Facility: CLINIC | Age: 83
End: 2023-12-21
Payer: MEDICARE

## 2023-12-21 VITALS
WEIGHT: 153 LBS | SYSTOLIC BLOOD PRESSURE: 139 MMHG | OXYGEN SATURATION: 99 % | HEART RATE: 95 BPM | DIASTOLIC BLOOD PRESSURE: 34 MMHG | BODY MASS INDEX: 23.19 KG/M2 | HEIGHT: 68 IN

## 2023-12-21 DIAGNOSIS — Z86.79 S/P AAA REPAIR: ICD-10-CM

## 2023-12-21 DIAGNOSIS — E78.2 MIXED HYPERLIPIDEMIA: ICD-10-CM

## 2023-12-21 DIAGNOSIS — Z95.2 S/P TAVR (TRANSCATHETER AORTIC VALVE REPLACEMENT): Primary | ICD-10-CM

## 2023-12-21 DIAGNOSIS — I10 ESSENTIAL HYPERTENSION: ICD-10-CM

## 2023-12-21 DIAGNOSIS — I44.2 COMPLETE HEART BLOCK: ICD-10-CM

## 2023-12-21 DIAGNOSIS — I25.10 CHRONIC CORONARY ARTERY DISEASE: ICD-10-CM

## 2023-12-21 DIAGNOSIS — Z95.0 PACEMAKER: ICD-10-CM

## 2023-12-21 DIAGNOSIS — Z98.890 S/P AAA REPAIR: ICD-10-CM

## 2023-12-21 LAB
ACT BLD: 136 SECONDS (ref 89–137)
ACT BLD: 152 SECONDS (ref 89–137)
ACT BLD: 239 SECONDS (ref 89–137)
ACT BLD: 249 SECONDS (ref 89–137)
ACT BLD: 287 SECONDS (ref 89–137)
ACT BLD: 309 SECONDS (ref 89–137)

## 2023-12-21 RX ORDER — HYDROCODONE BITARTRATE AND ACETAMINOPHEN 10; 325 MG/1; MG/1
1 TABLET ORAL EVERY 8 HOURS PRN
COMMUNITY
Start: 2023-12-19 | End: 2023-12-21

## 2023-12-21 NOTE — PROGRESS NOTES
Encounter Date:11/01/2023        Patient ID: Yordy Hebert is a 83 y.o. male.      Chief Complaint:  TAVR follow-up    History of Present Illness  82-year-old with history of CAD, hypertension, hyperlipidemia, PVD, AAA status postrepair, severe aortic stenosis status post transcatheter transfemoral aortic valve replacement with a 34 mm Medtronic valve who presents for follow-up 1 week after his TAVR.  TAVR was complicated by complete heart block for which he underwent pacemaker placement.  He is doing well since his procedure.  Denies any chest pain or shortness of breath.  Does have a little bit of fatigue.  No access site issues.  He has significant bruising on his chest and bruises easily.  Denies any pain at his pacemaker site.  No dizziness or lightheadedness.       The following portions of the patient's history were reviewed and updated as appropriate: allergies, current medications, past family history, past medical history, past social history, past surgical history, and problem list.    Review of Systems   Constitutional: Negative for malaise/fatigue.   Cardiovascular:  Negative for chest pain, dyspnea on exertion, leg swelling and palpitations.   Respiratory:  Negative for cough and shortness of breath.    Gastrointestinal:  Negative for abdominal pain, nausea and vomiting.   Neurological:  Negative for dizziness, focal weakness, headaches, light-headedness and numbness.   All other systems reviewed and are negative.        Current Outpatient Medications:     aspirin (aspirin) 81 MG EC tablet, Take 1 tablet by mouth Daily., Disp: , Rfl:     atorvastatin (LIPITOR) 10 MG tablet, Take 1 tablet by mouth Every Night., Disp: 90 tablet, Rfl: 3    clopidogrel (PLAVIX) 75 MG tablet, Take 1 tablet by mouth Daily., Disp: 90 tablet, Rfl: 3    HYDROcodone-acetaminophen (NORCO) 5-325 MG per tablet, Take 2 tablets by mouth Daily As Needed for Moderate Pain or Severe Pain for up to 15 days., Disp: 30 tablet, Rfl: 0     metoprolol succinate XL (TOPROL-XL) 25 MG 24 hr tablet, Take 1 tablet by mouth Daily., Disp: 90 tablet, Rfl: 3    Current outpatient and discharge medications have been reconciled for the patient.  Reviewed by: Nadia Jalloh MD       No Known Allergies    Family History   Family history unknown: Yes       Past Surgical History:   Procedure Laterality Date    ABDOMINAL AORTIC ANEURYSM REPAIR      AORTIC VALVE REPAIR/REPLACEMENT N/A 12/11/2023    Procedure: Transfemoral Transcatheter Aortic Valve Replacement;  Surgeon: Todd oBogie MD;  Location: [facility] HYBRID OR;  Service: Cardiovascular;  Laterality: N/A;  TAVR using 34mm Medtronic aortic valve.    AORTIC VALVE REPAIR/REPLACEMENT N/A 12/11/2023    Procedure: TRANSFEMORAL TRANSCATHETER AORTIC VALVE REPLACEMENT;  Surgeon: Hemal Sloan MD;  Location: [facility] HYBRID OR;  Service: Cardiothoracic;  Laterality: N/A;    BACK SURGERY  11/17/2016    CARDIAC CATHETERIZATION      CARDIAC CATHETERIZATION Right 11/16/2023    Procedure: Left Heart Cath with Coronary Angiography;  Surgeon: Todd Boogie MD;  Location: [facility] CATH INVASIVE LOCATION;  Service: Cardiovascular;  Laterality: Right;    CARDIAC ELECTROPHYSIOLOGY PROCEDURE N/A 12/11/2023    Procedure: Pacemaker DC new, Medtronic 3830;  Surgeon: George Montejo MD;  Location: [facility] CATH INVASIVE LOCATION;  Service: Cardiovascular;  Laterality: N/A;    CATARACT EXTRACTION, BILATERAL      CORONARY ANGIOPLASTY WITH STENT PLACEMENT      INGUINAL HERNIA REPAIR      PROSTATE SURGERY      RENAL ARTERY STENT Left 06/11/2008       Past Medical History:   Diagnosis Date    Coronary artery disease     DJD (degenerative joint disease)     (L) hip    Family history of abdominal aortic aneurysm (AAA) repair 1/31/2012    Foot pain, left 4/13/2017    History of AAA (abdominal aortic aneurysm) repair     Hyperlipidemia     Hypertension     Inguinal hernia     right    Prostatitis        Family History   Family history unknown:  "Yes       Social History     Socioeconomic History    Marital status:    Tobacco Use    Smoking status: Former     Packs/day: 0.25     Years: 62.00     Additional pack years: 0.00     Total pack years: 15.50     Types: Cigarettes     Start date:      Quit date: 2023     Years since quittin.0     Passive exposure: Current    Smokeless tobacco: Never   Vaping Use    Vaping Use: Never used   Substance and Sexual Activity    Alcohol use: No    Drug use: Never    Sexual activity: Not Currently               Objective:       Physical Exam    BP (!) 139/34 (BP Location: Left arm, Patient Position: Sitting)   Pulse 95   Ht 172.7 cm (68\")   Wt 69.4 kg (153 lb)   SpO2 99%   BMI 23.26 kg/m²   The patient is alert, oriented and in no distress.    Vital signs as noted above.    Head and neck revealed no carotid bruits or jugular venous distension.  No thyromegaly or lymphadenopathy is present.    Lungs clear.  No wheezing.  Breath sounds are normal bilaterally.    Heart normal first and second heart sounds.  No murmur..  No pericardial rub is present.  No gallop is present.  Ecchymosis on chest is healing.  Pacemaker pocket is soft and nontender with dressing in place.    Abdomen soft and nontender.  No organomegaly is present.    Extremities revealed good peripheral pulses without any pedal edema.    Skin warm and dry.    Musculoskeletal system is grossly normal.    CNS grossly normal.           Diagnosis Plan   1. S/P TAVR (transcatheter aortic valve replacement)        2. Complete heart block        3. Pacemaker        4. S/P AAA repair        5. Mixed hyperlipidemia        6. Essential hypertension        7. Chronic coronary artery disease          LAB RESULTS (LAST 7 DAYS)    CBC        BMP        CMP         BNP        TROPONIN        CoAg        Creatinine Clearance  Estimated Creatinine Clearance: 84.3 mL/min (A) (by C-G formula based on SCr of 0.64 mg/dL (L)).    ABG        Radiology  No " radiology results for the last day    EKG    ECG 12 Lead    Date/Time: 12/21/2023 3:53 PM  Performed by: Nadia Jalloh MD    Authorized by: Nadia Jalloh MD  Comparison: compared with previous ECG   Comparison to previous ECG: Previously paced rhythm  Rhythm: sinus rhythm  Ectopy: unifocal PVCs and atrial premature contractions  Rate: normal  Conduction: conduction normal  QRS axis: normal  Pacing: atrial sensed rhythm and ventricular paced rhythm  Clinical impression: normal ECG          Stress test      Echocardiogram  Results for orders placed during the hospital encounter of 12/11/23    Adult Transthoracic Echo Limited with Contrast if Necessary Per Protocol POD #1    Interpretation Summary    Left ventricular ejection fraction appears to be 51 - 55%.    There is a TAVR valve present.    34 mm Medtronic valve is well-seated.  There is none to trace perivalvular leak.  Mean gradient is 3 mmHg.      Cardiac catheterization  No results found for this or any previous visit.          Assessment and Plan       Diagnoses and all orders for this visit:    1. S/P TAVR (transcatheter aortic valve replacement) (Primary)    2. Complete heart block    3. Pacemaker    4. S/P AAA repair    5. Mixed hyperlipidemia    6. Essential hypertension    7. Chronic coronary artery disease  Overview:  Had MI on 2014 - [facility] emergent cath - had 2 stents           Severe aortic stenosis  Status post transcatheter transfemoral aortic valve replacement with 34 mm Medtronic valve  Cardiac rehab referral in [place]  Continue aspirin and Plavix  Plan for echo in 1 month    Complete heart block  Permanent pacemaker in place  Pacemaker check planned for next week    Coronary artery disease  Patient reports previous PCI with 2 stents in 2014  Cardiac catheterization prior to TAVR showed stable CAD in the LAD  Continue with aspirin, statin, metoprolol  Currently chest pain-free     Hypertension  Well-controlled  Continue with  metoprolol    Hyperlipidemia  On pravastatin  LDL 79, HDL 54, triglyceride 92 and total cholesterol 150  Unable to tolerate high intensity statin due to myalgias.     Peripheral arterial disease  AAA status post repair  CT scan shows CT shows aortobiiliac bypass graft appears patent without significant  stenosis.  Left greater than right SFA and popliteal artery disease, likely  hemodynamically significant on the left.  Moderate multifocal infrapopliteal disease with two-vessel runoff bilaterally  Bilateral ZARI normal  Focus on hypertension and heart rate control with beta-blocker  Continue statin     BPH   Continue tamsulosin

## 2023-12-26 ENCOUNTER — CLINICAL SUPPORT NO REQUIREMENTS (OUTPATIENT)
Dept: CARDIOLOGY | Facility: CLINIC | Age: 83
End: 2023-12-26
Payer: MEDICARE

## 2023-12-26 DIAGNOSIS — Z95.0 PACEMAKER: Primary | ICD-10-CM

## 2023-12-26 DIAGNOSIS — I44.2 HEART BLOCK AV COMPLETE: ICD-10-CM

## 2023-12-26 NOTE — PROGRESS NOTES
Patient is here today s/p pacemaker placement. Interrogation of device is normal, stable leads. T wave over sensing causing device to label A Fib episodes, that is not true. Discussed with Dr Montejo, called [employer] and had [employer] services look at events and recommend programming. Tech services suggested programming Partial to Partial +. Completed this, will monitor remote and see if this is effective. Removed steri strips, incision is healed, well approximated with no redness or drainage noted. Patient is bruised at site and on chest, and has some swelling at pocket. Instructed to monitor incision site for s/s of infection, call if redness, swelling worse, drainage or heat to touch. Instructed patient on left arm precautions as well. Patient has follow up scheduled for [place].

## 2023-12-28 ENCOUNTER — TELEPHONE (OUTPATIENT)
Dept: CARDIAC REHAB | Facility: HOSPITAL | Age: 83
End: 2023-12-28
Payer: MEDICARE

## 2024-01-21 NOTE — PROGRESS NOTES
Encounter Date:01/24/2024        Patient ID: Yordy Hebert is a 83 y.o. male.      Chief Complaint:      History of Present Illness  83-year-old with history of CAD, hypertension, hyperlipidemia, PVD, AAA status postrepair, severe aortic stenosis status post transcatheter transfemoral aortic valve replacement with a 34 mm Medtronic valve who presents for follow-up 1 week after his TAVR.  TAVR was complicated by complete heart block for which he underwent pacemaker placement.  He is doing well since his procedure.  Denies any chest pain or shortness of breath.  Does have a little bit of fatigue.  No access site issues.  He has significant bruising on his chest and bruises easily.  Denies any pain at his pacemaker site.  No dizziness or lightheadedness.   ECG in the office today shows paced rhythm with PVCs and bigeminy pattern.  Heart rate is 77 bpm, QRS is 124 ms and QTc is 473 ms.  Today he feels well and denies any chest pain or shortness of breath.  He is in NYHA class I-II.      The following portions of the patient's history were reviewed and updated as appropriate: allergies, current medications, past family history, past medical history, past social history, past surgical history, and problem list.    Review of Systems   Constitutional: Negative for malaise/fatigue.   Cardiovascular:  Negative for chest pain, dyspnea on exertion, leg swelling and palpitations.   Respiratory:  Positive for shortness of breath. Negative for cough.    Gastrointestinal:  Negative for abdominal pain, nausea and vomiting.   Neurological:  Negative for dizziness, focal weakness, headaches, light-headedness and numbness.   All other systems reviewed and are negative.      Current Outpatient Medications:     aspirin (aspirin) 81 MG EC tablet, Take 1 tablet by mouth Daily., Disp: , Rfl:     atorvastatin (LIPITOR) 10 MG tablet, Take 1 tablet by mouth Every Night., Disp: 90 tablet, Rfl: 3    clopidogrel (PLAVIX) 75 MG tablet, Take 1  tablet by mouth Daily., Disp: 90 tablet, Rfl: 3    metoprolol succinate XL (TOPROL-XL) 25 MG 24 hr tablet, Take 1 tablet by mouth Daily., Disp: 90 tablet, Rfl: 3    Current outpatient and discharge medications have been reconciled for the patient.  Reviewed by: Todd Boogie MD       No Known Allergies    Family History   Family history unknown: Yes       Past Surgical History:   Procedure Laterality Date    ABDOMINAL AORTIC ANEURYSM REPAIR      AORTIC VALVE REPAIR/REPLACEMENT N/A 12/11/2023    Procedure: Transfemoral Transcatheter Aortic Valve Replacement;  Surgeon: Todd Boogie MD;  Location: [facility] HYBRID OR;  Service: Cardiovascular;  Laterality: N/A;  TAVR using 34mm Medtronic aortic valve.    AORTIC VALVE REPAIR/REPLACEMENT N/A 12/11/2023    Procedure: TRANSFEMORAL TRANSCATHETER AORTIC VALVE REPLACEMENT;  Surgeon: Hemal Sloan MD;  Location: [facility] HYBRID OR;  Service: Cardiothoracic;  Laterality: N/A;    BACK SURGERY  11/17/2016    CARDIAC CATHETERIZATION      CARDIAC CATHETERIZATION Right 11/16/2023    Procedure: Left Heart Cath with Coronary Angiography;  Surgeon: Todd Boogie MD;  Location: [facility] CATH INVASIVE LOCATION;  Service: Cardiovascular;  Laterality: Right;    CARDIAC ELECTROPHYSIOLOGY PROCEDURE N/A 12/11/2023    Procedure: Pacemaker DC new, Medtronic 3830;  Surgeon: George Montejo MD;  Location: [facility] CATH INVASIVE LOCATION;  Service: Cardiovascular;  Laterality: N/A;    CATARACT EXTRACTION, BILATERAL      CORONARY ANGIOPLASTY WITH STENT PLACEMENT      INGUINAL HERNIA REPAIR      PROSTATE SURGERY      RENAL ARTERY STENT Left 06/11/2008       Past Medical History:   Diagnosis Date    Coronary artery disease     DJD (degenerative joint disease)     (L) hip    Family history of abdominal aortic aneurysm (AAA) repair 1/31/2012    Foot pain, left 4/13/2017    History of AAA (abdominal aortic aneurysm) repair     Hyperlipidemia     Hypertension     Inguinal hernia     right    Prostatitis         Family History   Family history unknown: Yes       Social History     Socioeconomic History    Marital status:    Tobacco Use    Smoking status: Former     Packs/day: 0.25     Years: 62.00     Additional pack years: 0.00     Total pack years: 15.50     Types: Cigarettes     Start date:      Quit date: 2023     Years since quittin.1     Passive exposure: Current    Smokeless tobacco: Never   Vaping Use    Vaping Use: Never used   Substance and Sexual Activity    Alcohol use: No    Drug use: Never    Sexual activity: Not Currently               Objective:       Physical Exam    There were no vitals taken for this visit.  The patient is alert, oriented and in no distress.    Vital signs as noted above.    Head and neck revealed no carotid bruits or jugular venous distension.  No thyromegaly or lymphadenopathy is present.    Lungs clear.  No wheezing.  Breath sounds are normal bilaterally.    Heart normal first and second heart sounds.  No murmur..  No pericardial rub is present.  No gallop is present.    Abdomen soft and nontender.  No organomegaly is present.    Extremities revealed good peripheral pulses without any pedal edema.    Skin warm and dry.    Musculoskeletal system is grossly normal.    CNS grossly normal.          No diagnosis found.LAB RESULTS (LAST 7 DAYS)    CBC        BMP        CMP         BNP        TROPONIN        CoAg        Creatinine Clearance  CrCl cannot be calculated (Patient's most recent lab result is older than the maximum 30 days allowed.).    ABG        Radiology  No radiology results for the last day    EKG  Procedures    Stress test      Echocardiogram  Results for orders placed during the hospital encounter of 23    Adult Transthoracic Echo Limited with Contrast if Necessary Per Protocol POD #1    Interpretation Summary    Left ventricular ejection fraction appears to be 51 - 55%.    There is a TAVR valve present.    34 mm Medtronic valve is  well-seated.  There is none to trace perivalvular leak.  Mean gradient is 3 mmHg.      Cardiac catheterization  Results for orders placed during the hospital encounter of 12/11/23    Cardiac Catheterization/Vascular Study    Narrative  Indication:  Severe symptomatic nonrheumatic aortic stenosis  Congestive heart failure, NYHA class III    Procedures performed  Ultrasound-guided vascular access  Common femoral angiography  Balloon aortic valvuloplasty  Supravalvular aortogram  Temporary pacemaker placement  Transcatheter aortic valve replacement with 34 mm Evolut pro plus via left femoral arterial access  Aorto abdominal aortogram with bilateral iliofemoral runoff  Perclose and Mynx deployment    Procedural Details  The procedure was performed under conscious sedation in the hybrid OR.    Under ultrasound guidance, a 6 F introducer was placed in the right femoral vein using modified Seldinger technique. Under ultrasound guidance, a 7 F introducer was placed in the left femoral artery using modified Seldinger technique. Under ultrasound guidance, a 7 F introducer was placed in the right femoral artery using modified Seldinger technique.    Two Perclose ProGlide devices were used to 'pre-close' the LFA access site. A 5F bipolar electrode temporary pacemaking wire was inserted via the right femoral venous sheath and positioned using fluoroscopy. Pacing threshold was tested. The temporary pacemaker wire was secured in place.    A 6F pigtail catheter was placed in the aortic root via the RFA sheath. The aortic valve was crossed retrograde with a 0.035 inch straight wire through a 6F AL1 catheter via the LFA 18Fr sheath. The AL1 catheter was advanced into the LV and a exchange length 0.035 inch lunderquist wire in the LV.  We then performed a balloon aortic valvuloplasty under rapid pacing at 180 bpm with a 20 x 45 mm true balloon.    The 18F sheath was removed and the Kenzei delivery sheath was inserted over the  allyerquist wire.    The TAVR procedure was then performed using a 34 mm Evolut pro + device. It was advanced over the lunderquist wire to the aortic valve position. After confirming correct position of the valve by fluoroscopy, the valve was implanted with ventricular pacing and dynamic aortography.  After deployment we noted an insole in the valve frame causing significant constraint in the valve.  We then advanced a 24 x 45 mm true balloon and performed postdilatation balloon aortic valvuloplasty under rapid pacing at 180 bpm.  The patient at this time developed complete heart block and we performed backup pacing at 80 bpm.    After valve deployment the valve was well-seated and there was no significant paravalvular leak noted.  This was confirmed by fluoroscopic views as well as echocardiogram.  Final gradient across the valve was 2 mmHg.    At the end of the procedure, the Medtronic delivery system was removed over a guidewire. The Perclose devices were deployed to secure the arterial access on the TAVR side which was left femoral access.    Distal aortic, iliac and femoral angiography was performed with no evidence of dissection, aneurysm or vascular injury.    The 7F RFA sheath was removed and the arteriotomy was closed with a Mynx closure device. The 6F RFV sheath along with the temporary pacemaker wire were left in place in anticipation for permanent pacemaker placement later today.  The patient was transferred to [facility]  in stable condition.    Lee Boogie and Nathalia performed the TAVR procedure. Dr. Jalloh assisted.    Estimated blood loss  20 mL    Complications  Complete heart block requiring permanent pacemaker placement.    Recommendations  Permanent pacemaker placement  Dual antiplatelet therapy  Cardiac rehab  Repeat echocardiogram and ECG in morning.    Electronically signed by Todd Boogie MD, 12/11/23, 9:15 AM EST.          Assessment and Plan       There are no diagnoses linked to this encounter.      Severe aortic stenosis  Status post transcatheter transfemoral aortic valve replacement with 34 mm Medtronic valve  Cardiac rehab referral in [place]  Continue aspirin and Plavix  Echocardiogram shows well-seated and functioning valve there is mild aortic regurgitation.  EF has reduced 40% from normal most likely secondary to multiple PVCs.  Uptitrating beta-blocker today  Unable to tolerate ACE inhibitor due to low blood pressure.  Repeat echocardiogram in 6 to 8 months     Complete heart block/PVCs  Permanent pacemaker in place  ECG shows frequent PVCs and bigeminy pattern  I will increase the dose of beta-blocker today     Coronary artery disease  Patient reports previous PCI with 2 stents in 2014  Cardiac catheterization prior to TAVR showed stable CAD in the LAD  Continue with aspirin, statin, metoprolol  Currently chest pain-free     Hypertension  Well-controlled  Continue with metoprolol     Hyperlipidemia  On pravastatin  LDL 79, HDL 54, triglyceride 92 and total cholesterol 150  Unable to tolerate high intensity statin due to myalgias.     Peripheral arterial disease  AAA status post repair  CT scan shows CT shows aortobiiliac bypass graft appears patent without significant  stenosis.  Left greater than right SFA and popliteal artery disease, likely  hemodynamically significant on the left.  Moderate multifocal infrapopliteal disease with two-vessel runoff bilaterally  Bilateral ZARI normal  Focus on hypertension and heart rate control with beta-blocker  Continue statin     BPH   Continue tamsulosin

## 2024-01-24 ENCOUNTER — OFFICE VISIT (OUTPATIENT)
Dept: CARDIOLOGY | Facility: CLINIC | Age: 84
End: 2024-01-24
Payer: MEDICARE

## 2024-01-24 ENCOUNTER — OUTSIDE FACILITY SERVICE (OUTPATIENT)
Dept: CARDIOLOGY | Facility: CLINIC | Age: 84
End: 2024-01-24
Payer: MEDICARE

## 2024-01-24 VITALS
DIASTOLIC BLOOD PRESSURE: 76 MMHG | BODY MASS INDEX: 22.88 KG/M2 | HEART RATE: 77 BPM | WEIGHT: 151 LBS | HEIGHT: 68 IN | OXYGEN SATURATION: 95 % | SYSTOLIC BLOOD PRESSURE: 122 MMHG

## 2024-01-24 DIAGNOSIS — I25.10 CHRONIC CORONARY ARTERY DISEASE: ICD-10-CM

## 2024-01-24 DIAGNOSIS — I50.22 CHRONIC HFREF (HEART FAILURE WITH REDUCED EJECTION FRACTION): ICD-10-CM

## 2024-01-24 DIAGNOSIS — I71.40 ABDOMINAL AORTIC ANEURYSM (AAA) WITHOUT RUPTURE, UNSPECIFIED PART: ICD-10-CM

## 2024-01-24 DIAGNOSIS — I73.9 PERIPHERAL VASCULAR DISEASE, UNSPECIFIED: ICD-10-CM

## 2024-01-24 DIAGNOSIS — E78.2 MIXED HYPERLIPIDEMIA: ICD-10-CM

## 2024-01-24 DIAGNOSIS — Z95.2 S/P TAVR (TRANSCATHETER AORTIC VALVE REPLACEMENT): Primary | ICD-10-CM

## 2024-01-24 DIAGNOSIS — Z98.890 S/P AAA REPAIR: ICD-10-CM

## 2024-01-24 DIAGNOSIS — Z86.79 S/P AAA REPAIR: ICD-10-CM

## 2024-01-24 DIAGNOSIS — Z87.438 HISTORY OF BPH: ICD-10-CM

## 2024-01-24 DIAGNOSIS — Z95.0 PACEMAKER: ICD-10-CM

## 2024-01-24 DIAGNOSIS — I44.2 HEART BLOCK AV COMPLETE: ICD-10-CM

## 2024-01-24 DIAGNOSIS — I35.0 AORTIC STENOSIS, SEVERE: ICD-10-CM

## 2024-01-24 DIAGNOSIS — I10 ESSENTIAL HYPERTENSION: ICD-10-CM

## 2024-01-24 PROCEDURE — 93306 TTE W/DOPPLER COMPLETE: CPT | Performed by: INTERNAL MEDICINE

## 2024-01-24 PROCEDURE — 93356 MYOCRD STRAIN IMG SPCKL TRCK: CPT | Performed by: INTERNAL MEDICINE

## 2024-01-24 RX ORDER — METOPROLOL SUCCINATE 50 MG/1
50 TABLET, EXTENDED RELEASE ORAL DAILY
Qty: 90 TABLET | Refills: 3 | Status: SHIPPED | OUTPATIENT
Start: 2024-01-24

## 2024-01-26 ENCOUNTER — TELEPHONE (OUTPATIENT)
Dept: CARDIOLOGY | Facility: CLINIC | Age: 84
End: 2024-01-26
Payer: MEDICARE

## 2024-01-26 NOTE — TELEPHONE ENCOUNTER
Pt would like a call back at [phone] to discuss his heart rate. Pt states he went to [facility] today and his blood pressure was 137/37 and heart rate was between 33-44. He was told by the [facility] office that his hear rate was too low and to contact Dr Boogie. Pt said that Dr Boogie changed his metoprolol from 25mg to 50mg this past week and he is wanting to know what he needs to do.

## 2024-01-29 NOTE — TELEPHONE ENCOUNTER
Spoke to patient, advised him to sent remote check. Probably PVC's causing rates to be 33 to 44. PVC's are not felt and pulse rates appear to be low. Pacemake will prevent bradycardia.

## 2024-03-06 ENCOUNTER — TRANSCRIBE ORDERS (OUTPATIENT)
Dept: ADMINISTRATIVE | Facility: HOSPITAL | Age: 84
End: 2024-03-06
Payer: MEDICARE

## 2024-03-06 DIAGNOSIS — M54.50 PAIN OF LUMBAR SPINE: Primary | ICD-10-CM

## 2024-03-18 ENCOUNTER — OFFICE VISIT (OUTPATIENT)
Dept: FAMILY MEDICINE CLINIC | Facility: CLINIC | Age: 84
End: 2024-03-18
Payer: MEDICARE

## 2024-03-18 VITALS
RESPIRATION RATE: 18 BRPM | OXYGEN SATURATION: 96 % | TEMPERATURE: 97.5 F | BODY MASS INDEX: 22.7 KG/M2 | HEIGHT: 68 IN | DIASTOLIC BLOOD PRESSURE: 78 MMHG | SYSTOLIC BLOOD PRESSURE: 153 MMHG | WEIGHT: 149.8 LBS | HEART RATE: 59 BPM

## 2024-03-18 DIAGNOSIS — G89.29 CHRONIC MIDLINE LOW BACK PAIN WITH BILATERAL SCIATICA: ICD-10-CM

## 2024-03-18 DIAGNOSIS — E78.2 MIXED HYPERLIPIDEMIA: ICD-10-CM

## 2024-03-18 DIAGNOSIS — I44.2 HEART BLOCK AV COMPLETE: ICD-10-CM

## 2024-03-18 DIAGNOSIS — Z95.2 S/P TAVR (TRANSCATHETER AORTIC VALVE REPLACEMENT): ICD-10-CM

## 2024-03-18 DIAGNOSIS — N40.1 BENIGN PROSTATIC HYPERPLASIA WITH URINARY OBSTRUCTION: ICD-10-CM

## 2024-03-18 DIAGNOSIS — Z95.0 PACEMAKER: ICD-10-CM

## 2024-03-18 DIAGNOSIS — M54.41 CHRONIC MIDLINE LOW BACK PAIN WITH BILATERAL SCIATICA: ICD-10-CM

## 2024-03-18 DIAGNOSIS — I25.10 CHRONIC CORONARY ARTERY DISEASE: ICD-10-CM

## 2024-03-18 DIAGNOSIS — M54.42 CHRONIC MIDLINE LOW BACK PAIN WITH BILATERAL SCIATICA: ICD-10-CM

## 2024-03-18 DIAGNOSIS — I10 ESSENTIAL HYPERTENSION: ICD-10-CM

## 2024-03-18 DIAGNOSIS — N13.8 BENIGN PROSTATIC HYPERPLASIA WITH URINARY OBSTRUCTION: ICD-10-CM

## 2024-03-18 DIAGNOSIS — I35.0 AORTIC STENOSIS, SEVERE: Primary | ICD-10-CM

## 2024-03-18 PROCEDURE — 1160F RVW MEDS BY RX/DR IN RCRD: CPT | Performed by: FAMILY MEDICINE

## 2024-03-18 PROCEDURE — 1159F MED LIST DOCD IN RCRD: CPT | Performed by: FAMILY MEDICINE

## 2024-03-18 PROCEDURE — 99213 OFFICE O/P EST LOW 20 MIN: CPT | Performed by: FAMILY MEDICINE

## 2024-03-18 PROCEDURE — 3078F DIAST BP <80 MM HG: CPT | Performed by: FAMILY MEDICINE

## 2024-03-18 PROCEDURE — 3077F SYST BP >= 140 MM HG: CPT | Performed by: FAMILY MEDICINE

## 2024-03-18 RX ORDER — ACETAMINOPHEN 500 MG
1000 TABLET ORAL EVERY 12 HOURS
COMMUNITY

## 2024-03-18 RX ORDER — HYDROCODONE BITARTRATE AND ACETAMINOPHEN 10; 325 MG/1; MG/1
1 TABLET ORAL EVERY 8 HOURS PRN
COMMUNITY
Start: 2024-01-26

## 2024-03-18 NOTE — PROGRESS NOTES
Subjective   Yordy Hebert is a 83 y.o. male.   Chief Complaint   Patient presents with    Hypertension    Hyperlipidemia    Benign Prostatic Hypertrophy    Coronary Artery Disease       History of Present Illness   83-year-old white male presents to the office today to follow-up on chronic medical problems per active problem list as below.  I last saw him 6 months ago.  He had blood work done back in October.  Lipid panel was excellent.  PSA was good at 0.1.  CBC was normal except for platelets of 141,000, CMP was normal.  GFR was 87.  Glucose was 104.    HTN-blood pressure is a little high at 153/78.      He was seeing Dr. Boogie.  He did a heart echocardiogram.  Ultimately went on to have a transcatheter aortic valve replacement on December 11.  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  Doing OK regarding that.     Labs 1/2024 - CBC, CMP all ok.      CAD - asymptomatic now.      BPH-had TURP last summer.  Still follows with urology.  PSA 0.1 as above.     Chronic back pain - sees Dr. Haas. On Norco.  Had shots - Not helping.  Back surgery  8 or 9 years ago by Dr. Domínguez.  Seeing him again. .   Dr. Domínguez has ordered a CAT scan that is going to be done later this week.  He sees his pain management doctor at the end of the week.  This is his only real complaint.          Patient Active Problem List    Diagnosis Date Noted    Pacemaker 12/19/2023    Hospital discharge follow-up 12/19/2023    S/P TAVR (transcatheter aortic valve replacement) 12/11/2023    Heart block AV complete 12/11/2023    Aortic stenosis, severe 11/01/2023    Hematuria 08/03/2023    Calculus of gallbladder with chronic cholecystitis without obstruction 12/11/2022     Note Last Updated: 12/11/2022     Noted on U/S - 12/9/22.  Asymptomatic       Chronic midline low back pain with bilateral sciatica 09/21/2022    Left shoulder pain 01/08/2020    Left rotator cuff tear arthropathy 01/08/2020    Abdominal aortic aneurysm  11/01/2017     Note Last Updated: 1/30/2020     Had AAA repaired in '08 with a sleeve - repeat CT in 9/19 - no evidence of any leaking.       Presence of cardiac and vascular implant and graft 11/01/2017    Peroneal tendinitis 04/13/2017    Lumbar radiculopathy 06/02/2015     Note Last Updated: 11/13/2019     Had series of injections in his back in 2016.  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    Impaired fasting glucose 03/03/2015    Essential hypertension 09/30/2014    Chronic coronary artery disease 09/23/2014     Note Last Updated: 11/13/2019     Had MI on 2014 - hed emergent cath - had 2 stents      S/P AAA repair 08/24/2014    Carotid bruit 09/24/2013    Hay fever 03/22/2013    Inguinal hernia, right 04/27/2012    Arteriosclerotic vascular disease 01/31/2012    Benign prostatic hyperplasia with urinary obstruction 01/31/2012    Degenerative joint disease 01/31/2012    Inflammatory disease of prostate 01/31/2012    Systolic murmur 01/31/2012    Mixed hyperlipidemia 12/20/2011           Past Surgical History:   Procedure Laterality Date    ABDOMINAL AORTIC ANEURYSM REPAIR      AORTIC VALVE REPAIR/REPLACEMENT N/A 12/11/2023    Procedure: Transfemoral Transcatheter Aortic Valve Replacement;  Surgeon: Todd Boogie MD;  Location: [facility] HYBRID OR;  Service: Cardiovascular;  Laterality: N/A;  TAVR using 34mm Medtronic aortic valve.    AORTIC VALVE REPAIR/REPLACEMENT N/A 12/11/2023    Procedure: TRANSFEMORAL TRANSCATHETER AORTIC VALVE REPLACEMENT;  Surgeon: Hemal Sloan MD;  Location: [facility] HYBRID OR;  Service: Cardiothoracic;  Laterality: N/A;    BACK SURGERY  11/17/2016    CARDIAC CATHETERIZATION      CARDIAC CATHETERIZATION Right 11/16/2023    Procedure: Left Heart Cath with Coronary Angiography;  Surgeon: Todd Boogie MD;  Location: [facility] CATH INVASIVE LOCATION;  Service: Cardiovascular;  Laterality: Right;    CARDIAC ELECTROPHYSIOLOGY PROCEDURE N/A  "2023    Procedure: Pacemaker DC new, Medtronic 3830;  Surgeon: George Montejo MD;  Location: [facility] CATH INVASIVE LOCATION;  Service: Cardiovascular;  Laterality: N/A;    CATARACT EXTRACTION, BILATERAL      CORONARY ANGIOPLASTY WITH STENT PLACEMENT      INGUINAL HERNIA REPAIR      PROSTATE SURGERY      RENAL ARTERY STENT Left 2008     Current Outpatient Medications on File Prior to Visit   Medication Sig    acetaminophen (TYLENOL) 500 MG tablet Take 2 tablets by mouth Every 12 (Twelve) Hours.    aspirin (aspirin) 81 MG EC tablet Take 1 tablet by mouth Daily.    atorvastatin (LIPITOR) 10 MG tablet Take 1 tablet by mouth Every Night.    clopidogrel (PLAVIX) 75 MG tablet Take 1 tablet by mouth Daily.    HYDROcodone-acetaminophen (NORCO)  MG per tablet Take 1 tablet by mouth Every 8 (Eight) Hours As Needed.    metoprolol succinate XL (TOPROL-XL) 50 MG 24 hr tablet Take 1 tablet by mouth Daily.     No current facility-administered medications on file prior to visit.     No Known Allergies  Social History     Socioeconomic History    Marital status:    Tobacco Use    Smoking status: Former     Current packs/day: 0.00     Average packs/day: 0.3 packs/day for 64.9 years (16.2 ttl pk-yrs)     Types: Cigarettes     Start date:      Quit date: 2023     Years since quittin.2     Passive exposure: Current    Smokeless tobacco: Never   Vaping Use    Vaping status: Never Used   Substance and Sexual Activity    Alcohol use: No    Drug use: Never    Sexual activity: Not Currently     Family History   Family history unknown: Yes       Review of Systems    Objective   /78 (BP Location: Right arm, Patient Position: Sitting, Cuff Size: Adult)   Pulse 59   Temp 97.5 °F (36.4 °C) (Infrared)   Resp 18   Ht 172.7 cm (68\")   Wt 67.9 kg (149 lb 12.8 oz)   SpO2 96%   BMI 22.78 kg/m²   Physical Exam  Constitutional:       Appearance: He is well-developed.      Comments:      FERNANDO:      " Head: Normocephalic and atraumatic.   Eyes:      Conjunctiva/sclera: Conjunctivae normal.   Cardiovascular:      Rate and Rhythm: Normal rate.      Heart sounds: No murmur (hardly any murmur now) heard.  Pulmonary:      Effort: Pulmonary effort is normal.   Musculoskeletal:         General: Normal range of motion.      Cervical back: Normal range of motion.      Right lower leg: No edema.      Left lower leg: No edema.   Skin:     General: Skin is warm and dry.      Findings: No rash.   Neurological:      Mental Status: He is alert and oriented to person, place, and time.   Psychiatric:         Behavior: Behavior normal.           Admission on 12/11/2023, Discharged on 12/12/2023   Component Date Value Ref Range Status    Product Code 12/11/2023 U5336V42   Final    Unit Number 12/11/2023 [id number]   Final    UNIT  ABO 12/11/2023 B   Final    UNIT  RH 12/11/2023 POS   Final    Crossmatch Interpretation 12/11/2023 Compatible   Final    Dispense Status 12/11/2023 RE   Final    Blood Expiration Date 12/11/2023 202312272359   Final    Blood Type Barcode 12/11/2023 7300   Final    Product Code 12/11/2023 V7486P52   Final    Unit Number 12/11/2023 [id number]   Final    UNIT  ABO 12/11/2023 B   Final    UNIT  RH 12/11/2023 POS   Final    Crossmatch Interpretation 12/11/2023 Compatible   Final    Dispense Status 12/11/2023 RE   Final    Blood Expiration Date 12/11/2023 202401092359   Final    Blood Type Barcode 12/11/2023 7300   Final    Glucose 12/11/2023 105  70 - 105 mg/dL Final    Serial Number: [id number]:  [id number]    LVOT area 12/11/2023 2.5  cm2 Final    LVOT diam 12/11/2023 1.80  cm Final    SV(LVOT) 12/11/2023 50.6  ml Final    LV V1 max 12/11/2023 114.0  cm/sec Final    LV V1 max PG 12/11/2023 5.2  mmHg Final    LV V1 mean PG 12/11/2023 2.00  mmHg Final    LV V1 VTI 12/11/2023 19.9  cm Final    Ao pk analisa 12/11/2023 412.0  cm/sec Final    Ao max PG 12/11/2023 67.9  mmHg Final    Ao mean PG  12/11/2023 39.0  mmHg Final    Ao V2 VTI 12/11/2023 104.0  cm Final    ELLEN(I,D) 12/11/2023 0.49  cm2 Final    Ionized Calcium 12/11/2023 1.33 (H)  1.12 - 1.32 mmol/L Final    pH, Arterial 12/11/2023 7.330 (L)  7.350 - 7.450 pH units Final    pCO2, Arterial 12/11/2023 54.4 (H)  35.0 - 45.0 mm Hg Final    pO2, Arterial 12/11/2023 33.0 (C)  80.0 - 105.0 mm Hg Final    HCO3, Arterial 12/11/2023 28.9 (H)  22.0 - 26.0 mmol/L Final    Base Excess, Arterial 12/11/2023 3.0  0.0 - 3.0 mmol/L Final    O2 Saturation, Arterial 12/11/2023 58.0 (L)  95.0 - 98.0 % Final    Glucose 12/11/2023 104  70 - 105 mg/dL Final    Serial Number: [id number]:  [id number]    Sodium 12/11/2023 141  138 - 146 mmol/L Final    POC Potassium 12/11/2023 3.8  3.5 - 4.9 mmol/L Final    Hematocrit 12/11/2023 37 (L)  38 - 51 % Final    Hemoglobin 12/11/2023 12.6  12.0 - 17.0 g/dL Final    CO2 Content 12/11/2023 31 (H)  23 - 27 mmol/L Final    Ionized Calcium 12/11/2023 1.25  1.12 - 1.32 mmol/L Final    pH, Arterial 12/11/2023 7.300 (L)  7.350 - 7.450 pH units Final    pCO2, Arterial 12/11/2023 48.9 (H)  35.0 - 45.0 mm Hg Final    pO2, Arterial 12/11/2023 353.0 (H)  80.0 - 105.0 mm Hg Final    HCO3, Arterial 12/11/2023 23.8  22.0 - 26.0 mmol/L Final    Base Excess, Arterial 12/11/2023 <0.0 (L)  0.0 - 3.0 mmol/L Final    O2 Saturation, Arterial 12/11/2023 100.0 (H)  95.0 - 98.0 % Final    Glucose 12/11/2023 103  70 - 105 mg/dL Final    Serial Number: [id number]:  [id number]    Sodium 12/11/2023 139  138 - 146 mmol/L Final    POC Potassium 12/11/2023 3.7  3.5 - 4.9 mmol/L Final    Hematocrit 12/11/2023 33 (L)  38 - 51 % Final    Hemoglobin 12/11/2023 11.2 (L)  12.0 - 17.0 g/dL Final    CO2 Content 12/11/2023 25  23 - 27 mmol/L Final    Ao pk analisa 12/12/2023 113.0  cm/sec Final    Ao max PG 12/12/2023 5.1  mmHg Final    Ao mean PG 12/12/2023 3.0  mmHg Final    Ao V2 VTI 12/12/2023 21.4  cm Final    AI P1/2t 12/12/2023 367.0  msec Final    Magnesium 12/12/2023  2.0  1.6 - 2.4 mg/dL Final    WBC 12/12/2023 4.90  3.40 - 10.80 10*3/mm3 Final    RBC 12/12/2023 3.86 (L)  4.14 - 5.80 10*6/mm3 Final    Hemoglobin 12/12/2023 12.1 (L)  13.0 - 17.7 g/dL Final    Hematocrit 12/12/2023 36.2 (L)  37.5 - 51.0 % Final    MCV 12/12/2023 93.8  79.0 - 97.0 fL Final    MCH 12/12/2023 31.4  26.6 - 33.0 pg Final    MCHC 12/12/2023 33.5  31.5 - 35.7 g/dL Final    RDW 12/12/2023 14.5  12.3 - 15.4 % Final    RDW-SD 12/12/2023 47.3  37.0 - 54.0 fl Final    MPV 12/12/2023 7.2  6.0 - 12.0 fL Final    Platelets 12/12/2023 115 (L)  140 - 450 10*3/mm3 Final    Glucose 12/12/2023 102 (H)  65 - 99 mg/dL Final    BUN 12/12/2023 12  8 - 23 mg/dL Final    Creatinine 12/12/2023 0.64 (L)  0.76 - 1.27 mg/dL Final    Sodium 12/12/2023 138  136 - 145 mmol/L Final    Potassium 12/12/2023 4.0  3.5 - 5.2 mmol/L Final    Chloride 12/12/2023 103  98 - 107 mmol/L Final    CO2 12/12/2023 25.0  22.0 - 29.0 mmol/L Final    Calcium 12/12/2023 8.9  8.6 - 10.5 mg/dL Final    BUN/Creatinine Ratio 12/12/2023 18.8  7.0 - 25.0 Final    Anion Gap 12/12/2023 10.0  5.0 - 15.0 mmol/L Final    eGFR 12/12/2023 93.4  >60.0 mL/min/1.73 Final    Activated Clotting Time  12/11/2023 152 (H)  89 - 137 Seconds Final    Serial Number: [id number]:  [id number]    Activated Clotting Time  12/11/2023 309 (H)  89 - 137 Seconds Final    Serial Number: [id number]:  [id number]    Activated Clotting Time  12/11/2023 136  89 - 137 Seconds Final    Serial Number: [id number]:  [id number]    Activated Clotting Time  12/11/2023 239 (H)  89 - 137 Seconds Final    Serial Number: [id number]:  [id number]    Activated Clotting Time  12/11/2023 249 (H)  89 - 137 Seconds Final    Serial Number: [id number]:  [id number]    Activated Clotting Time  12/11/2023 287 (H)  89 - 137 Seconds Final    Serial Number: [id number]:  [id number]   Hospital Outpatient Visit on 12/07/2023   Component Date Value Ref Range Status    QT Interval 12/07/2023 411  ms Final    QTC Interval  12/07/2023 473  ms Final   Lab on 12/07/2023   Component Date Value Ref Range Status    Glucose 12/07/2023 117 (H)  65 - 99 mg/dL Final    BUN 12/07/2023 19  8 - 23 mg/dL Final    Creatinine 12/07/2023 0.74 (L)  0.76 - 1.27 mg/dL Final    Sodium 12/07/2023 139  136 - 145 mmol/L Final    Potassium 12/07/2023 3.9  3.5 - 5.2 mmol/L Final    Chloride 12/07/2023 99  98 - 107 mmol/L Final    CO2 12/07/2023 31.0 (H)  22.0 - 29.0 mmol/L Final    Calcium 12/07/2023 10.2  8.6 - 10.5 mg/dL Final    Total Protein 12/07/2023 7.4  6.0 - 8.5 g/dL Final    Albumin 12/07/2023 4.4  3.5 - 5.2 g/dL Final    ALT (SGPT) 12/07/2023 14  1 - 41 U/L Final    AST (SGOT) 12/07/2023 20  1 - 40 U/L Final    Alkaline Phosphatase 12/07/2023 92  39 - 117 U/L Final    Total Bilirubin 12/07/2023 0.8  0.0 - 1.2 mg/dL Final    Globulin 12/07/2023 3.0  gm/dL Final    A/G Ratio 12/07/2023 1.5  g/dL Final    BUN/Creatinine Ratio 12/07/2023 25.7 (H)  7.0 - 25.0 Final    Anion Gap 12/07/2023 9.0  5.0 - 15.0 mmol/L Final    eGFR 12/07/2023 89.3  >60.0 mL/min/1.73 Final    Protime 12/07/2023 10.9  9.6 - 11.7 Seconds Final    INR 12/07/2023 1.00  0.93 - 1.10 Final    MRSA PCR 12/07/2023 No MRSA Detected  No MRSA Detected Final    ABO Type 12/07/2023 B   Final    RH type 12/07/2023 Positive   Final    Antibody Screen 12/07/2023 Negative   Final    T&S Expiration Date 12/07/2023 12/13/2023 11:59:00 PM   Final    WBC 12/07/2023 3.70  3.40 - 10.80 10*3/mm3 Final    RBC 12/07/2023 4.33  4.14 - 5.80 10*6/mm3 Final    Hemoglobin 12/07/2023 13.6  13.0 - 17.7 g/dL Final    Hematocrit 12/07/2023 41.0  37.5 - 51.0 % Final    MCV 12/07/2023 94.6  79.0 - 97.0 fL Final    MCH 12/07/2023 31.5  26.6 - 33.0 pg Final    MCHC 12/07/2023 33.3  31.5 - 35.7 g/dL Final    RDW 12/07/2023 14.7  12.3 - 15.4 % Final    RDW-SD 12/07/2023 48.1  37.0 - 54.0 fl Final    MPV 12/07/2023 6.9  6.0 - 12.0 fL Final    Platelets 12/07/2023 162  140 - 450 10*3/mm3 Final    Neutrophil % 12/07/2023  61.3  42.7 - 76.0 % Final    Lymphocyte % 12/07/2023 17.9 (L)  19.6 - 45.3 % Final    Monocyte % 12/07/2023 11.7  5.0 - 12.0 % Final    Eosinophil % 12/07/2023 8.4 (H)  0.3 - 6.2 % Final    Basophil % 12/07/2023 0.7  0.0 - 1.5 % Final    Neutrophils, Absolute 12/07/2023 2.30  1.70 - 7.00 10*3/mm3 Final    Lymphocytes, Absolute 12/07/2023 0.70  0.70 - 3.10 10*3/mm3 Final    Monocytes, Absolute 12/07/2023 0.40  0.10 - 0.90 10*3/mm3 Final    Eosinophils, Absolute 12/07/2023 0.30  0.00 - 0.40 10*3/mm3 Final    Basophils, Absolute 12/07/2023 0.00  0.00 - 0.20 10*3/mm3 Final    nRBC 12/07/2023 0.1  0.0 - 0.2 /100 WBC Final    COVID19 12/07/2023 Not Detected  Not Detected - Ref. Range Final       BMI is within normal parameters. No other follow-up for BMI required.     Assessment & Plan   Diagnoses and all orders for this visit:    1. Aortic stenosis, severe (Primary)    2. S/P TAVR (transcatheter aortic valve replacement)    3. Heart block AV complete    4. Pacemaker    5. Mixed hyperlipidemia    6. Essential hypertension    7. Chronic coronary artery disease    8. Benign prostatic hyperplasia with urinary obstruction    9. Chronic midline low back pain with bilateral sciatica    Overall, we spent 20 minutes on the day of service discussing everything above and below.  He has had a good recovery from his TAVR.  Tolerating his pacemaker well.  No chest pain or shortness of breath.  Feels good there.  Blood pressure 153 is okay at 83 years old.  Asymptomatic with regard to coronary artery disease.  Able to void okay.  As above, his back pain is his only complaint.  I have nothing really to add to that.  Keep follow-up with pain management and neurosurgery regarding his back issues.  He has had labs done recently to check his cholesterol and PSA as above.  He had labs done in January just to follow his electrolytes and CBC.  Those are all normal.  No changes today.  Follow-up.  In 6 months or sooner if needed.        Call  with any problems or concerns before next visit       Return in about 6 months (around 9/18/2024).      Much of this report is an electronic transcription of spoken language to printed text using Dragon dictation software.  As such, the subtleties and finesse of spoken language may permit erroneous, or at times, nonsensical words or phrases to be inadvertently transcribed; thus changes may be made at a later date to rectify these errors.     Garima Jain [id number]/18/[id number]:13 EDT  This note has been electronically signed

## 2024-03-19 ENCOUNTER — HOSPITAL ENCOUNTER (OUTPATIENT)
Dept: CT IMAGING | Facility: HOSPITAL | Age: 84
Discharge: HOME | End: 2024-03-19
Admitting: NEUROLOGICAL SURGERY
Payer: MEDICARE

## 2024-03-19 DIAGNOSIS — M54.50 PAIN OF LUMBAR SPINE: ICD-10-CM

## 2024-03-19 PROCEDURE — 72131 CT LUMBAR SPINE W/O DYE: CPT

## 2024-06-07 ENCOUNTER — CLINICAL SUPPORT NO REQUIREMENTS (OUTPATIENT)
Dept: CARDIOLOGY | Facility: CLINIC | Age: 84
End: 2024-06-07
Payer: MEDICARE

## 2024-06-07 DIAGNOSIS — Z95.0 PACEMAKER: ICD-10-CM

## 2024-06-07 DIAGNOSIS — I44.2 HEART BLOCK AV COMPLETE: Primary | ICD-10-CM

## 2024-06-21 ENCOUNTER — TELEPHONE (OUTPATIENT)
Dept: CARDIOLOGY | Facility: CLINIC | Age: 84
End: 2024-06-21
Payer: MEDICARE

## 2024-06-21 DIAGNOSIS — I48.91 NEW ONSET ATRIAL FIBRILLATION: Primary | ICD-10-CM

## 2024-06-21 NOTE — TELEPHONE ENCOUNTER
Attempted to call patient, but his voicemail box is not set up so I could not leave a message. The patient has new onset atrial fibrillation so Dr. Boogie would like to start him on Eliquis 5 mg twice daily for stroke prophylaxis. He should continue taking Plavix 75 mg daily for his coronary artery disease, but he can stop taking aspirin.    no

## 2024-06-21 NOTE — TELEPHONE ENCOUNTER
Spoke with the patient & discussed starting Eliquis, he wanted a 30 day prescription sent to [facility]. He also wanted to know if he could cut his in half, like Dr. Boogie told his wife to do. I advised to take the full amount and he has an appointment next month with Dr. Boogie and he can discuss this with him at that time.

## 2024-06-21 NOTE — TELEPHONE ENCOUNTER
Patient started A Fib on 6/20/24, 21 hours and 59 minutes and in process. Please see [facility] report. No anticoagulation at this time.

## 2024-06-27 ENCOUNTER — TELEPHONE (OUTPATIENT)
Dept: CARDIOLOGY | Facility: CLINIC | Age: 84
End: 2024-06-27
Payer: MEDICARE

## 2024-06-27 NOTE — TELEPHONE ENCOUNTER
Caller: RAJWINDER    Relationship: SELF    Best call back number: [phone]      What was the call regarding: CALLED TO LET DR. WEISS KNOW HE IS STOPPING PLAVIX AND STARTING ELIQUIS ON 6/28/24

## 2024-06-28 NOTE — TELEPHONE ENCOUNTER
I spoke with the patient on Weds in Telephone & again today. Explained again that he still needs to take the Plavix with the Eliquis & how they work in 2 separate ways. Patient states that he understands.

## 2024-08-03 NOTE — PROGRESS NOTES
Encounter Date:08/07/2024        Patient ID: Yordy Hebert is a 83 y.o. male.      Chief Complaint:      History of Present Illness  83-year-old with history of CAD, hypertension, hyperlipidemia, PVD, AAA status postrepair, severe aortic stenosis status post transcatheter transfemoral aortic valve replacement with a 34 mm Medtronic valve who presents for follow-up 1 week after his TAVR.  TAVR was complicated by complete heart block for which he underwent pacemaker placement.  He is doing well since his procedure.  Denies any chest pain or shortness of breath.  Does have a little bit of fatigue.  No access site issues.  He has significant bruising on his chest and bruises easily.  Denies any pain at his pacemaker site.  No dizziness or lightheadedness.   ECG in the office today shows atrial fibrillation with intermittent pacing. Heart rate 94,  and QTc 485 ms  Today he feels well and denies any chest pain or shortness of breath.  He is in NYHA class I-II.    He wants to undergo ablation procedure for his back pain.    The following portions of the patient's history were reviewed and updated as appropriate: allergies, current medications, past family history, past medical history, past social history, past surgical history, and problem list.    Review of Systems   Constitutional: Negative for malaise/fatigue.   Cardiovascular:  Negative for chest pain, dyspnea on exertion, leg swelling and palpitations.   Respiratory:  Negative for cough and shortness of breath.    Gastrointestinal:  Negative for abdominal pain, nausea and vomiting.   Neurological:  Negative for dizziness, focal weakness, headaches, light-headedness and numbness.   All other systems reviewed and are negative.      Current Outpatient Medications:     acetaminophen (TYLENOL) 500 MG tablet, Take 2 tablets by mouth Every 12 (Twelve) Hours., Disp: , Rfl:     apixaban (ELIQUIS) 5 MG tablet tablet, Take 1 tablet by mouth 2 (Two) Times a Day., Disp:  180 tablet, Rfl: 3    atorvastatin (LIPITOR) 10 MG tablet, Take 1 tablet by mouth Every Night., Disp: 90 tablet, Rfl: 3    clopidogrel (PLAVIX) 75 MG tablet, Take 1 tablet by mouth Daily., Disp: 90 tablet, Rfl: 3    HYDROcodone-acetaminophen (NORCO)  MG per tablet, Take 1 tablet by mouth Every 8 (Eight) Hours As Needed., Disp: , Rfl:     metoprolol succinate XL (TOPROL-XL) 50 MG 24 hr tablet, Take 1 tablet by mouth Daily., Disp: 90 tablet, Rfl: 3    Current outpatient and discharge medications have been reconciled for the patient.  Reviewed by: Todd Boogie MD       No Known Allergies    Family History   Family history unknown: Yes       Past Surgical History:   Procedure Laterality Date    ABDOMINAL AORTIC ANEURYSM REPAIR      AORTIC VALVE REPAIR/REPLACEMENT N/A 12/11/2023    Procedure: Transfemoral Transcatheter Aortic Valve Replacement;  Surgeon: Todd Boogie MD;  Location: [facility] HYBRID OR;  Service: Cardiovascular;  Laterality: N/A;  TAVR using 34mm Medtronic aortic valve.    AORTIC VALVE REPAIR/REPLACEMENT N/A 12/11/2023    Procedure: TRANSFEMORAL TRANSCATHETER AORTIC VALVE REPLACEMENT;  Surgeon: Hemal Sloan MD;  Location: [facility] HYBRID OR;  Service: Cardiothoracic;  Laterality: N/A;    BACK SURGERY  11/17/2016    CARDIAC CATHETERIZATION      CARDIAC CATHETERIZATION Right 11/16/2023    Procedure: Left Heart Cath with Coronary Angiography;  Surgeon: Todd Boogie MD;  Location: [facility] CATH INVASIVE LOCATION;  Service: Cardiovascular;  Laterality: Right;    CARDIAC ELECTROPHYSIOLOGY PROCEDURE N/A 12/11/2023    Procedure: Pacemaker DC new, Medtronic 3830;  Surgeon: George Montejo MD;  Location: [facility] CATH INVASIVE LOCATION;  Service: Cardiovascular;  Laterality: N/A;    CATARACT EXTRACTION, BILATERAL      CORONARY ANGIOPLASTY WITH STENT PLACEMENT      INGUINAL HERNIA REPAIR      PROSTATE SURGERY      RENAL ARTERY STENT Left 06/11/2008       Past Medical History:   Diagnosis Date    Coronary  artery disease     DJD (degenerative joint disease)     (L) hip    Family history of abdominal aortic aneurysm (AAA) repair 2012    Foot pain, left 2017    History of AAA (abdominal aortic aneurysm) repair     Hyperlipidemia     Hypertension     Inguinal hernia     right    Prostatitis        Family History   Family history unknown: Yes       Social History     Socioeconomic History    Marital status:    Tobacco Use    Smoking status: Former     Current packs/day: 0.00     Average packs/day: 0.3 packs/day for 64.9 years (16.2 ttl pk-yrs)     Types: Cigarettes     Start date:      Quit date: 2023     Years since quittin.6     Passive exposure: Current    Smokeless tobacco: Never   Vaping Use    Vaping status: Never Used   Substance and Sexual Activity    Alcohol use: No    Drug use: Never    Sexual activity: Not Currently               Objective:       Physical Exam    There were no vitals taken for this visit.  The patient is alert, oriented and in no distress.    Vital signs as noted above.    Head and neck revealed no carotid bruits or jugular venous distension.  No thyromegaly or lymphadenopathy is present.    Lungs clear.  No wheezing.  Breath sounds are normal bilaterally.    Heart normal first and second heart sounds.  No murmur..  No pericardial rub is present.  No gallop is present.    Abdomen soft and nontender.  No organomegaly is present.    Extremities revealed good peripheral pulses without any pedal edema.    Skin warm and dry.    Musculoskeletal system is grossly normal.    CNS grossly normal.          No diagnosis found.LAB RESULTS (LAST 7 DAYS)    CBC        BMP        CMP         BNP        TROPONIN        CoAg        Creatinine Clearance  CrCl cannot be calculated (Patient's most recent lab result is older than the maximum 30 days allowed.).    ABG        Radiology  No radiology results for the last day    EKG  Procedures    Stress test      Echocardiogram  Results  for orders placed during the hospital encounter of 12/11/23    Adult Transthoracic Echo Limited with Contrast if Necessary Per Protocol POD #1    Interpretation Summary    Left ventricular ejection fraction appears to be 51 - 55%.    There is a TAVR valve present.    34 mm Medtronic valve is well-seated.  There is none to trace perivalvular leak.  Mean gradient is 3 mmHg.      Cardiac catheterization  Results for orders placed during the hospital encounter of 12/11/23    Cardiac Catheterization/Vascular Study    Conclusion  Indication:  Severe symptomatic nonrheumatic aortic stenosis  Congestive heart failure, NYHA class III    Procedures performed  Ultrasound-guided vascular access  Common femoral angiography  Balloon aortic valvuloplasty  Supravalvular aortogram  Temporary pacemaker placement  Transcatheter aortic valve replacement with 34 mm Evolut pro plus via left femoral arterial access  Aorto abdominal aortogram with bilateral iliofemoral runoff  Perclose and Mynx deployment    Procedural Details  The procedure was performed under conscious sedation in the hybrid OR.    Under ultrasound guidance, a 6 F introducer was placed in the right femoral vein using modified Seldinger technique. Under ultrasound guidance, a 7 F introducer was placed in the left femoral artery using modified Seldinger technique. Under ultrasound guidance, a 7 F introducer was placed in the right femoral artery using modified Seldinger technique.    Two Perclose ProGlide devices were used to 'pre-close' the LFA access site. A 5F bipolar electrode temporary pacemaking wire was inserted via the right femoral venous sheath and positioned using fluoroscopy. Pacing threshold was tested. The temporary pacemaker wire was secured in place.    A 6F pigtail catheter was placed in the aortic root via the RFA sheath. The aortic valve was crossed retrograde with a 0.035 inch straight wire through a 6F AL1 catheter via the LFA 18Fr sheath. The AL1  catheter was advanced into the LV and a exchange length 0.035 inch lunderquist wire in the LV.  We then performed a balloon aortic valvuloplasty under rapid pacing at 180 bpm with a 20 x 45 mm true balloon.    The 18F sheath was removed and the Medtronic delivery sheath was inserted over the lunderquist wire.    The TAVR procedure was then performed using a 34 mm Evolut pro + device. It was advanced over the lunderquist wire to the aortic valve position. After confirming correct position of the valve by fluoroscopy, the valve was implanted with ventricular pacing and dynamic aortography.  After deployment we noted an insole in the valve frame causing significant constraint in the valve.  We then advanced a 24 x 45 mm true balloon and performed postdilatation balloon aortic valvuloplasty under rapid pacing at 180 bpm.  The patient at this time developed complete heart block and we performed backup pacing at 80 bpm.    After valve deployment the valve was well-seated and there was no significant paravalvular leak noted.  This was confirmed by fluoroscopic views as well as echocardiogram.  Final gradient across the valve was 2 mmHg.    At the end of the procedure, the Medtronic delivery system was removed over a guidewire. The Perclose devices were deployed to secure the arterial access on the TAVR side which was left femoral access.    Distal aortic, iliac and femoral angiography was performed with no evidence of dissection, aneurysm or vascular injury.    The 7F RFA sheath was removed and the arteriotomy was closed with a Mynx closure device. The 6F RFV sheath along with the temporary pacemaker wire were left in place in anticipation for permanent pacemaker placement later today.  The patient was transferred to [facility]  in stable condition.    Lee Boogie and Nathalia performed the TAVR procedure. Dr. Jalloh assisted.    Estimated blood loss  20 mL    Complications  Complete heart block requiring permanent pacemaker  placement.    Recommendations  Permanent pacemaker placement  Dual antiplatelet therapy  Cardiac rehab  Repeat echocardiogram and ECG in morning.    Electronically signed by Todd Boogie MD, 12/11/23, 9:15 AM EST.          Assessment and Plan       There are no diagnoses linked to this encounter.     Severe aortic stenosis  Status post transcatheter transfemoral aortic valve replacement with 34 mm Medtronic valve  Cardiac rehab referral in [place]  Currently on Eliquis for atrial fibrillation  Echocardiogram shows well-seated and functioning valve there is mild aortic regurgitation.  EF has reduced 40% from normal most likely secondary to multiple PVCs.  Continue Toprol-XL  Unable to tolerate ACE inhibitor due to low blood pressure.  Repeat echocardiogram in 6 to 8 months     Complete heart block/PVCs  Permanent pacemaker in place  ECG shows frequent PVCs and bigeminy pattern  I will increase the dose of beta-blocker today    Paroxysmal atrial fibrillation  Detected on pacemaker  [id number] score is 5  Continue Eliquis  Continue Toprol-XL for rate control  Permanent pacemaker in place    Coronary artery disease  Patient reports previous PCI with 2 stents in 2014  Cardiac catheterization prior to TAVR showed stable CAD in the LAD  Continue Eliquis, statin and metoprolol  Aspirin and Plavix have been stopped while the patient is on Eliquis  Currently chest pain-free     Hypertension  Well-controlled  Continue with metoprolol     Hyperlipidemia  On Lipitor  LDL 79, HDL 54, triglyceride 92 and total cholesterol 150  Complains of myalgias     Peripheral arterial disease  AAA status post repair  CT scan shows CT shows aortobiiliac bypass graft appears patent without significant  stenosis.  Left greater than right SFA and popliteal artery disease, likely  hemodynamically significant on the left.  Moderate multifocal infrapopliteal disease with two-vessel runoff bilaterally  Bilateral ZARI normal  Focus on hypertension and  heart rate control with beta-blocker  Continue statin and Eliquis     BPH   Continue tamsulosin

## 2024-08-07 ENCOUNTER — OFFICE VISIT (OUTPATIENT)
Dept: CARDIOLOGY | Facility: CLINIC | Age: 84
End: 2024-08-07
Payer: MEDICARE

## 2024-08-07 VITALS
HEART RATE: 94 BPM | DIASTOLIC BLOOD PRESSURE: 49 MMHG | HEIGHT: 68 IN | WEIGHT: 150 LBS | BODY MASS INDEX: 22.73 KG/M2 | SYSTOLIC BLOOD PRESSURE: 127 MMHG | OXYGEN SATURATION: 96 %

## 2024-08-07 DIAGNOSIS — Z95.2 S/P TAVR (TRANSCATHETER AORTIC VALVE REPLACEMENT): Primary | ICD-10-CM

## 2024-08-07 DIAGNOSIS — I48.91 NEW ONSET ATRIAL FIBRILLATION: ICD-10-CM

## 2024-08-07 DIAGNOSIS — I10 ESSENTIAL HYPERTENSION: ICD-10-CM

## 2024-08-07 DIAGNOSIS — I50.22 CHRONIC HFREF (HEART FAILURE WITH REDUCED EJECTION FRACTION): ICD-10-CM

## 2024-08-07 DIAGNOSIS — Z86.79 S/P AAA REPAIR: ICD-10-CM

## 2024-08-07 DIAGNOSIS — I25.10 CHRONIC CORONARY ARTERY DISEASE: ICD-10-CM

## 2024-08-07 DIAGNOSIS — E78.2 MIXED HYPERLIPIDEMIA: ICD-10-CM

## 2024-08-07 DIAGNOSIS — I72.3 ANEURYSM OF ILIAC ARTERY: ICD-10-CM

## 2024-08-07 DIAGNOSIS — I73.9 PERIPHERAL VASCULAR DISEASE, UNSPECIFIED: ICD-10-CM

## 2024-08-07 DIAGNOSIS — I35.0 AORTIC STENOSIS, SEVERE: ICD-10-CM

## 2024-08-07 DIAGNOSIS — Z98.890 S/P AAA REPAIR: ICD-10-CM

## 2024-08-07 DIAGNOSIS — I71.40 ABDOMINAL AORTIC ANEURYSM (AAA) WITHOUT RUPTURE, UNSPECIFIED PART: ICD-10-CM

## 2024-08-07 DIAGNOSIS — I44.2 COMPLETE HEART BLOCK: ICD-10-CM

## 2024-08-07 DIAGNOSIS — I44.2 HEART BLOCK AV COMPLETE: ICD-10-CM

## 2024-08-07 DIAGNOSIS — Z95.0 PACEMAKER: ICD-10-CM

## 2024-08-07 RX ORDER — ASPIRIN 81 MG/1
81 TABLET, CHEWABLE ORAL DAILY
COMMUNITY

## 2024-09-18 ENCOUNTER — OFFICE VISIT (OUTPATIENT)
Dept: FAMILY MEDICINE CLINIC | Facility: CLINIC | Age: 84
End: 2024-09-18
Payer: MEDICARE

## 2024-09-18 VITALS
RESPIRATION RATE: 18 BRPM | BODY MASS INDEX: 23.07 KG/M2 | SYSTOLIC BLOOD PRESSURE: 156 MMHG | DIASTOLIC BLOOD PRESSURE: 84 MMHG | HEART RATE: 74 BPM | WEIGHT: 152.2 LBS | OXYGEN SATURATION: 96 % | HEIGHT: 68 IN | TEMPERATURE: 96.8 F

## 2024-09-18 DIAGNOSIS — I25.10 CHRONIC CORONARY ARTERY DISEASE: ICD-10-CM

## 2024-09-18 DIAGNOSIS — I10 ESSENTIAL HYPERTENSION: Primary | ICD-10-CM

## 2024-09-18 DIAGNOSIS — I44.2 HEART BLOCK AV COMPLETE: ICD-10-CM

## 2024-09-18 DIAGNOSIS — Z95.2 S/P TAVR (TRANSCATHETER AORTIC VALVE REPLACEMENT): ICD-10-CM

## 2024-09-18 DIAGNOSIS — E78.2 MIXED HYPERLIPIDEMIA: ICD-10-CM

## 2024-09-18 DIAGNOSIS — Z12.5 ENCOUNTER FOR PROSTATE CANCER SCREENING: ICD-10-CM

## 2024-09-18 DIAGNOSIS — Z95.0 PACEMAKER: ICD-10-CM

## 2024-09-18 PROCEDURE — 99214 OFFICE O/P EST MOD 30 MIN: CPT | Performed by: FAMILY MEDICINE

## 2024-09-18 PROCEDURE — 1126F AMNT PAIN NOTED NONE PRSNT: CPT | Performed by: FAMILY MEDICINE

## 2024-09-18 PROCEDURE — 1160F RVW MEDS BY RX/DR IN RCRD: CPT | Performed by: FAMILY MEDICINE

## 2024-09-18 PROCEDURE — 3077F SYST BP >= 140 MM HG: CPT | Performed by: FAMILY MEDICINE

## 2024-09-18 PROCEDURE — 1159F MED LIST DOCD IN RCRD: CPT | Performed by: FAMILY MEDICINE

## 2024-09-18 PROCEDURE — 3079F DIAST BP 80-89 MM HG: CPT | Performed by: FAMILY MEDICINE

## 2024-10-27 NOTE — PROGRESS NOTES
Encounter Date:11/13/2024        Patient ID: Yordy Hebert is a 83 y.o. male.      Chief Complaint:      History of Present Illness  83-year-old with history of CAD, hypertension, hyperlipidemia, PVD, AAA status postrepair, severe aortic stenosis status post transcatheter transfemoral aortic valve replacement with a 34 mm Medtronic valve who presents for follow-up 1 week after his TAVR.  TAVR was complicated by complete heart block for which he underwent pacemaker placement.  He is doing well since his procedure.  Denies any chest pain or shortness of breath.  Does have a little bit of fatigue.  No access site issues.  He has significant bruising on his chest and bruises easily.  Denies any pain at his pacemaker site.  No dizziness or lightheadedness.   ECG showed atrial fibrillation with ventricular pacing. Heart rate 62,  and QTc 462 ms  Today he feels well and denies any chest pain or shortness of breath.  He is in NYHA class I-II.    His main concern is lower back pain.  He underwent?  Ablation however he continues to report significant back pain which is not alleviated with analgesics.    Current cardiac medications include aspirin, Eliquis, Lipitor and Toprol-XL       The following portions of the patient's history were reviewed and updated as appropriate: allergies, current medications, past family history, past medical history, past social history, past surgical history, and problem list.    Review of Systems   Constitutional: Negative for malaise/fatigue.   Cardiovascular:  Negative for chest pain, dyspnea on exertion, leg swelling and palpitations.   Respiratory:  Positive for shortness of breath. Negative for cough.    Gastrointestinal:  Negative for abdominal pain, nausea and vomiting.   Neurological:  Negative for dizziness, focal weakness, headaches, light-headedness and numbness.   All other systems reviewed and are negative.        Current Outpatient Medications:     acetaminophen (TYLENOL)  500 MG tablet, Take 2 tablets by mouth Every 12 (Twelve) Hours., Disp: , Rfl:     apixaban (ELIQUIS) 5 MG tablet tablet, Take 1 tablet by mouth 2 (Two) Times a Day., Disp: 180 tablet, Rfl: 3    aspirin 81 MG chewable tablet, Chew 1 tablet Daily., Disp: , Rfl:     atorvastatin (LIPITOR) 10 MG tablet, Take 1 tablet by mouth Every Night., Disp: 90 tablet, Rfl: 3    HYDROcodone-acetaminophen (NORCO)  MG per tablet, Take 1 tablet by mouth Every 8 (Eight) Hours As Needed., Disp: , Rfl:     metoprolol succinate XL (TOPROL-XL) 50 MG 24 hr tablet, Take 1 tablet by mouth Daily., Disp: 90 tablet, Rfl: 3    Current outpatient and discharge medications have been reconciled for the patient.  Reviewed by: Todd Boogie MD       No Known Allergies    Family History   Family history unknown: Yes       Past Surgical History:   Procedure Laterality Date    ABDOMINAL AORTIC ANEURYSM REPAIR      AORTIC VALVE REPAIR/REPLACEMENT N/A 12/11/2023    Procedure: Transfemoral Transcatheter Aortic Valve Replacement;  Surgeon: Todd Boogie MD;  Location: [facility] OR;  Service: Cardiovascular;  Laterality: N/A;  TAVR using 34mm Medtronic aortic valve.    AORTIC VALVE REPAIR/REPLACEMENT N/A 12/11/2023    Procedure: TRANSFEMORAL TRANSCATHETER AORTIC VALVE REPLACEMENT;  Surgeon: Hemal Sloan MD;  Location: [facility] HYBRID OR;  Service: Cardiothoracic;  Laterality: N/A;    BACK SURGERY  11/17/2016    CARDIAC CATHETERIZATION      CARDIAC CATHETERIZATION Right 11/16/2023    Procedure: Left Heart Cath with Coronary Angiography;  Surgeon: Todd Boogie MD;  Location: [facility] CATH INVASIVE LOCATION;  Service: Cardiovascular;  Laterality: Right;    CARDIAC ELECTROPHYSIOLOGY PROCEDURE N/A 12/11/2023    Procedure: Pacemaker DC new, Medtronic 3830;  Surgeon: George Montejo MD;  Location: [facility] CATH INVASIVE LOCATION;  Service: Cardiovascular;  Laterality: N/A;    CATARACT EXTRACTION, BILATERAL      CORONARY ANGIOPLASTY WITH STENT PLACEMENT    "   INGUINAL HERNIA REPAIR      PROSTATE SURGERY      RADIOFREQUENCY ABLATION  2024    RENAL ARTERY STENT Left 2008       Past Medical History:   Diagnosis Date    Coronary artery disease     DJD (degenerative joint disease)     (L) hip    Family history of abdominal aortic aneurysm (AAA) repair 2012    Foot pain, left 2017    History of AAA (abdominal aortic aneurysm) repair     Hyperlipidemia     Hypertension     Inguinal hernia     right    Prostatitis        Family History   Family history unknown: Yes       Social History     Socioeconomic History    Marital status:    Tobacco Use    Smoking status: Former     Current packs/day: 0.00     Average packs/day: 0.3 packs/day for 64.9 years (16.2 ttl pk-yrs)     Types: Cigarettes     Start date:      Quit date: 2023     Years since quittin.9     Passive exposure: Current    Smokeless tobacco: Never   Vaping Use    Vaping status: Never Used   Substance and Sexual Activity    Alcohol use: No    Drug use: Never    Sexual activity: Not Currently               Objective:       Physical Exam    /59 (BP Location: Left arm, Patient Position: Sitting, Cuff Size: Adult)   Pulse 62   Ht 172.7 cm (68\")   Wt 69.4 kg (153 lb)   SpO2 92%   BMI 23.26 kg/m²   The patient is alert, oriented and in no distress.    Vital signs as noted above.    Head and neck revealed no carotid bruits or jugular venous distension.  No thyromegaly or lymphadenopathy is present.    Lungs clear.  No wheezing.  Breath sounds are normal bilaterally.    Heart normal first and second heart sounds.  No murmur..  No pericardial rub is present.  No gallop is present.    Abdomen soft and nontender.  No organomegaly is present.    Extremities revealed good peripheral pulses without any pedal edema.    Skin warm and dry.    Musculoskeletal system is grossly normal.    CNS grossly normal.           Diagnosis Plan   1. S/P TAVR (transcatheter aortic valve " replacement)  metoprolol succinate XL (TOPROL-XL) 50 MG 24 hr tablet      2. New onset atrial fibrillation        3. Heart block AV complete        4. Pacemaker        5. Essential hypertension        6. Chronic coronary artery disease        7. Mixed hyperlipidemia  atorvastatin (LIPITOR) 10 MG tablet      8. Aortic stenosis, severe        9. Peripheral vascular disease, unspecified        10. S/P AAA repair        11. Abdominal aortic aneurysm (AAA) without rupture, unspecified part        12. Chronic HFrEF (heart failure with reduced ejection fraction)  metoprolol succinate XL (TOPROL-XL) 50 MG 24 hr tablet      13. Complete heart block        14. Aneurysm of iliac artery        15. History of BPH        LAB RESULTS (LAST 7 DAYS)    CBC        BMP        CMP         BNP        TROPONIN        CoAg        Creatinine Clearance  Estimated Creatinine Clearance: 62.8 mL/min (by C-G formula based on SCr of 0.86 mg/dL).    ABG        Radiology  No radiology results for the last day    EKG  Procedures    Stress test      Echocardiogram  Results for orders placed during the hospital encounter of 12/11/23    Adult Transthoracic Echo Limited with Contrast if Necessary Per Protocol POD #1    Interpretation Summary    Left ventricular ejection fraction appears to be 51 - 55%.    There is a TAVR valve present.    34 mm Medtronic valve is well-seated.  There is none to trace perivalvular leak.  Mean gradient is 3 mmHg.      Cardiac catheterization  Results for orders placed during the hospital encounter of 12/11/23    Cardiac Catheterization/Vascular Study    Conclusion  Indication:  Severe symptomatic nonrheumatic aortic stenosis  Congestive heart failure, NYHA class III    Procedures performed  Ultrasound-guided vascular access  Common femoral angiography  Balloon aortic valvuloplasty  Supravalvular aortogram  Temporary pacemaker placement  Transcatheter aortic valve replacement with 34 mm Evolut pro plus via left femoral  arterial access  Aorto abdominal aortogram with bilateral iliofemoral runoff  Perclose and Mynx deployment    Procedural Details  The procedure was performed under conscious sedation in the hybrid OR.    Under ultrasound guidance, a 6 F introducer was placed in the right femoral vein using modified Seldinger technique. Under ultrasound guidance, a 7 F introducer was placed in the left femoral artery using modified Seldinger technique. Under ultrasound guidance, a 7 F introducer was placed in the right femoral artery using modified Seldinger technique.    Two Perclose ProGlide devices were used to 'pre-close' the LFA access site. A 5F bipolar electrode temporary pacemaking wire was inserted via the right femoral venous sheath and positioned using fluoroscopy. Pacing threshold was tested. The temporary pacemaker wire was secured in place.    A 6F pigtail catheter was placed in the aortic root via the RFA sheath. The aortic valve was crossed retrograde with a 0.035 inch straight wire through a 6F AL1 catheter via the LFA 18Fr sheath. The AL1 catheter was advanced into the LV and a exchange length 0.035 inch lunderquist wire in the LV.  We then performed a balloon aortic valvuloplasty under rapid pacing at 180 bpm with a 20 x 45 mm true balloon.    The 18F sheath was removed and the Medtronic delivery sheath was inserted over the lunderquist wire.    The TAVR procedure was then performed using a 34 mm Evolut pro + device. It was advanced over the lunderquist wire to the aortic valve position. After confirming correct position of the valve by fluoroscopy, the valve was implanted with ventricular pacing and dynamic aortography.  After deployment we noted an insole in the valve frame causing significant constraint in the valve.  We then advanced a 24 x 45 mm true balloon and performed postdilatation balloon aortic valvuloplasty under rapid pacing at 180 bpm.  The patient at this time developed complete heart block and we  performed backup pacing at 80 bpm.    After valve deployment the valve was well-seated and there was no significant paravalvular leak noted.  This was confirmed by fluoroscopic views as well as echocardiogram.  Final gradient across the valve was 2 mmHg.    At the end of the procedure, the Medtronic delivery system was removed over a guidewire. The Perclose devices were deployed to secure the arterial access on the TAVR side which was left femoral access.    Distal aortic, iliac and femoral angiography was performed with no evidence of dissection, aneurysm or vascular injury.    The 7F RFA sheath was removed and the arteriotomy was closed with a Mynx closure device. The 6F RFV sheath along with the temporary pacemaker wire were left in place in anticipation for permanent pacemaker placement later today.  The patient was transferred to [facility]  in stable condition.    Lee Boogie and Nathalia performed the TAVR procedure. Dr. Jalloh assisted.    Estimated blood loss  20 mL    Complications  Complete heart block requiring permanent pacemaker placement.    Recommendations  Permanent pacemaker placement  Dual antiplatelet therapy  Cardiac rehab  Repeat echocardiogram and ECG in morning.    Electronically signed by Todd Boogie MD, 12/11/23, 9:15 AM EST.          Assessment and Plan       Diagnoses and all orders for this visit:    1. S/P TAVR (transcatheter aortic valve replacement) (Primary)  -     metoprolol succinate XL (TOPROL-XL) 50 MG 24 hr tablet; Take 1 tablet by mouth Daily.  Dispense: 90 tablet; Refill: 3    2. New onset atrial fibrillation    3. Heart block AV complete    4. Pacemaker    5. Essential hypertension    6. Chronic coronary artery disease  Overview:  Had MI on 2014 - [facility] emergent cath - had 2 stents      7. Mixed hyperlipidemia  -     atorvastatin (LIPITOR) 10 MG tablet; Take 1 tablet by mouth Every Night.  Dispense: 90 tablet; Refill: 3    8. Aortic stenosis, severe    9. Peripheral vascular disease,  unspecified    10. S/P AAA repair    11. Abdominal aortic aneurysm (AAA) without rupture, unspecified part  Overview:  Had AAA repaired in '08 with a sleeve - repeat CT in 9/19 - no evidence of any leaking.       12. Chronic HFrEF (heart failure with reduced ejection fraction)  -     metoprolol succinate XL (TOPROL-XL) 50 MG 24 hr tablet; Take 1 tablet by mouth Daily.  Dispense: 90 tablet; Refill: 3    13. Complete heart block    14. Aneurysm of iliac artery    15. History of BPH       Valvular heart disease  Severe aortic stenosis  Status post transcatheter transfemoral aortic valve replacement with 34 mm Medtronic valve  Cardiac rehab referral in [place]  Currently on Eliquis for atrial fibrillation  Echocardiogram shows well-seated and functioning valve there is mild aortic regurgitation.  EF has reduced 40% from normal most likely secondary to multiple PVCs.  Continue Toprol-XL  Unable to tolerate ACE inhibitor due to low blood pressure.  Repeat echocardiogram today shows moderate to severe mitral and tricuspid valve regurgitation  I have discussed possibility of mitral and tricuspid clip however the patient is currently asymptomatic.  Pulmonary hypertension with RVSP 63 mmHg     Complete heart block/PVCs  Permanent pacemaker in place  ECG shows frequent PVCs and bigeminy pattern  Continue Toprol-XL     Paroxysmal atrial fibrillation  Detected on pacemaker  [id number] score is 5  Continue Eliquis  Continue Toprol-XL for rate control  Permanent pacemaker in place     Coronary artery disease  Patient reports previous PCI with 2 stents in 2014  Cardiac catheterization prior to TAVR showed stable CAD in the LAD  Continue Eliquis, statin and metoprolol  Aspirin and Plavix have been stopped while the patient is on Eliquis  Currently chest pain-free     Hypertension  Well-controlled  Continue with metoprolol     Hyperlipidemia  On Lipitor  LDL 79, HDL 54, triglyceride 92 and total cholesterol 150  Complains of  myalgias     Peripheral arterial disease  AAA status post repair  CT scan shows CT shows aortobiiliac bypass graft appears patent without significant  stenosis.  Left greater than right SFA and popliteal artery disease, likely  hemodynamically significant on the left.  Moderate multifocal infrapopliteal disease with two-vessel runoff bilaterally  Bilateral ZARI normal  Focus on hypertension and heart rate control with beta-blocker  Continue statin and Eliquis    Low back pain  Follow-up with pain clinic  Currently on Norco     BPH   Continue tamsulosin

## 2024-11-13 ENCOUNTER — OFFICE VISIT (OUTPATIENT)
Dept: CARDIOLOGY | Facility: CLINIC | Age: 84
End: 2024-11-13
Payer: MEDICARE

## 2024-11-13 ENCOUNTER — OUTSIDE FACILITY SERVICE (OUTPATIENT)
Dept: CARDIOLOGY | Facility: CLINIC | Age: 84
End: 2024-11-13
Payer: MEDICARE

## 2024-11-13 VITALS
HEART RATE: 62 BPM | BODY MASS INDEX: 23.19 KG/M2 | DIASTOLIC BLOOD PRESSURE: 59 MMHG | WEIGHT: 153 LBS | HEIGHT: 68 IN | SYSTOLIC BLOOD PRESSURE: 130 MMHG | OXYGEN SATURATION: 92 %

## 2024-11-13 DIAGNOSIS — I25.10 CHRONIC CORONARY ARTERY DISEASE: ICD-10-CM

## 2024-11-13 DIAGNOSIS — I72.3 ANEURYSM OF ILIAC ARTERY: ICD-10-CM

## 2024-11-13 DIAGNOSIS — I44.2 HEART BLOCK AV COMPLETE: ICD-10-CM

## 2024-11-13 DIAGNOSIS — I48.91 NEW ONSET ATRIAL FIBRILLATION: ICD-10-CM

## 2024-11-13 DIAGNOSIS — I50.22 CHRONIC HFREF (HEART FAILURE WITH REDUCED EJECTION FRACTION): ICD-10-CM

## 2024-11-13 DIAGNOSIS — I73.9 PERIPHERAL VASCULAR DISEASE, UNSPECIFIED: ICD-10-CM

## 2024-11-13 DIAGNOSIS — Z95.2 S/P TAVR (TRANSCATHETER AORTIC VALVE REPLACEMENT): Primary | ICD-10-CM

## 2024-11-13 DIAGNOSIS — I71.40 ABDOMINAL AORTIC ANEURYSM (AAA) WITHOUT RUPTURE, UNSPECIFIED PART: ICD-10-CM

## 2024-11-13 DIAGNOSIS — Z98.890 S/P AAA REPAIR: ICD-10-CM

## 2024-11-13 DIAGNOSIS — Z86.79 S/P AAA REPAIR: ICD-10-CM

## 2024-11-13 DIAGNOSIS — Z95.0 PACEMAKER: ICD-10-CM

## 2024-11-13 DIAGNOSIS — E78.2 MIXED HYPERLIPIDEMIA: ICD-10-CM

## 2024-11-13 DIAGNOSIS — I35.0 AORTIC STENOSIS, SEVERE: ICD-10-CM

## 2024-11-13 DIAGNOSIS — I44.2 COMPLETE HEART BLOCK: ICD-10-CM

## 2024-11-13 DIAGNOSIS — I10 ESSENTIAL HYPERTENSION: ICD-10-CM

## 2024-11-13 DIAGNOSIS — Z87.438 HISTORY OF BPH: ICD-10-CM

## 2024-11-13 PROCEDURE — 93356 MYOCRD STRAIN IMG SPCKL TRCK: CPT | Performed by: INTERNAL MEDICINE

## 2024-11-13 PROCEDURE — 93306 TTE W/DOPPLER COMPLETE: CPT | Performed by: INTERNAL MEDICINE

## 2024-11-13 RX ORDER — ATORVASTATIN CALCIUM 10 MG/1
10 TABLET, FILM COATED ORAL NIGHTLY
Qty: 90 TABLET | Refills: 3 | Status: SHIPPED | OUTPATIENT
Start: 2024-11-13

## 2024-11-13 RX ORDER — METOPROLOL SUCCINATE 50 MG/1
50 TABLET, EXTENDED RELEASE ORAL DAILY
Qty: 90 TABLET | Refills: 3 | Status: SHIPPED | OUTPATIENT
Start: 2024-11-13

## 2025-01-03 ENCOUNTER — CLINICAL SUPPORT NO REQUIREMENTS (OUTPATIENT)
Dept: CARDIOLOGY | Facility: CLINIC | Age: 85
End: 2025-01-03
Payer: MEDICARE

## 2025-01-03 DIAGNOSIS — I44.2 HEART BLOCK AV COMPLETE: Primary | ICD-10-CM

## 2025-01-03 DIAGNOSIS — Z95.0 PACEMAKER: ICD-10-CM

## 2025-01-31 DIAGNOSIS — I50.22 CHRONIC HFREF (HEART FAILURE WITH REDUCED EJECTION FRACTION): ICD-10-CM

## 2025-01-31 DIAGNOSIS — Z95.2 S/P TAVR (TRANSCATHETER AORTIC VALVE REPLACEMENT): ICD-10-CM

## 2025-02-03 RX ORDER — METOPROLOL SUCCINATE 50 MG/1
50 TABLET, EXTENDED RELEASE ORAL DAILY
Qty: 90 TABLET | Refills: 3 | Status: SHIPPED | OUTPATIENT
Start: 2025-02-03

## 2025-02-03 NOTE — TELEPHONE ENCOUNTER
Rx Refill Note  Requested Prescriptions     Signed Prescriptions Disp Refills    metoprolol succinate XL (TOPROL-XL) 50 MG 24 hr tablet 90 tablet 3     Sig: TAKE 1 TABLET BY MOUTH DAILY     Authorizing Provider: ESTEVAN WEISS     Ordering User: RAHUL PÉREZ      Last office visit with prescribing clinician: 11/13/2024   Last telemedicine visit with prescribing clinician: Visit date not found   Next office visit with prescribing clinician: Visit date not found                         Would you like a call back once the refill request has been completed: [] Yes [] No    If the office needs to give you a call back, can they leave a voicemail: [] Yes [] No    Rahul Pérez MA  02/03/25, 09:29 EST

## 2025-02-10 NOTE — PROGRESS NOTES
Encounter Date:02/19/2025        Patient ID: Yordy Hebert is a 84 y.o. male.      Chief Complaint:      History of Present Illness  84-year-old with history of CAD, hypertension, hyperlipidemia, PVD, AAA status postrepair, severe aortic stenosis status post transcatheter transfemoral aortic valve replacement with a 34 mm Medtronic valve who presents for follow-up.  He also required a permanent pacemaker after TAVR.    ECG showed atrial fibrillation with ventricular pacing. Heart rate 62,  and QTc 462 ms    Overall he has done really well and denies any symptoms.  He denies any chest pain, shortness of breath and has been tolerating his medications well.  His main issue is lower back pain for which he is thinking of undergoing an ablation in April 2025    Current cardiac medications include aspirin, Eliquis, Lipitor and Toprol-XL    The following portions of the patient's history were reviewed and updated as appropriate: allergies, current medications, past family history, past medical history, past social history, past surgical history, and problem list.    Review of Systems   Constitutional: Negative for malaise/fatigue.   Cardiovascular:  Negative for chest pain, dyspnea on exertion, leg swelling and palpitations.   Respiratory:  Negative for cough and shortness of breath.    Gastrointestinal:  Negative for abdominal pain, nausea and vomiting.   Neurological:  Negative for dizziness, focal weakness, headaches, light-headedness and numbness.   All other systems reviewed and are negative.      Current Outpatient Medications:     acetaminophen (TYLENOL) 500 MG tablet, Take 2 tablets by mouth Every 12 (Twelve) Hours., Disp: , Rfl:     apixaban (ELIQUIS) 5 MG tablet tablet, Take 1 tablet by mouth 2 (Two) Times a Day., Disp: 180 tablet, Rfl: 3    aspirin 81 MG chewable tablet, Chew 1 tablet Daily., Disp: , Rfl:     atorvastatin (LIPITOR) 10 MG tablet, Take 1 tablet by mouth Every Night., Disp: 90 tablet, Rfl:  3    HYDROcodone-acetaminophen (NORCO)  MG per tablet, Take 1 tablet by mouth Every 8 (Eight) Hours As Needed., Disp: , Rfl:     metoprolol succinate XL (TOPROL-XL) 50 MG 24 hr tablet, TAKE 1 TABLET BY MOUTH DAILY, Disp: 90 tablet, Rfl: 3    Current outpatient and discharge medications have been reconciled for the patient.  Reviewed by: Todd Boogie MD       No Known Allergies    Family History   Family history unknown: Yes       Past Surgical History:   Procedure Laterality Date    ABDOMINAL AORTIC ANEURYSM REPAIR      AORTIC VALVE REPAIR/REPLACEMENT N/A 12/11/2023    Procedure: Transfemoral Transcatheter Aortic Valve Replacement;  Surgeon: Todd Boogie MD;  Location: [facility] HYBRID OR;  Service: Cardiovascular;  Laterality: N/A;  TAVR using 34mm Medtronic aortic valve.    AORTIC VALVE REPAIR/REPLACEMENT N/A 12/11/2023    Procedure: TRANSFEMORAL TRANSCATHETER AORTIC VALVE REPLACEMENT;  Surgeon: Hemal Sloan MD;  Location: [facility] HYBRID OR;  Service: Cardiothoracic;  Laterality: N/A;    BACK SURGERY  11/17/2016    CARDIAC CATHETERIZATION      CARDIAC CATHETERIZATION Right 11/16/2023    Procedure: Left Heart Cath with Coronary Angiography;  Surgeon: Todd Boogie MD;  Location: [facility] CATH INVASIVE LOCATION;  Service: Cardiovascular;  Laterality: Right;    CARDIAC ELECTROPHYSIOLOGY PROCEDURE N/A 12/11/2023    Procedure: Pacemaker DC new, Medtronic 3830;  Surgeon: George Montejo MD;  Location: [facility] CATH INVASIVE LOCATION;  Service: Cardiovascular;  Laterality: N/A;    CATARACT EXTRACTION, BILATERAL      CORONARY ANGIOPLASTY WITH STENT PLACEMENT      INGUINAL HERNIA REPAIR      PROSTATE SURGERY      RADIOFREQUENCY ABLATION  09/04/2024    RENAL ARTERY STENT Left 06/11/2008       Past Medical History:   Diagnosis Date    Coronary artery disease     DJD (degenerative joint disease)     (L) hip    Family history of abdominal aortic aneurysm (AAA) repair 1/31/2012    Foot pain, left 4/13/2017    History of  AAA (abdominal aortic aneurysm) repair     Hyperlipidemia     Hypertension     Inguinal hernia     right    Prostatitis        Family History   Family history unknown: Yes       Social History     Socioeconomic History    Marital status:    Tobacco Use    Smoking status: Former     Current packs/day: 0.00     Average packs/day: 0.3 packs/day for 64.9 years (16.2 ttl pk-yrs)     Types: Cigarettes     Start date:      Quit date: 2023     Years since quittin.1     Passive exposure: Current    Smokeless tobacco: Never   Vaping Use    Vaping status: Never Used   Substance and Sexual Activity    Alcohol use: No    Drug use: Never    Sexual activity: Not Currently               Objective:       Physical Exam    There were no vitals taken for this visit.  The patient is alert, oriented and in no distress.    Vital signs as noted above.    Head and neck revealed no carotid bruits or jugular venous distension.  No thyromegaly or lymphadenopathy is present.    Lungs clear.  No wheezing.  Breath sounds are normal bilaterally.    Heart normal first and second heart sounds.  No murmur..  No pericardial rub is present.  No gallop is present.    Abdomen soft and nontender.  No organomegaly is present.    Extremities revealed good peripheral pulses without any pedal edema.    Skin warm and dry.    Musculoskeletal system is grossly normal.    CNS grossly normal.          No diagnosis found.LAB RESULTS (LAST 7 DAYS)    CBC        BMP        CMP         BNP        TROPONIN        CoAg        Creatinine Clearance  CrCl cannot be calculated (Patient's most recent lab result is older than the maximum 30 days allowed.).    ABG        Radiology  No radiology results for the last day    EKG  Procedures    Stress test      Echocardiogram  Results for orders placed during the hospital encounter of 23    Adult Transthoracic Echo Limited with Contrast if Necessary Per Protocol POD #1    Interpretation Summary    Left  ventricular ejection fraction appears to be 51 - 55%.    There is a TAVR valve present.    34 mm Medtronic valve is well-seated.  There is none to trace perivalvular leak.  Mean gradient is 3 mmHg.      Cardiac catheterization  Results for orders placed during the hospital encounter of 12/11/23    Cardiac Catheterization/Vascular Study    Conclusion  Indication:  Severe symptomatic nonrheumatic aortic stenosis  Congestive heart failure, NYHA class III    Procedures performed  Ultrasound-guided vascular access  Common femoral angiography  Balloon aortic valvuloplasty  Supravalvular aortogram  Temporary pacemaker placement  Transcatheter aortic valve replacement with 34 mm Evolut pro plus via left femoral arterial access  Aorto abdominal aortogram with bilateral iliofemoral runoff  Perclose and Mynx deployment    Procedural Details  The procedure was performed under conscious sedation in the hybrid OR.    Under ultrasound guidance, a 6 F introducer was placed in the right femoral vein using modified Seldinger technique. Under ultrasound guidance, a 7 F introducer was placed in the left femoral artery using modified Seldinger technique. Under ultrasound guidance, a 7 F introducer was placed in the right femoral artery using modified Seldinger technique.    Two Perclose ProGlide devices were used to 'pre-close' the LFA access site. A 5F bipolar electrode temporary pacemaking wire was inserted via the right femoral venous sheath and positioned using fluoroscopy. Pacing threshold was tested. The temporary pacemaker wire was secured in place.    A 6F pigtail catheter was placed in the aortic root via the RFA sheath. The aortic valve was crossed retrograde with a 0.035 inch straight wire through a 6F AL1 catheter via the LFA 18Fr sheath. The AL1 catheter was advanced into the LV and a exchange length 0.035 inch lunderquist wire in the LV.  We then performed a balloon aortic valvuloplasty under rapid pacing at 180 bpm with a  20 x 45 mm true balloon.    The 18F sheath was removed and the Medtronic delivery sheath was inserted over the lunderquist wire.    The TAVR procedure was then performed using a 34 mm Evolut pro + device. It was advanced over the lunderquist wire to the aortic valve position. After confirming correct position of the valve by fluoroscopy, the valve was implanted with ventricular pacing and dynamic aortography.  After deployment we noted an insole in the valve frame causing significant constraint in the valve.  We then advanced a 24 x 45 mm true balloon and performed postdilatation balloon aortic valvuloplasty under rapid pacing at 180 bpm.  The patient at this time developed complete heart block and we performed backup pacing at 80 bpm.    After valve deployment the valve was well-seated and there was no significant paravalvular leak noted.  This was confirmed by fluoroscopic views as well as echocardiogram.  Final gradient across the valve was 2 mmHg.    At the end of the procedure, the Medtronic delivery system was removed over a guidewire. The Perclose devices were deployed to secure the arterial access on the TAVR side which was left femoral access.    Distal aortic, iliac and femoral angiography was performed with no evidence of dissection, aneurysm or vascular injury.    The 7F RFA sheath was removed and the arteriotomy was closed with a Mynx closure device. The 6F RFV sheath along with the temporary pacemaker wire were left in place in anticipation for permanent pacemaker placement later today.  The patient was transferred to [facility]  in stable condition.    Lee Boogie and Nathalia performed the TAVR procedure. Dr. Jalloh assisted.    Estimated blood loss  20 mL    Complications  Complete heart block requiring permanent pacemaker placement.    Recommendations  Permanent pacemaker placement  Dual antiplatelet therapy  Cardiac rehab  Repeat echocardiogram and ECG in morning.    Electronically signed by Todd Boogie  MD, 12/11/23, 9:15 AM EST.          Assessment and Plan       There are no diagnoses linked to this encounter.     Valvular heart disease  Severe aortic stenosis  Status post transcatheter transfemoral aortic valve replacement with 34 mm Medtronic valve  Cardiac rehab referral in [place]  Currently on Eliquis for atrial fibrillation  Echocardiogram shows well-seated and functioning valve there is mild aortic regurgitation.  EF has reduced 40% from normal most likely secondary to multiple PVCs.  Continue Toprol-XL  Unable to tolerate ACE inhibitor due to low blood pressure.  Repeat echocardiogram showed moderate to severe mitral and tricuspid valve regurgitation  I have discussed possibility of mitral and tricuspid clip however the patient is currently asymptomatic.  Pulmonary hypertension with RVSP 63 mmHg     Complete heart block/PVCs  Permanent pacemaker in place  ECG shows frequent PVCs and bigeminy pattern  Continue Toprol-XL     Paroxysmal atrial fibrillation  Detected on pacemaker  [id number] score is 5  Continue Eliquis: He is requesting for half the dose because medication is difficult to afford.  Continue Toprol-XL for rate control  Permanent pacemaker in place for complete heart block after TAVR     Coronary artery disease  Patient reports previous PCI with 2 stents in 2014  Cardiac catheterization prior to TAVR showed stable CAD in the LAD  Continue Eliquis, statin and metoprolol  Aspirin and Plavix have been stopped while the patient is on Eliquis  Currently chest pain-free     Hypertension  Well-controlled  Continue with metoprolol     Hyperlipidemia  On Lipitor  LDL 79, HDL 54, triglyceride 92 and total cholesterol 150  Complains of myalgias     Peripheral arterial disease  AAA status post repair  CT scan shows CT shows aortobiiliac bypass graft appears patent without significant  stenosis.  Left greater than right SFA and popliteal artery disease, likely  hemodynamically significant on the  left.  Moderate multifocal infrapopliteal disease with two-vessel runoff bilaterally  Bilateral ZARI normal  Focus on hypertension and heart rate control with beta-blocker  Continue statin and Eliquis     Low back pain  Follow-up with pain clinic  Currently on Norco  Plan for ablation in April 2025     BPH   Continue tamsulosin

## 2025-02-19 ENCOUNTER — OFFICE VISIT (OUTPATIENT)
Dept: CARDIOLOGY | Facility: CLINIC | Age: 85
End: 2025-02-19
Payer: MEDICARE

## 2025-02-19 VITALS
BODY MASS INDEX: 23.49 KG/M2 | HEART RATE: 83 BPM | OXYGEN SATURATION: 97 % | SYSTOLIC BLOOD PRESSURE: 135 MMHG | WEIGHT: 155 LBS | DIASTOLIC BLOOD PRESSURE: 57 MMHG | HEIGHT: 68 IN

## 2025-02-19 DIAGNOSIS — Z95.2 S/P TAVR (TRANSCATHETER AORTIC VALVE REPLACEMENT): Primary | ICD-10-CM

## 2025-02-19 DIAGNOSIS — I72.3 ANEURYSM OF ILIAC ARTERY: ICD-10-CM

## 2025-02-19 DIAGNOSIS — I44.2 COMPLETE HEART BLOCK: ICD-10-CM

## 2025-02-19 DIAGNOSIS — Z98.890 S/P AAA REPAIR: ICD-10-CM

## 2025-02-19 DIAGNOSIS — Z95.0 PACEMAKER: ICD-10-CM

## 2025-02-19 DIAGNOSIS — E78.2 MIXED HYPERLIPIDEMIA: ICD-10-CM

## 2025-02-19 DIAGNOSIS — Z86.79 S/P AAA REPAIR: ICD-10-CM

## 2025-02-19 DIAGNOSIS — I50.22 CHRONIC HFREF (HEART FAILURE WITH REDUCED EJECTION FRACTION): ICD-10-CM

## 2025-02-19 DIAGNOSIS — I73.9 PERIPHERAL VASCULAR DISEASE, UNSPECIFIED: ICD-10-CM

## 2025-02-19 DIAGNOSIS — I44.2 HEART BLOCK AV COMPLETE: ICD-10-CM

## 2025-02-19 DIAGNOSIS — I10 ESSENTIAL HYPERTENSION: ICD-10-CM

## 2025-02-19 DIAGNOSIS — I71.40 ABDOMINAL AORTIC ANEURYSM (AAA) WITHOUT RUPTURE, UNSPECIFIED PART: ICD-10-CM

## 2025-02-19 DIAGNOSIS — I48.91 NEW ONSET ATRIAL FIBRILLATION: ICD-10-CM

## 2025-02-19 DIAGNOSIS — Z87.438 HISTORY OF BPH: ICD-10-CM

## 2025-02-19 DIAGNOSIS — I35.0 AORTIC STENOSIS, SEVERE: ICD-10-CM

## 2025-02-19 DIAGNOSIS — I25.10 CHRONIC CORONARY ARTERY DISEASE: ICD-10-CM

## 2025-03-18 ENCOUNTER — OFFICE VISIT (OUTPATIENT)
Dept: FAMILY MEDICINE CLINIC | Facility: CLINIC | Age: 85
End: 2025-03-18
Payer: MEDICARE

## 2025-03-18 VITALS
SYSTOLIC BLOOD PRESSURE: 155 MMHG | DIASTOLIC BLOOD PRESSURE: 89 MMHG | TEMPERATURE: 97.6 F | WEIGHT: 152 LBS | OXYGEN SATURATION: 96 % | BODY MASS INDEX: 23.04 KG/M2 | HEART RATE: 83 BPM | RESPIRATION RATE: 18 BRPM | HEIGHT: 68 IN

## 2025-03-18 DIAGNOSIS — N13.8 BENIGN PROSTATIC HYPERPLASIA WITH URINARY OBSTRUCTION: ICD-10-CM

## 2025-03-18 DIAGNOSIS — I25.10 CHRONIC CORONARY ARTERY DISEASE: ICD-10-CM

## 2025-03-18 DIAGNOSIS — R73.01 IMPAIRED FASTING GLUCOSE: ICD-10-CM

## 2025-03-18 DIAGNOSIS — M54.41 CHRONIC MIDLINE LOW BACK PAIN WITH BILATERAL SCIATICA: ICD-10-CM

## 2025-03-18 DIAGNOSIS — I10 ESSENTIAL HYPERTENSION: Primary | ICD-10-CM

## 2025-03-18 DIAGNOSIS — I35.0 AORTIC STENOSIS, SEVERE: ICD-10-CM

## 2025-03-18 DIAGNOSIS — I44.2 HEART BLOCK AV COMPLETE: ICD-10-CM

## 2025-03-18 DIAGNOSIS — E78.2 MIXED HYPERLIPIDEMIA: ICD-10-CM

## 2025-03-18 DIAGNOSIS — M54.42 CHRONIC MIDLINE LOW BACK PAIN WITH BILATERAL SCIATICA: ICD-10-CM

## 2025-03-18 DIAGNOSIS — G89.29 CHRONIC MIDLINE LOW BACK PAIN WITH BILATERAL SCIATICA: ICD-10-CM

## 2025-03-18 DIAGNOSIS — Z95.0 PACEMAKER: ICD-10-CM

## 2025-03-18 DIAGNOSIS — N40.1 BENIGN PROSTATIC HYPERPLASIA WITH URINARY OBSTRUCTION: ICD-10-CM

## 2025-03-18 DIAGNOSIS — Z95.2 S/P TAVR (TRANSCATHETER AORTIC VALVE REPLACEMENT): ICD-10-CM

## 2025-03-18 PROCEDURE — 1160F RVW MEDS BY RX/DR IN RCRD: CPT | Performed by: FAMILY MEDICINE

## 2025-03-18 PROCEDURE — 1159F MED LIST DOCD IN RCRD: CPT | Performed by: FAMILY MEDICINE

## 2025-03-18 PROCEDURE — 99214 OFFICE O/P EST MOD 30 MIN: CPT | Performed by: FAMILY MEDICINE

## 2025-03-18 PROCEDURE — 3077F SYST BP >= 140 MM HG: CPT | Performed by: FAMILY MEDICINE

## 2025-03-18 PROCEDURE — 1126F AMNT PAIN NOTED NONE PRSNT: CPT | Performed by: FAMILY MEDICINE

## 2025-03-18 PROCEDURE — 3079F DIAST BP 80-89 MM HG: CPT | Performed by: FAMILY MEDICINE

## 2025-03-18 NOTE — PROGRESS NOTES
Subjective   Yordy Hebert is a 84 y.o. male.   Chief Complaint   Patient presents with    Hypertension       History of Present Illness   84 y.o. male with past medical history of chronic back pain followed by pain management, BPH, impaired fasting glucose, HTN, HLD, cardiac problems including aortic stenosis status post transcatheter aortic valve replacement, pacemaker for complete heart block, chronic CAD, abdominal aortic aneurysm status postrepair presents to the office to follow-up.  I last saw him 6 months ago.  Last lab work was 6 months ago.  Cholesterol levels were excellent.  PSA was 0.4.  Glucose was 104.  LFTs were normal.  He was not anemic.  Saw Dr. Boogie about a month ago.    History of Present Illness  The patient is an 84-year-old male who presents for evaluation of back pain, cardiac issues, and elevated blood pressure.    He reports persistent back pain, described as similar to a toothache, which significantly impairs his mobility, particularly during ambulation. He has undergone one ablation procedure, which unfortunately did not alleviate his symptoms. He was informed by the nursing staff that multiple ablations may be necessary. He is scheduled for another ablation on 04/10/2025. Despite receiving monthly injections, he finds no relief from his back pain. He spends the majority of his time indoors, confined to a recliner due to the severity of his discomfort. He also mentions that he relies on a cart for support while walking in [facility].    He reports no recent cardiac issues. His last consultation with his cardiologist was approximately 2 to 3 weeks ago, during which he was reassured of his stable condition. He has a pacemaker in place, which was implanted following a valve repair surgery. He is currently on Eliquis and metoprolol as part of his cardiac management.    His blood pressure was noted to be not elevated during his last visit with Dr. Boogie.     Supplemental Information  He has a  living will but has not included his preference regarding life-sustaining measures such as ventilator support.    MEDICATIONS  Eliquis, metoprolol      Patient Active Problem List    Diagnosis Date Noted    Pacemaker 12/19/2023    Hospital discharge follow-up 12/19/2023    S/P TAVR (transcatheter aortic valve replacement) 12/11/2023    Heart block AV complete 12/11/2023    Aortic stenosis, severe 11/01/2023    Hematuria 08/03/2023    Calculus of gallbladder with chronic cholecystitis without obstruction 12/11/2022     Note Last Updated: 12/11/2022     Noted on U/S - 12/9/22.  Asymptomatic       Chronic midline low back pain with bilateral sciatica 09/21/2022    Left shoulder pain 01/08/2020    Left rotator cuff tear arthropathy 01/08/2020    Abdominal aortic aneurysm 11/01/2017     Note Last Updated: 1/30/2020     Had AAA repaired in '08 with a sleeve - repeat CT in 9/19 - no evidence of any leaking.       Presence of cardiac and vascular implant and graft 11/01/2017    Peroneal tendinitis 04/13/2017    Lumbar radiculopathy 06/02/2015     Note Last Updated: 11/13/2019     Had series of injections in his back in 2016.  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    Impaired fasting glucose 03/03/2015    Essential hypertension 09/30/2014    Chronic coronary artery disease 09/23/2014     Note Last Updated: 11/13/2019     Had MI on 2014 - hed emergent cath - had 2 stents      S/P AAA repair 08/24/2014    Carotid bruit 09/24/2013    Hay fever 03/22/2013    Inguinal hernia, right 04/27/2012    Arteriosclerotic vascular disease 01/31/2012    Benign prostatic hyperplasia with urinary obstruction 01/31/2012    Degenerative joint disease 01/31/2012    Inflammatory disease of prostate 01/31/2012    Systolic murmur 01/31/2012    Mixed hyperlipidemia 12/20/2011           Past Surgical History:   Procedure Laterality Date    ABDOMINAL AORTIC ANEURYSM REPAIR      AORTIC VALVE  REPAIR/REPLACEMENT N/A 12/11/2023    Procedure: Transfemoral Transcatheter Aortic Valve Replacement;  Surgeon: Todd Boogie MD;  Location: [facility] HYBRID OR;  Service: Cardiovascular;  Laterality: N/A;  TAVR using 34mm Medtronic aortic valve.    AORTIC VALVE REPAIR/REPLACEMENT N/A 12/11/2023    Procedure: TRANSFEMORAL TRANSCATHETER AORTIC VALVE REPLACEMENT;  Surgeon: Hemal Sloan MD;  Location: [facility] HYBRID OR;  Service: Cardiothoracic;  Laterality: N/A;    BACK SURGERY  11/17/2016    CARDIAC CATHETERIZATION      CARDIAC CATHETERIZATION Right 11/16/2023    Procedure: Left Heart Cath with Coronary Angiography;  Surgeon: Todd Boogie MD;  Location: [facility] CATH INVASIVE LOCATION;  Service: Cardiovascular;  Laterality: Right;    CARDIAC ELECTROPHYSIOLOGY PROCEDURE N/A 12/11/2023    Procedure: Pacemaker DC new, Medtronic 3830;  Surgeon: George Montejo MD;  Location: [facility] CATH INVASIVE LOCATION;  Service: Cardiovascular;  Laterality: N/A;    CATARACT EXTRACTION, BILATERAL      CORONARY ANGIOPLASTY WITH STENT PLACEMENT      INGUINAL HERNIA REPAIR      PROSTATE SURGERY      RADIOFREQUENCY ABLATION  09/04/2024    RENAL ARTERY STENT Left 06/11/2008     Current Outpatient Medications on File Prior to Visit   Medication Sig    acetaminophen (TYLENOL) 500 MG tablet Take 2 tablets by mouth Every 12 (Twelve) Hours.    apixaban (ELIQUIS) 5 MG tablet tablet Take 1 tablet by mouth 2 (Two) Times a Day.    aspirin 81 MG chewable tablet Chew 1 tablet Daily.    atorvastatin (LIPITOR) 10 MG tablet Take 1 tablet by mouth Every Night.    HYDROcodone-acetaminophen (NORCO)  MG per tablet Take 1 tablet by mouth Every 8 (Eight) Hours As Needed.    metoprolol succinate XL (TOPROL-XL) 50 MG 24 hr tablet TAKE 1 TABLET BY MOUTH DAILY     No current facility-administered medications on file prior to visit.     No Known Allergies  Social History     Socioeconomic History    Marital status:    Tobacco Use    Smoking status:  "Former     Current packs/day: 0.00     Average packs/day: 0.3 packs/day for 64.9 years (16.2 ttl pk-yrs)     Types: Cigarettes     Start date:      Quit date: 2023     Years since quittin.2     Passive exposure: Current    Smokeless tobacco: Never   Vaping Use    Vaping status: Never Used   Substance and Sexual Activity    Alcohol use: No    Drug use: Never    Sexual activity: Not Currently     Family History   Family history unknown: Yes       Review of Systems    Objective   /89 (BP Location: Right arm, Patient Position: Sitting, Cuff Size: Adult)   Pulse 83   Temp 97.6 °F (36.4 °C) (Oral)   Resp 18   Ht 172.7 cm (68\")   Wt 68.9 kg (152 lb)   SpO2 96%   BMI 23.11 kg/m²   Physical Exam  Constitutional:       Appearance: He is well-developed. He is not toxic-appearing.      Comments: Elderly WM, NAD     HENT:      Head: Normocephalic and atraumatic.   Eyes:      Conjunctiva/sclera: Conjunctivae normal.   Cardiovascular:      Rate and Rhythm: Normal rate.      Heart sounds: No murmur (hardly any murmur now) heard.  Pulmonary:      Effort: Pulmonary effort is normal. No respiratory distress.      Breath sounds: Normal breath sounds.   Chest:      Comments: Left upper chest - hard sub-q mass c/w pacemaker  Musculoskeletal:         General: Normal range of motion.      Cervical back: Normal range of motion.      Right lower leg: No edema.      Left lower leg: No edema.   Skin:     General: Skin is warm and dry.      Findings: No rash.   Neurological:      Mental Status: He is alert and oriented to person, place, and time.   Psychiatric:         Behavior: Behavior normal.       Physical Exam  Heart rate is 83.      No visits with results within 4 Month(s) from this visit.   Latest known visit with results is:   Results Encounter on 10/04/2024   Component Date Value Ref Range Status    PSA 10/16/2024 0.4  0.0 - 4.0 ng/mL Final    Comment: Roche ECLIA methodology.  According to the American " Urological Association, Serum PSA should  decrease and remain at undetectable levels after radical  prostatectomy. The AUA defines biochemical recurrence as an initial  PSA value 0.2 ng/mL or greater followed by a subsequent confirmatory  PSA value 0.2 ng/mL or greater.  Values obtained with different assay methods or kits cannot be used  interchangeably. Results cannot be interpreted as absolute evidence  of the presence or absence of malignant disease.      Total Cholesterol 10/16/2024 146  100 - 199 mg/dL Final    Triglycerides 10/16/2024 90  0 - 149 mg/dL Final    HDL Cholesterol 10/16/2024 59  >39 mg/dL Final    VLDL Cholesterol Jv 10/16/2024 17  5 - 40 mg/dL Final    LDL Chol Calc (NIH) 10/16/2024 70  0 - 99 mg/dL Final    Glucose 10/16/2024 104 (H)  70 - 99 mg/dL Final    BUN 10/16/2024 21  8 - 27 mg/dL Final    Creatinine 10/16/2024 0.86  0.76 - 1.27 mg/dL Final    EGFR Result 10/16/2024 85  >59 mL/min/1.73 Final    BUN/Creatinine Ratio 10/16/2024 24  10 - 24 Final    Sodium 10/16/2024 138  134 - 144 mmol/L Final    Potassium 10/16/2024 4.7  3.5 - 5.2 mmol/L Final    Chloride 10/16/2024 100  96 - 106 mmol/L Final    Total CO2 10/16/2024 26  20 - 29 mmol/L Final    Calcium 10/16/2024 10.0  8.6 - 10.2 mg/dL Final    Total Protein 10/16/2024 7.0  6.0 - 8.5 g/dL Final    Albumin 10/16/2024 4.4  3.7 - 4.7 g/dL Final    Globulin 10/16/2024 2.6  1.5 - 4.5 g/dL Final    Total Bilirubin 10/16/2024 0.7  0.0 - 1.2 mg/dL Final    Alkaline Phosphatase 10/16/2024 97  44 - 121 IU/L Final    AST (SGOT) 10/16/2024 24  0 - 40 IU/L Final    ALT (SGPT) 10/16/2024 13  0 - 44 IU/L Final    WBC 10/16/2024 6.5  3.4 - 10.8 x10E3/uL Final    RBC 10/16/2024 4.59  4.14 - 5.80 x10E6/uL Final    Hemoglobin 10/16/2024 14.4  13.0 - 17.7 g/dL Final    Hematocrit 10/16/2024 45.1  37.5 - 51.0 % Final    MCV 10/16/2024 98 (H)  79 - 97 fL Final    MCH 10/16/2024 31.4  26.6 - 33.0 pg Final    MCHC 10/16/2024 31.9  31.5 - 35.7 g/dL Final    RDW  10/16/2024 12.7  11.6 - 15.4 % Final    Platelets 10/16/2024 131 (L)  150 - 450 x10E3/uL Final    Neutrophil Rel % 10/16/2024 65  Not Estab. % Final    Lymphocyte Rel % 10/16/2024 16  Not Estab. % Final    Monocyte Rel % 10/16/2024 12  Not Estab. % Final    Eosinophil Rel % 10/16/2024 6  Not Estab. % Final    Basophil Rel % 10/16/2024 1  Not Estab. % Final    Neutrophils Absolute 10/16/2024 4.2  1.4 - 7.0 x10E3/uL Final    Lymphocytes Absolute 10/16/2024 1.0  0.7 - 3.1 x10E3/uL Final    Monocytes Absolute 10/16/2024 0.8  0.1 - 0.9 x10E3/uL Final    Eosinophils Absolute 10/16/2024 0.4  0.0 - 0.4 x10E3/uL Final    Basophils Absolute 10/16/2024 0.0  0.0 - 0.2 x10E3/uL Final    Immature Granulocyte Rel % 10/16/2024 0  Not Estab. % Final    Immature Grans Absolute 10/16/2024 0.0  0.0 - 0.1 x10E3/uL Final     Results  Laboratory Studies  Blood sugar was 104. Cholesterol levels were good.    BMI is within normal parameters. No other follow-up for BMI required.     Assessment & Plan   Diagnoses and all orders for this visit:    1. Essential hypertension (Primary)    2. Mixed hyperlipidemia    3. Aortic stenosis, severe    4. S/P TAVR (transcatheter aortic valve replacement)    5. Heart block AV complete    6. Pacemaker    7. Chronic coronary artery disease    8. Impaired fasting glucose    9. Chronic midline low back pain with bilateral sciatica    10. Benign prostatic hyperplasia with urinary obstruction      Assessment & Plan  1. Back pain.  The patient reports persistent back pain that worsens with walking and is not alleviated by monthly injections. He has undergone one ablation, which was ineffective, and is scheduled for another ablation on 04/10/2025.    2. Cardiac issues.  The patient's heart rhythm is stable, and his pacemaker is functioning properly. He is currently on Eliquis and metoprolol.  Tolerating those without side effects.  No abnormal bleeding.  Continue those medicines at current doses.    3. Elevated  blood pressure.  The patient's blood pressure was slightly elevated during a recent visit but is generally well-controlled. His blood pressure was 135/57 during the last visit with Dr. Boogie and 130 at the last visit here. No changes to his current medication regimen are necessary.    Follow-up  The patient will follow up in the fall for a Medicare wellness check and lab work.    PROCEDURE  The patient has a pacemaker in place, which was implanted following a valve repair surgery. He has undergone one ablation procedure for back pain, which unfortunately did not alleviate his symptoms.        Call with any problems or concerns before next visit       Return in 6 months (on 9/18/2025), or for Medicare Wellness, for Medicare Wellness.  Patient or patient representative verbalized consent for the use of Ambient Listening during the visit with  Garima Jain MD for chart documentation. 3/18/2025  13:48 EDT    Part of this note may be an electronic transcription/translation of spoken language to printed text using the Dragon Dictation System    Garima Jain [id number] EDT  This note has been electronically signed

## 2025-06-16 ENCOUNTER — OFFICE VISIT (OUTPATIENT)
Dept: FAMILY MEDICINE CLINIC | Facility: CLINIC | Age: 85
End: 2025-06-16
Payer: MEDICARE

## 2025-06-16 VITALS
HEART RATE: 81 BPM | BODY MASS INDEX: 22.73 KG/M2 | HEIGHT: 68 IN | RESPIRATION RATE: 16 BRPM | WEIGHT: 150 LBS | SYSTOLIC BLOOD PRESSURE: 140 MMHG | OXYGEN SATURATION: 95 % | TEMPERATURE: 96.4 F | DIASTOLIC BLOOD PRESSURE: 70 MMHG

## 2025-06-16 DIAGNOSIS — J01.00 ACUTE NON-RECURRENT MAXILLARY SINUSITIS: ICD-10-CM

## 2025-06-16 DIAGNOSIS — T78.40XD ALLERGY, SUBSEQUENT ENCOUNTER: Primary | ICD-10-CM

## 2025-06-16 RX ORDER — AZITHROMYCIN 250 MG/1
TABLET, FILM COATED ORAL
Qty: 6 TABLET | Refills: 0 | Status: SHIPPED | OUTPATIENT
Start: 2025-06-16

## 2025-06-16 RX ORDER — CLOPIDOGREL BISULFATE 75 MG/1
75 TABLET ORAL DAILY
COMMUNITY

## 2025-06-16 RX ORDER — PRAVASTATIN SODIUM 80 MG/1
TABLET ORAL
COMMUNITY

## 2025-06-16 NOTE — PROGRESS NOTES
"    Yordy Hebert is a 84 y.o. male.     History of Present Illness  84-year-old white patient of Dr. Jain who comes in today with complaints of persistent cough.  Patient just quit smoking several months ago he does have COPD lungs have some fine rales but he not doing inhalers or nebulizers.  I did offer to order those but he declined.  On examination he has having allergic rhinitis symptoms and his nares are starting to turn a darker pink.  Patient wants a Z-Kristopher which I will be glad to give him I did order some allergy medication as well.    Vital signs are stable            Z-Kristopher  Claritin/Flonase/Benadryl at bedtime  If no improvement call office       The following portions of the patient's history were reviewed and updated as appropriate: allergies, current medications, past family history, past medical history, past social history, past surgical history, and problem list.    Vitals:    06/16/25 1538   BP: 140/70   BP Location: Right arm   Patient Position: Sitting   Cuff Size: Adult   Pulse: 81   Resp: 16   Temp: 96.4 °F (35.8 °C)   SpO2: 95%   Weight: 68 kg (150 lb)   Height: 172.7 cm (68\")       Past Medical History:   Diagnosis Date    Coronary artery disease     DJD (degenerative joint disease)     (L) hip    Family history of abdominal aortic aneurysm (AAA) repair 1/31/2012    Foot pain, left 4/13/2017    History of AAA (abdominal aortic aneurysm) repair     Hyperlipidemia     Hypertension     Inguinal hernia     right    Prostatitis      Past Surgical History:   Procedure Laterality Date    ABDOMINAL AORTIC ANEURYSM REPAIR      AORTIC VALVE REPAIR/REPLACEMENT N/A 12/11/2023    Procedure: Transfemoral Transcatheter Aortic Valve Replacement;  Surgeon: Todd Boogie MD;  Location: [facility];  Service: Cardiovascular;  Laterality: N/A;  TAVR using 34mm Medtronic aortic valve.    AORTIC VALVE REPAIR/REPLACEMENT N/A 12/11/2023    Procedure: TRANSFEMORAL TRANSCATHETER AORTIC VALVE REPLACEMENT;  " Surgeon: Hemal Sloan MD;  Location: [facility] HYBRID OR;  Service: Cardiothoracic;  Laterality: N/A;    BACK SURGERY  11/17/2016    CARDIAC CATHETERIZATION      CARDIAC CATHETERIZATION Right 11/16/2023    Procedure: Left Heart Cath with Coronary Angiography;  Surgeon: Todd Boogie MD;  Location: [facility] CATH INVASIVE LOCATION;  Service: Cardiovascular;  Laterality: Right;    CARDIAC ELECTROPHYSIOLOGY PROCEDURE N/A 12/11/2023    Procedure: Pacemaker DC new, Medtronic 3830;  Surgeon: George Montejo MD;  Location: [facility] CATH INVASIVE LOCATION;  Service: Cardiovascular;  Laterality: N/A;    CATARACT EXTRACTION, BILATERAL      CORONARY ANGIOPLASTY WITH STENT PLACEMENT      INGUINAL HERNIA REPAIR      PROSTATE SURGERY      RADIOFREQUENCY ABLATION  09/04/2024    RENAL ARTERY STENT Left 06/11/2008     Family History   Family history unknown: Yes     Immunization History   Administered Date(s) Administered    COVID-19 (MODERNA) 1st,2nd,3rd Dose Monovalent 02/04/2021, 03/04/2021    COVID-19 (MODERNA) Monovalent Original Booster 12/15/2021    FLUAD TRI 65YR+ 10/23/2019, 10/14/2024    Flu Vaccine Split Quad 10/29/2021    Fluad Quad 65+ 10/20/2020    Fluzone (or Fluarix & Flulaval for VFC) >6mos 10/29/2021    Fluzone High-Dose 65+YRS 11/24/2015, 09/28/2016, 10/17/2018    Fluzone High-Dose 65+yrs 10/05/2022, 10/23/2023    Pneumococcal Conjugate 13-Valent (PCV13) 01/08/2018       Results Encounter on 10/04/2024   Component Date Value Ref Range Status    PSA 10/16/2024 0.4  0.0 - 4.0 ng/mL Final    Comment: Roche ECLIA methodology.  According to the American Urological Association, Serum PSA should  decrease and remain at undetectable levels after radical  prostatectomy. The AUA defines biochemical recurrence as an initial  PSA value 0.2 ng/mL or greater followed by a subsequent confirmatory  PSA value 0.2 ng/mL or greater.  Values obtained with different assay methods or kits cannot be used  interchangeably. Results cannot  be interpreted as absolute evidence  of the presence or absence of malignant disease.      Total Cholesterol 10/16/2024 146  100 - 199 mg/dL Final    Triglycerides 10/16/2024 90  0 - 149 mg/dL Final    HDL Cholesterol 10/16/2024 59  >39 mg/dL Final    VLDL Cholesterol Jv 10/16/2024 17  5 - 40 mg/dL Final    LDL Chol Calc (NIH) 10/16/2024 70  0 - 99 mg/dL Final    Glucose 10/16/2024 104 (H)  70 - 99 mg/dL Final    BUN 10/16/2024 21  8 - 27 mg/dL Final    Creatinine 10/16/2024 0.86  0.76 - 1.27 mg/dL Final    EGFR Result 10/16/2024 85  >59 mL/min/1.73 Final    BUN/Creatinine Ratio 10/16/2024 24  10 - 24 Final    Sodium 10/16/2024 138  134 - 144 mmol/L Final    Potassium 10/16/2024 4.7  3.5 - 5.2 mmol/L Final    Chloride 10/16/2024 100  96 - 106 mmol/L Final    Total CO2 10/16/2024 26  20 - 29 mmol/L Final    Calcium 10/16/2024 10.0  8.6 - 10.2 mg/dL Final    Total Protein 10/16/2024 7.0  6.0 - 8.5 g/dL Final    Albumin 10/16/2024 4.4  3.7 - 4.7 g/dL Final    Globulin 10/16/2024 2.6  1.5 - 4.5 g/dL Final    Total Bilirubin 10/16/2024 0.7  0.0 - 1.2 mg/dL Final    Alkaline Phosphatase 10/16/2024 97  44 - 121 IU/L Final    AST (SGOT) 10/16/2024 24  0 - 40 IU/L Final    ALT (SGPT) 10/16/2024 13  0 - 44 IU/L Final    WBC 10/16/2024 6.5  3.4 - 10.8 x10E3/uL Final    RBC 10/16/2024 4.59  4.14 - 5.80 x10E6/uL Final    Hemoglobin 10/16/2024 14.4  13.0 - 17.7 g/dL Final    Hematocrit 10/16/2024 45.1  37.5 - 51.0 % Final    MCV 10/16/2024 98 (H)  79 - 97 fL Final    MCH 10/16/2024 31.4  26.6 - 33.0 pg Final    MCHC 10/16/2024 31.9  31.5 - 35.7 g/dL Final    RDW 10/16/2024 12.7  11.6 - 15.4 % Final    Platelets 10/16/2024 131 (L)  150 - 450 x10E3/uL Final    Neutrophil Rel % 10/16/2024 65  Not Estab. % Final    Lymphocyte Rel % 10/16/2024 16  Not Estab. % Final    Monocyte Rel % 10/16/2024 12  Not Estab. % Final    Eosinophil Rel % 10/16/2024 6  Not Estab. % Final    Basophil Rel % 10/16/2024 1  Not Estab. % Final    Neutrophils  Absolute 10/16/2024 4.2  1.4 - 7.0 x10E3/uL Final    Lymphocytes Absolute 10/16/2024 1.0  0.7 - 3.1 x10E3/uL Final    Monocytes Absolute 10/16/2024 0.8  0.1 - 0.9 x10E3/uL Final    Eosinophils Absolute 10/16/2024 0.4  0.0 - 0.4 x10E3/uL Final    Basophils Absolute 10/16/2024 0.0  0.0 - 0.2 x10E3/uL Final    Immature Granulocyte Rel % 10/16/2024 0  Not Estab. % Final    Immature Grans Absolute 10/16/2024 0.0  0.0 - 0.1 x10E3/uL Final         Review of Systems   Constitutional: Negative.    HENT:  Positive for congestion.    Respiratory:  Positive for cough.    Cardiovascular: Negative.    Gastrointestinal: Negative.    Genitourinary: Negative.    Musculoskeletal: Negative.    Skin: Negative.    Neurological: Negative.    Psychiatric/Behavioral: Negative.         Objective   Physical Exam  Constitutional:       Appearance: Normal appearance.   HENT:      Head: Normocephalic.      Nose:      Comments: Terminates medium pink     Mouth/Throat:      Comments: Heavy postnasal drip  Cardiovascular:      Rate and Rhythm: Normal rate and regular rhythm.      Pulses: Normal pulses.      Heart sounds: Normal heart sounds.   Pulmonary:      Effort: Pulmonary effort is normal.      Comments: Scattered rales  Abdominal:      General: Bowel sounds are normal.   Musculoskeletal:         General: Normal range of motion.   Skin:     General: Skin is warm.   Neurological:      General: No focal deficit present.      Mental Status: He is alert and oriented to person, place, and time.   Psychiatric:         Mood and Affect: Mood normal.         Behavior: Behavior normal.         Procedures    Assessment & Plan   Diagnoses and all orders for this visit:    1. Allergy, subsequent encounter (Primary)    2. Acute non-recurrent maxillary sinusitis    Other orders  -     azithromycin (Zithromax Z-Kristopher) 250 MG tablet; Take 2 tablets the first day, then 1 tablet daily for 4 days.  Dispense: 6 tablet; Refill: 0           Current Outpatient  Medications:     acetaminophen (TYLENOL) 500 MG tablet, Take 2 tablets by mouth Every 12 (Twelve) Hours., Disp: , Rfl:     apixaban (ELIQUIS) 5 MG tablet tablet, Take 1 tablet by mouth 2 (Two) Times a Day., Disp: 180 tablet, Rfl: 3    aspirin 81 MG chewable tablet, Chew 1 tablet Daily., Disp: , Rfl:     azithromycin (Zithromax Z-Kristopher) 250 MG tablet, Take 2 tablets the first day, then 1 tablet daily for 4 days., Disp: 6 tablet, Rfl: 0    clopidogrel (PLAVIX) 75 MG tablet, Take 1 tablet by mouth Daily., Disp: , Rfl:     HYDROcodone-acetaminophen (NORCO)  MG per tablet, Take 1 tablet by mouth Every 8 (Eight) Hours As Needed., Disp: , Rfl:     metoprolol succinate XL (TOPROL-XL) 50 MG 24 hr tablet, TAKE 1 TABLET BY MOUTH DAILY, Disp: 90 tablet, Rfl: 3    pravastatin (PRAVACHOL) 80 MG tablet, , Disp: , Rfl:            Jennifer Garner, AMBER 6/23/2025 15:03 EDT  This note has been electronically signed

## 2025-06-30 LAB
MC_CV_MDC_IDC_RATE_1: 150
MC_CV_MDC_IDC_RATE_1: 171
MC_CV_MDC_IDC_THERAPIES: NORMAL
MC_CV_MDC_IDC_ZONE_ID: 2
MC_CV_MDC_IDC_ZONE_ID: 6
MDC_IDC_MSMT_BATTERY_REMAINING_LONGEVITY: 146 MO
MDC_IDC_MSMT_BATTERY_RRT_TRIGGER: 2.62
MDC_IDC_MSMT_BATTERY_STATUS: NORMAL
MDC_IDC_MSMT_BATTERY_VOLTAGE: 3.01
MDC_IDC_MSMT_LEADCHNL_RA_DTM: NORMAL
MDC_IDC_MSMT_LEADCHNL_RA_IMPEDANCE_VALUE: 418
MDC_IDC_MSMT_LEADCHNL_RA_PACING_THRESHOLD_AMPLITUDE: 0.5
MDC_IDC_MSMT_LEADCHNL_RA_PACING_THRESHOLD_POLARITY: NORMAL
MDC_IDC_MSMT_LEADCHNL_RA_PACING_THRESHOLD_PULSEWIDTH: 0.4
MDC_IDC_MSMT_LEADCHNL_RA_SENSING_INTR_AMPL: 1.38
MDC_IDC_MSMT_LEADCHNL_RV_DTM: NORMAL
MDC_IDC_MSMT_LEADCHNL_RV_IMPEDANCE_VALUE: 684
MDC_IDC_MSMT_LEADCHNL_RV_PACING_THRESHOLD_AMPLITUDE: 1
MDC_IDC_MSMT_LEADCHNL_RV_PACING_THRESHOLD_POLARITY: NORMAL
MDC_IDC_MSMT_LEADCHNL_RV_PACING_THRESHOLD_PULSEWIDTH: 0.4
MDC_IDC_MSMT_LEADCHNL_RV_SENSING_INTR_AMPL: 17.25
MDC_IDC_PG_IMPLANT_DTM: NORMAL
MDC_IDC_PG_MFG: NORMAL
MDC_IDC_PG_MODEL: NORMAL
MDC_IDC_PG_SERIAL: NORMAL
MDC_IDC_PG_TYPE: NORMAL
MDC_IDC_SESS_DTM: NORMAL
MDC_IDC_SESS_TYPE: NORMAL
MDC_IDC_SET_BRADY_LOWRATE: 60
MDC_IDC_SET_BRADY_MAX_SENSOR_RATE: 130
MDC_IDC_SET_BRADY_MODE: NORMAL
MDC_IDC_SET_LEADCHNL_RA_SENSING_POLARITY: NORMAL
MDC_IDC_SET_LEADCHNL_RA_SENSING_SENSITIVITY: 0.3
MDC_IDC_SET_LEADCHNL_RV_PACING_AMPLITUDE: 2
MDC_IDC_SET_LEADCHNL_RV_PACING_POLARITY: NORMAL
MDC_IDC_SET_LEADCHNL_RV_PACING_PULSEWIDTH: 0.4
MDC_IDC_SET_LEADCHNL_RV_SENSING_POLARITY: NORMAL
MDC_IDC_SET_LEADCHNL_RV_SENSING_SENSITIVITY: 0.9
MDC_IDC_SET_ZONE_STATUS: NORMAL
MDC_IDC_SET_ZONE_STATUS: NORMAL
MDC_IDC_SET_ZONE_TYPE: NORMAL
MDC_IDC_SET_ZONE_TYPE: NORMAL
MDC_IDC_STAT_AT_BURDEN_PERCENT: 100
MDC_IDC_STAT_BRADY_RA_PERCENT_PACED: 0
MDC_IDC_STAT_BRADY_RV_PERCENT_PACED: 94.94

## 2025-07-01 RX ORDER — APIXABAN 5 MG/1
TABLET, FILM COATED ORAL
Qty: 180 TABLET | Refills: 3 | Status: SHIPPED | OUTPATIENT
Start: 2025-07-01

## 2025-07-01 NOTE — TELEPHONE ENCOUNTER
Rx Refill Note  Requested Prescriptions     Pending Prescriptions Disp Refills    apixaban (Eliquis) 5 MG tablet tablet [Pharmacy Med Name: ELIQUIS 5MG TABLETS] 180 tablet 3     Sig: TAKE 1 TABLET BY MOUTH TWICE DAILY FOR ATRIAL FIBRILLATION      Last office visit with prescribing clinician: 2/19/2025 -     Next office visit with prescribing clinician: UNABLE TO SEE FUTURE SCHEDULED APPOINTMENTS FOR [facility] PATIENTS.                              Colleen Navarro MA  07/01/25, 13:10 EDT

## 2025-07-25 ENCOUNTER — TELEPHONE (OUTPATIENT)
Dept: CARDIOLOGY | Facility: CLINIC | Age: 85
End: 2025-07-25
Payer: MEDICARE

## 2025-07-25 NOTE — TELEPHONE ENCOUNTER
[place] OFFICE CLOSED ON FRIDAYS.     [place] OFFICE HANDLES RESCHEDULING OF DEVICE CHECKS.       CALLED PATIENT. VOICEMAIL NOT SET UP.

## 2025-07-25 NOTE — TELEPHONE ENCOUNTER
Addended by: FOZIA JONES on: 8/26/2019 09:44 PM     Modules accepted: Orders     Caller: Yordy Hebert    Relationship to patient: Self    Best call back number: [phone]    Type of visit: DEVICE CHECK    Requested date: TBD     If rescheduling, when is the original appointment: 08.01.25     Additional notes:PATIENT HAS PROCEDURE AND NEEDS TO RESCHEDULE DEVICE CHECK AT [place]. HUB ATTEMPTED WT NO ANSWER. PLEASE CALL AND ADVISE.

## 2025-08-20 ENCOUNTER — OFFICE VISIT (OUTPATIENT)
Dept: CARDIOLOGY | Facility: CLINIC | Age: 85
End: 2025-08-20
Payer: MEDICARE

## 2025-08-20 VITALS
SYSTOLIC BLOOD PRESSURE: 112 MMHG | DIASTOLIC BLOOD PRESSURE: 44 MMHG | HEART RATE: 78 BPM | HEIGHT: 68 IN | WEIGHT: 148 LBS | BODY MASS INDEX: 22.43 KG/M2 | OXYGEN SATURATION: 93 %

## 2025-08-20 DIAGNOSIS — E78.2 MIXED HYPERLIPIDEMIA: ICD-10-CM

## 2025-08-20 DIAGNOSIS — I50.22 CHRONIC HFREF (HEART FAILURE WITH REDUCED EJECTION FRACTION): ICD-10-CM

## 2025-08-20 DIAGNOSIS — I71.40 ABDOMINAL AORTIC ANEURYSM (AAA) WITHOUT RUPTURE, UNSPECIFIED PART: ICD-10-CM

## 2025-08-20 DIAGNOSIS — I44.2 HEART BLOCK AV COMPLETE: ICD-10-CM

## 2025-08-20 DIAGNOSIS — I48.91 NEW ONSET ATRIAL FIBRILLATION: ICD-10-CM

## 2025-08-20 DIAGNOSIS — I44.2 COMPLETE HEART BLOCK: ICD-10-CM

## 2025-08-20 DIAGNOSIS — I72.3 ANEURYSM OF ILIAC ARTERY: ICD-10-CM

## 2025-08-20 DIAGNOSIS — Z98.890 S/P AAA REPAIR: ICD-10-CM

## 2025-08-20 DIAGNOSIS — Z86.79 S/P AAA REPAIR: ICD-10-CM

## 2025-08-20 DIAGNOSIS — Z95.0 PACEMAKER: ICD-10-CM

## 2025-08-20 DIAGNOSIS — I25.10 CHRONIC CORONARY ARTERY DISEASE: ICD-10-CM

## 2025-08-20 DIAGNOSIS — I35.0 AORTIC STENOSIS, SEVERE: ICD-10-CM

## 2025-08-20 DIAGNOSIS — I73.9 PERIPHERAL VASCULAR DISEASE, UNSPECIFIED: ICD-10-CM

## 2025-08-20 DIAGNOSIS — Z95.2 S/P TAVR (TRANSCATHETER AORTIC VALVE REPLACEMENT): Primary | ICD-10-CM

## 2025-08-20 DIAGNOSIS — I10 ESSENTIAL HYPERTENSION: ICD-10-CM

## 2025-08-20 DIAGNOSIS — Z87.438 HISTORY OF BPH: ICD-10-CM

## 2025-08-20 RX ORDER — ATORVASTATIN CALCIUM 10 MG/1
10 TABLET, FILM COATED ORAL NIGHTLY
COMMUNITY
Start: 2025-08-11

## (undated) DEVICE — TAVR: Brand: MEDLINE INDUSTRIES, INC.

## (undated) DEVICE — STERILE CURV CRILE FORCEP (CF81810): Brand: CENTURION

## (undated) DEVICE — SUP ARMBRD HANDAID ART/LINE 9IN

## (undated) DEVICE — PROVE COVER: Brand: UNBRANDED

## (undated) DEVICE — 3M™ PATIENT PLATE, CORDED, SPLIT, LARGE, 40 PER CASE, 1179: Brand: 3M™

## (undated) DEVICE — VIOLET BRAIDED (POLYGLACTIN 910), SYNTHETIC ABSORBABLE SUTURE: Brand: COATED VICRYL

## (undated) DEVICE — KT CATH CV ACC MAC 2L SFTY 9F 4 1/2IN

## (undated) DEVICE — TR BAND RADIAL ARTERY COMPRESSION DEVICE: Brand: TR BAND

## (undated) DEVICE — 3M™ IOBAN™ 2 ANTIMICROBIAL INCISE DRAPE 6650EZ: Brand: IOBAN™ 2

## (undated) DEVICE — RADIFOCUS GLIDEWIRE: Brand: GLIDEWIRE

## (undated) DEVICE — SNAP KAP: Brand: UNBRANDED

## (undated) DEVICE — TRUE™ DILATATION BALLOON VALVULOPLASTY CATHETER, 24 MM X 4.5 CM,110 CM CATHETER: Brand: TRUE DILATATION

## (undated) DEVICE — IMMOB SHLDR CUT/AWAY UNIV

## (undated) DEVICE — GW HYDRO AQUATRACK PFTE FC .035IN 260CM

## (undated) DEVICE — DESTINATION RENAL GUIDING SHEATH: Brand: DESTINATION

## (undated) DEVICE — CVR PROB ULTRASND CIVFLEX GEN/PURP TELESCP/FOLD 5.5X96IN LF

## (undated) DEVICE — GW PTFE EMERALD HEPCOAT FC J TIP STD .035 3MM 150CM

## (undated) DEVICE — ST ACC MICROPUNCTURE STFF/CANN PLAT/TP 4F 21G 40CM

## (undated) DEVICE — PINNACLE INTRODUCER SHEATH: Brand: PINNACLE

## (undated) DEVICE — CATH GUIDE RIGHTSITE C315HIS-02

## (undated) DEVICE — CATH DIAG IMPULSE AL1 6F 100CM

## (undated) DEVICE — CABL BIPOL W/ALLGTR CLIP/SM 12FT

## (undated) DEVICE — INTRO SHEATH PRELUDE SNAP .038 7F 13CM W/SDPRT

## (undated) DEVICE — TBG PENCL TELESCP MEGADYNE SMOKE EVAC 10FT

## (undated) DEVICE — KT MANIFLD NAMIC HEART/LT INTERGRATED/COMPENSATOR W/SYR/10ML

## (undated) DEVICE — ANGIO-SEAL VIP VASCULAR CLOSURE DEVICE: Brand: ANGIO-SEAL

## (undated) DEVICE — MYNXGRIP 6F/7F: Brand: MYNXGRIP

## (undated) DEVICE — BIOPATCH™ ANTIMICROBIAL DRESSING WITH CHLORHEXIDINE GLUCONATE IS A HYDROPHILLIC POLYURETHANE ABSORPTIVE FOAM WITH CHLORHEXIDINE GLUCONATE (CHG) WHICH INHIBITS BACTERIAL GROWTH UNDER THE DRESSING. THE DRESSING IS INTENDED TO BE USED TO ABSORB EXUDATE, COVER A WOUND CAUSED BY VASCULAR AND NONVASCULAR PERCUTANEOUS MEDICAL DEVICES DURING SURGERY, AS WELL AS REDUCE LOCAL INFECTION AND COLONIZATION OF MICROORGANISMS.: Brand: BIOPATCH

## (undated) DEVICE — SUT SILK 0 CT1 CR8 18IN C021D

## (undated) DEVICE — PK TRY HEART CATH 50

## (undated) DEVICE — PACEMAKER CDS: Brand: MEDLINE INDUSTRIES, INC.

## (undated) DEVICE — CATH DIAG IMPULSE PIG .056 6F 110CM

## (undated) DEVICE — SWAN-GANZ BIPOLAR PACING CATHETER: Brand: SWAN-GANZ

## (undated) DEVICE — PK PERFUS CUST W/CARDIOPLEGIA

## (undated) DEVICE — SUT SILK 2/0 SH CR8 18IN CR8 C012D

## (undated) DEVICE — SWAN-GANZ THERMODILUTION CATHETER: Brand: SWAN-GANZ

## (undated) DEVICE — SHEET,DRAPE,53X77,STERILE: Brand: MEDLINE

## (undated) DEVICE — TBG NAMIC PRESS MONTR A/ F/M 12IN

## (undated) DEVICE — CATH DIAG IMPULSE FR4 6F 100CM

## (undated) DEVICE — Device

## (undated) DEVICE — GLIDESHEATH SLENDER ACCESS KIT: Brand: GLIDESHEATH SLENDER

## (undated) DEVICE — GW EMR FIX EXCHG J STD .035 3MM 260CM

## (undated) DEVICE — UNDYED BRAIDED (POLYGLACTIN 910), SYNTHETIC ABSORBABLE SUTURE: Brand: COATED VICRYL

## (undated) DEVICE — ADHS LIQ MASTISOL 2/3ML

## (undated) DEVICE — CATH TDILUT SWANGANZ VIP 7.5F 110CM

## (undated) DEVICE — STPCK 3WY HP ROT

## (undated) DEVICE — DRP INCISE CARDIO W/ADHS 115X85X151IN LG STRL

## (undated) DEVICE — LN INJ CONTRST FLXCIL HP F/M LL 1200PSI72

## (undated) DEVICE — DELIV SYS D-EVOLUTFX-34: Brand: EVOLUT™ FX

## (undated) DEVICE — C-ARM: Brand: UNBRANDED

## (undated) DEVICE — GW XCHG AMPLTZ XSTIF PTFE CRV .035 3X260

## (undated) DEVICE — SLITTER CATH GUIDE ATTAIN ADJ

## (undated) DEVICE — TUBING, SUCTION, 1/4" X 12', STRAIGHT: Brand: MEDLINE

## (undated) DEVICE — 500ML,PRESSURE INFUSER W/STOPCOCK: Brand: MEDLINE

## (undated) DEVICE — TRUE™ DILATATION BALLOON VALVULOPLASTY CATHETER, 22 MM X 4.5 CM,110 CM CATHETER: Brand: TRUE DILATATION

## (undated) DEVICE — HEMOCONCENTRATOR PERFUS LPS06

## (undated) DEVICE — 3M™ TEGADERM™ IV TRANSPARENT FILM DRESSING WITH BORDER 100 BAGS/CARTON 4 CARTONS/CASE 1633: Brand: 3M™ TEGADERM™

## (undated) DEVICE — LOADING SYS L-EVOLUTFX-34: Brand: EVOLUT™ FX

## (undated) DEVICE — ELECTRD DEFIB M/FUNC PROPADZ RADIOL 2PK

## (undated) DEVICE — GW WWIJ35260 WHOLEY WIRE V04: Brand: WHOLEY™

## (undated) DEVICE — 3M(TM) TEGADERM(TM) IV ADAVANCED SECUREMENT DRESSING 1685: Brand: 3M™ TEGADERM™

## (undated) DEVICE — SNAP KOVER: Brand: UNBRANDED

## (undated) DEVICE — SENSR CERBRL O2 PK/2

## (undated) DEVICE — INTRO PERFORMER CHECKFLO/LG RAD/BND NO/GW 18F .038IN 30CM

## (undated) DEVICE — CATH DIAG IMPULSE FL3.5 6F 100CM

## (undated) DEVICE — INTRO SHEATH PRELUDE SNAP .038 8.5F 13CM W/SDPRT LT/BLU

## (undated) DEVICE — TOWEL,OR,DSP,ST,BLUE,STD,4/PK,20PK/CS: Brand: MEDLINE

## (undated) DEVICE — PRESSURE MONITORING SET: Brand: TRUWAVE, VAMP PLUS

## (undated) DEVICE — HDLR SHARPS FM TEMP ADHS STRL

## (undated) DEVICE — CATH ART RADL 20GA 1 3/4IN LF

## (undated) DEVICE — ST IV BLD W/HANDPUMP YSITE 125IN

## (undated) DEVICE — PERCLOSE™ PROSTYLE™ SUTURE-MEDIATED CLOSURE AND REPAIR SYSTEM: Brand: PERCLOSE™ PROSTYLE™